# Patient Record
Sex: FEMALE | Race: WHITE | NOT HISPANIC OR LATINO | Employment: OTHER | URBAN - METROPOLITAN AREA
[De-identification: names, ages, dates, MRNs, and addresses within clinical notes are randomized per-mention and may not be internally consistent; named-entity substitution may affect disease eponyms.]

---

## 2017-01-04 ENCOUNTER — OFFICE VISIT (OUTPATIENT)
Dept: INTERNAL MEDICINE | Facility: CLINIC | Age: 82
End: 2017-01-04
Payer: MEDICARE

## 2017-01-04 ENCOUNTER — PATIENT OUTREACH (OUTPATIENT)
Dept: ADMINISTRATIVE | Facility: CLINIC | Age: 82
End: 2017-01-04
Payer: MEDICARE

## 2017-01-04 VITALS
HEIGHT: 65 IN | HEART RATE: 77 BPM | BODY MASS INDEX: 32.32 KG/M2 | DIASTOLIC BLOOD PRESSURE: 82 MMHG | SYSTOLIC BLOOD PRESSURE: 136 MMHG | OXYGEN SATURATION: 93 % | RESPIRATION RATE: 16 BRPM | WEIGHT: 194 LBS

## 2017-01-04 DIAGNOSIS — I10 ESSENTIAL HYPERTENSION: ICD-10-CM

## 2017-01-04 DIAGNOSIS — E16.2 HYPOGLYCEMIA: Primary | ICD-10-CM

## 2017-01-04 PROCEDURE — 99999 PR PBB SHADOW E&M-EST. PATIENT-LVL III: CPT | Mod: PBBFAC,,, | Performed by: INTERNAL MEDICINE

## 2017-01-04 PROCEDURE — 1160F RVW MEDS BY RX/DR IN RCRD: CPT | Mod: S$GLB,,, | Performed by: INTERNAL MEDICINE

## 2017-01-04 PROCEDURE — 1159F MED LIST DOCD IN RCRD: CPT | Mod: S$GLB,,, | Performed by: INTERNAL MEDICINE

## 2017-01-04 PROCEDURE — 1157F ADVNC CARE PLAN IN RCRD: CPT | Mod: S$GLB,,, | Performed by: INTERNAL MEDICINE

## 2017-01-04 PROCEDURE — 99214 OFFICE O/P EST MOD 30 MIN: CPT | Mod: S$GLB,,, | Performed by: INTERNAL MEDICINE

## 2017-01-04 NOTE — PATIENT INSTRUCTIONS
Diabetes (General Information)  Diabetes is a long-term health problem. It means your body does not make enough insulin. Or it may mean that your body cannot use the insulin it makes. Insulin is a hormone in your body. It lets blood sugar (glucose) reach the cells in your body. All of your cells need glucose for fuel.  When you have diabetes, the glucose in your blood builds up because it cannot get into the cells. This buildup is called high blood sugar (hyperglycemia).  Your blood sugar level depends on several things. It depends on what kind of food you eat and how much of it you eat. It also depends on how much exercise you get, and how much insulin you have in your body. Eating too much of the wrong kinds of food or not taking diabetes medicine on time can cause high blood sugar. Infections can cause high blood sugar even if you are taking medicines correctly.  These things can also cause low blood sugar:  · Missing meals  · Not eating enough food  · Taking too much diabetes medicine  Diabetes can cause serious problems over time if you do not get treated. These problems include heart disease, stroke, kidney failure, and blindness. They also include nerve pain or loss of feeling in your legs and feet, and gangrene of the feet. By keeping your blood sugar under control you can prevent or delay these problems.  Normal blood sugar levels are 80 to 100 before a meal and less than 180 in the 1 to 2 hours after a meal.  Home care  Follow these guidelines when caring for yourself at home:  · Follow the diet your healthcare provider gives you. Take insulin or other diabetes medicine exactly as told to.  · Watch your blood sugar as you are told to. Keep a log of your results. This will help your provider change your medicines to keep your blood sugar under control.  · Try to reach your ideal weight. You may be able to cut back on or not have to take diabetes medicine if you eat the right foods and get exercise.  · Do  not smoke. Smoking worsens the effects of diabetes on your circulation. You are much more likely to have a heart attack if you have diabetes and you smoke.  · Take good care of your feet. If you have lost feeling in your feet, you may not see an injury or infection. Check your feet and between your toes at least once a week.  · Wear a medical alert bracelet or necklace, or carry a card in your wallet that says you have diabetes. This will help healthcare providers give you the right care if you get very ill and cannot tell them that you have diabetes.  Sick day plan  If you get a cold, the flu, or a bacterial or viral infection, take these steps:  · Look at your diabetes sick plan and call your healthcare provider as you were told to. You may need to call your provider right away if:  ¨ Your blood sugar is above 240 while taking your diabetes medicine  ¨ Your urine ketone levels are above normal or high  ¨ You have been vomiting more than 6 hours  ¨ You have trouble breathing or your breath ha s a fruity smell  ¨ You have a high fever  ¨ You have a fever for several days and you are not getting better  ¨ You get light-headed and are sleepier than usual  · Keep taking your diabetes pills (oral medicine) even if you have been vomiting and are feeling sick. Call your provider right away because you may need insulin to lower your blood sugar until you recover from your illness.  · Keep taking your insulin even if you have been vomiting and are feeling sick. Call your provider right away to ask if you need to change your insulin dose. This will depend on your blood sugar results.  · Check your blood sugar every 2 to 4 hours, or at least 4 times a day.  · Check your ketones often. If you are vomiting and having diarrhea, watch them more often.  · Do not skip meals. Try to eat small meals on a regular schedule. Do this even if you do not feel like eating.  · Drink water or other liquids that do not have caffeine or  calories. This will keep you from getting dehydrated. If you are nauseated or vomiting, takes small sips every 5 minutes. To prevent dehydration try to drink a cup (8 ounces) of fluids every hour while you are awake.  General care  Always bring a source of fast-acting sugar with you in case you have symptoms of low blood sugar (below 70). At the first sign of low blood sugar, eat or drink 15 to 20 grams of fast-acting sugar to raise your blood sugar. Examples are:  · 3 to 4 glucose tablets. You can buy these at most [facility].  · 4 ounces (1/2 cup) of regular (not diet) soft drinks  · 4 ounces (1/2 cup) of any fruit juice  · 8 ounces (1 cup) of milk  · 5 to 6 pieces of hard candy  · 1 tablespoon of honey  Check your blood sugar 15 minutes after treating yourself. If it is still below 70, take 15 to 20 more grams of fast-acting sugar. Test again in 15 minutes. If it returns to normal (70 or above), eat a snack or meal to keep your blood sugar in a safe range. If it stays low, call your doctor or go to an emergency room.  Follow-up care  Follow-up with your healthcare provider, or as advised. For more information about diabetes, visit the American Diabetes Association website at www.diabetes.org or call 259-356-7962.  When to seek medical advice  Call your healthcare provider right away if you have any of these symptoms of high blood sugar:  · Frequent urination  · Dizziness  · Drowsiness  · Thirst  · Headache  · Nausea or vomiting  · Abdominal pain  · Eyesight changes  · Fast breathing  · Confusion or loss of consciousness  Also call your provider right away if you have any of these signs of low blood sugar:  · Fatigue  · Headache  · Shakes  · Excess sweating  · Hunger  · Feeling anxious or restless  · Eyesight changes  · Drowsiness  · Weakness  · Confusion or loss of consciousness  Call 911  Call for emergency help right away if any of these occur:  · Chest pain or shortness of breath  · Dizziness or  fainting  · Weakness of an arm or leg or one side of the face  · Trouble speaking or seeing   © 6145-0438 [employer]. [address] [place] [address], [place] [address]. All rights reserved. This information is not intended as a substitute for professional medical care. Always follow your healthcare professional's instructions.

## 2017-01-04 NOTE — PROGRESS NOTES
Subjective:       Patient ID: Erin Sahu is a 87 y.o. female.    Chief Complaint: Hypoglycemia and Fall    HPI Comments: 86 y/o W F with several bouts of weakness and some LOC this AM, when she fell and hit her L facial area.She went o our [facility] and was released w a FBS of approx 100; this afternoon she had another bout of weakness and was brought ot [facility] for further eval and in transient she was noted have a FBS in the 35 range.     * No surgery found *       Indwelling Lines/Drains at time of discharge:     Lines/Drains/Airways          No matching active lines, drains, or airways       Hospital Course:   86 y/o WF with another hypoglycemic episode last night, dropping to 3. Pt will need continued obs, IV fluids and serial FBSs yesterday and last night have all been done q 3 hrs with FBSs approx 100 or more. She is doing clinicall weel and is cleared for dischage home to [facility] this week on no insulin nor Diabetic meds      Consults:      Significant Diagnostic Studies: Labs:   BMP:   Recent Labs    Lab 12/30/16  1559 12/31/16  0340  GLU 64* 86   142  K 3.5 3.8   107  CO2 27 28  BUN 24* 20  CREATININE 0.9 0.8  CALCIUM 9.2 9.1  MG 1.8 --   , CMP   Recent Labs    Lab 12/30/16  1559 12/31/16  0340   142  K 3.5 3.8   107  CO2 27 28  GLU 64* 86  BUN 24* 20  CREATININE 0.9 0.8  CALCIUM 9.2 9.1  PROT 6.0 5.3*  ALBUMIN 3.4* 3.0*  BILITOT 0.5 0.6  ALKPHOS 84 75  AST 31 25  ALT 21 18  ANIONGAP 10 7*  ESTGFRAFRICA >60 >60  EGFRNONAA 58* >60  , CBC   Recent Labs    Lab 12/30/16  1559 12/31/16  0340  WBC 5.40 3.54*  HGB 13.4 12.4  HCT 39.1 37.0   146*  , Lipid Panel     Lab Results  Component Value Date    CHOL 151 01/14/2016    HDL 69 01/14/2016    LDLCALC 61.0 (L) 01/14/2016    TRIG 105 01/14/2016    CHOLHDL 45.7 01/14/2016   and Troponin   Recent Labs    Lab 12/31/16  1609  TROPONINI 0.011     Radiology: X-Ray: CXR: X-Ray Chest PA and Lateral (CXR): No results found for this visit on  12/30/16.  MRI:   CT scan: CT ABDOMEN PELVIS WITH CONTRAST: No results found for this visit on 12/30/16.       Pending Diagnostic Studies:     None          Final Active Diagnoses:    Diagnosis Date Noted POA   PRINCIPAL PROBLEM: Hypoglycemia (E16.2) 12/30/2016 Yes   Essential hypertension (I10) 07/23/2015 Yes   Diabetes mellitus with neuropathy (E11.40) 12/31/2012 Yes   Type 2 diabetes mellitus, controlled (E11.9) 09/10/2012 Yes   Hyperlipidemia (E78.5) 09/10/2012 Yes     Problems Resolved During this Admission:    Diagnosis Date Noted Date Resolved POA     * Hypoglycemia  Holding her insulin and no hypoglycemic has been noted     Type 2 diabetes mellitus, controlled  IV fluids will decreased to 25 cc/hr and will hold her insulin        Hyperlipidemia  No change        Obesity           Diabetes mellitus with neuropathy     Treat with her neurotin     Anxiety disorder           Essential hypertension  Treat her HTN with a lower dose of Lisinopril to 20 mg meds; B/P is running a little low this am           Discharged Condition: good     Disposition:      Follow Up:    Follow-up Information     Follow up with Junior Godinez MD.    Specialty: Internal Medicine    Why: Outpatient Services    Contact information:    [address]  [facility] [address]  [phone]         Follow up with Junior Godinez MD In 3 days.    Specialty: Internal Medicine    Contact information:    [address]  [facility] [address]  [phone]        Patient Instructions:   No discharge procedures on file.  Medications:  Reconciled Home Medications:     Current Discharge Medication List     CONTINUE these medications which have CHANGED    Details  amlodipine (NORVASC) 5 MG tablet Take 0.5 tablets (2.5 mg total) by mouth once daily.  Qty: 90 tablet, Refills: 1    Associated Diagnoses: Essential hypertension     lisinopril (PRINIVIL,ZESTRIL) 40 MG tablet Take 0.5 tablets (20 mg total) by mouth once daily.  Qty: 90 tablet, Refills:  1        CONTINUE these medications which have NOT CHANGED    Details  aspirin (ECOTRIN) 81 MG EC tablet Take 1 tablet (81 mg total) by mouth once daily.  Qty: 1 Bottle, Refills: 1     carvedilol (COREG) 25 MG tablet TAKE 1 TABLET TWICE DAILY  Qty: 180 tablet, Refills: 6     !! diabetic supplies, miscellan. Misc [facility] TRUE METRIX AIR METER. USE AS DIRECTED TO TEST BLOOD SUGAR TWICE DAILY.  Qty: 1 each, Refills: 0    Associated Diagnoses: Diabetes mellitus without complication; Essential hypertension     !! diabetic supplies, miscellan. Misc [facility] TRUE METRIX TEST STRIPS. USE AS DIRECTED TO TEST BLOOD SUGAR TWICE DAILY  Qty: 100 each, Refills: 12    Associated Diagnoses: Diabetes mellitus without complication; Essential hypertension     !! diabetic supplies, miscellan. Misc TRUEPLUS SUPER THIN 28G LANCET. USE AS DIRECTED TO TEST BLOOD SUGAR TWICE DAILY.  Qty: 100 each, Refills: 12    Associated Diagnoses: Diabetes mellitus without complication; Essential hypertension     furosemide (LASIX) 20 MG tablet TAKE 1 TABLET ONE TIME DAILY  Qty: 90 tablet, Refills: 1     gabapentin (NEURONTIN) 300 MG capsule TAKE 1 CAPSULE THREE TIMES DAILY  Qty: 270 capsule, Refills: 1     isosorbide mononitrate (IMDUR) 60 MG 24 hr tablet TAKE 1 TABLET ONE TIME DAILY  Qty: 90 tablet, Refills: 1     lorazepam (ATIVAN) 0.5 MG tablet TAKE 1 TABLET EVERY 8 HOURS AS NEEDED  Qty: 90 tablet, Refills: 1     omega-3 fatty acids-vitamin E (FISH OIL) 1,000 mg Cap Take 1 capsule by mouth once daily.  Qty: 90 each, Refills: 1     pravastatin (PRAVACHOL) 20 MG tablet TAKE 1 TABLET EVERY NIGHT  Qty: 90 tablet, Refills: 1     vitamin D 185 MG Tab Take 185 mg by mouth once daily.      nitroGLYCERIN (NITROSTAT) 0.4 MG SL tablet Place 0.4 mg under the tongue every 5 (five) minutes as needed.      !! - Potential duplicate medications found. Please discuss with provider.     STOP taking these medications       alcohol swabs PadM Comments:   Reason for  "Stopping:          insulin NPH-insulin regular, 70/30, (NOVOLIN 70/30) 100 unit/mL (70-30) injection Comments:   Reason for Stopping:          insulin regular 100 unit/mL Inj injection Comments:   Reason for Stopping:          insulin regular 100 unit/mL Inj injection Comments:   Reason for Stopping:          insulin syringe-needle U-100 (INSULIN SYRINGE) 1 mL 30 x 5/16" Syrg Comments:   Reason for Stopping:          cetirizine (ZYRTEC) 10 MG tablet Comments:   Reason for Stopping:          famotidine (PEPCID) 20 MG tablet Comments:   Reason for Stopping:          magnesium oxide (MAG-OX) 400 mg tablet Comments:   Reason for Stopping:          meloxicam (MOBIC) 15 MG tablet Comments:   Reason for Stopping:          nortriptyline (PAMELOR) 10 MG capsule Comments:   Reason for Stopping:              She stopped her NPH insulin since discharge   Doing well.  Her BOP pills are halved  She takes her lasix prn.          Fall   Associated symptoms include numbness. Pertinent negatives include no fever, hematuria, nausea or vomiting.     Review of Systems   Constitutional: Negative for chills and fever.   HENT: Negative for congestion, hearing loss, sinus pressure and sore throat.    Eyes: Negative for photophobia.   Respiratory: Negative for cough, choking, chest tightness, shortness of breath and wheezing.    Cardiovascular: Negative for chest pain and palpitations.   Gastrointestinal: Negative for blood in stool, nausea and vomiting.   Endocrine: Negative for polydipsia and polyphagia.   Genitourinary: Negative for dysuria and hematuria.   Musculoskeletal: Positive for gait problem and myalgias. Negative for arthralgias.        Foot pain left   Skin: Negative for pallor.   Neurological: Positive for numbness. Negative for dizziness.        Numbness and tingling in feet; on gabapentin ;wants to see neurology .   Hematological: Does not bruise/bleed easily.   Psychiatric/Behavioral: Negative for confusion and suicidal " ideas. The patient is nervous/anxious.        Objective:      Physical Exam   Constitutional: She is oriented to person, place, and time. She appears well-developed and well-nourished.   HENT:   Head: Normocephalic and atraumatic.       Right Ear: External ear normal.   Left Ear: External ear normal.   Mouth/Throat: Oropharynx is clear and moist.   Left facial bruise     Eyes: Conjunctivae and EOM are normal. Pupils are equal, round, and reactive to light.   Neck: Normal range of motion. Neck supple. No JVD present. No tracheal deviation present. No thyromegaly present.   Cardiovascular: Normal rate, regular rhythm, normal heart sounds and intact distal pulses.    Pulmonary/Chest: Effort normal and breath sounds normal. No respiratory distress. She has no wheezes. She has no rales. She exhibits no tenderness.   Abdominal: Soft. Bowel sounds are normal. She exhibits no distension and no mass. There is no tenderness. There is no rebound and no guarding.   Musculoskeletal: Normal range of motion. She exhibits edema.   Lymphadenopathy:     She has no cervical adenopathy.   Neurological: She is alert and oriented to person, place, and time. She has normal reflexes. No cranial nerve deficit. She exhibits normal muscle tone. Coordination normal.   Skin: Skin is warm and dry.   Psychiatric: She has a normal mood and affect.   Nursing note and vitals reviewed.      Assessment:       1. Hypoglycemia    2. Essential hypertension        Plan:   Erin was seen today for hypoglycemia and fall.    Diagnoses and all orders for this visit:    Hypoglycemia  Stay off of insulin  Check sugars daily ;  Make a log  RTC in 2 week    Essential hypertension    Take 1/2 th dose of lisinopril and amlodipine.

## 2017-01-16 ENCOUNTER — OFFICE VISIT (OUTPATIENT)
Dept: INTERNAL MEDICINE | Facility: CLINIC | Age: 82
End: 2017-01-16
Payer: MEDICARE

## 2017-01-16 VITALS
HEART RATE: 75 BPM | RESPIRATION RATE: 14 BRPM | WEIGHT: 192.69 LBS | OXYGEN SATURATION: 94 % | BODY MASS INDEX: 32.1 KG/M2 | DIASTOLIC BLOOD PRESSURE: 80 MMHG | HEIGHT: 65 IN | SYSTOLIC BLOOD PRESSURE: 116 MMHG

## 2017-01-16 DIAGNOSIS — E11.9 CONTROLLED TYPE 2 DIABETES MELLITUS WITHOUT COMPLICATION, WITHOUT LONG-TERM CURRENT USE OF INSULIN: ICD-10-CM

## 2017-01-16 DIAGNOSIS — E16.2 HYPOGLYCEMIA: Primary | ICD-10-CM

## 2017-01-16 DIAGNOSIS — I10 ESSENTIAL HYPERTENSION: ICD-10-CM

## 2017-01-16 PROCEDURE — 99999 PR PBB SHADOW E&M-EST. PATIENT-LVL III: CPT | Mod: PBBFAC,,, | Performed by: INTERNAL MEDICINE

## 2017-01-16 PROCEDURE — 99213 OFFICE O/P EST LOW 20 MIN: CPT | Mod: S$GLB,,, | Performed by: INTERNAL MEDICINE

## 2017-01-16 PROCEDURE — 1160F RVW MEDS BY RX/DR IN RCRD: CPT | Mod: S$GLB,,, | Performed by: INTERNAL MEDICINE

## 2017-01-16 PROCEDURE — 1157F ADVNC CARE PLAN IN RCRD: CPT | Mod: S$GLB,,, | Performed by: INTERNAL MEDICINE

## 2017-01-16 PROCEDURE — 1159F MED LIST DOCD IN RCRD: CPT | Mod: S$GLB,,, | Performed by: INTERNAL MEDICINE

## 2017-01-16 RX ORDER — FUROSEMIDE 20 MG/1
TABLET ORAL
Qty: 90 TABLET | Refills: 1 | Status: SHIPPED | OUTPATIENT
Start: 2017-01-16 | End: 2018-07-02 | Stop reason: SDUPTHER

## 2017-01-16 RX ORDER — LORAZEPAM 0.5 MG/1
TABLET ORAL
Qty: 90 TABLET | Refills: 1 | Status: SHIPPED | OUTPATIENT
Start: 2017-01-16 | End: 2017-11-27 | Stop reason: SDUPTHER

## 2017-01-16 RX ORDER — LISINOPRIL 40 MG/1
20 TABLET ORAL DAILY
Qty: 90 TABLET | Refills: 1 | Status: ON HOLD | OUTPATIENT
Start: 2017-01-16 | End: 2017-03-08 | Stop reason: HOSPADM

## 2017-01-16 RX ORDER — GABAPENTIN 300 MG/1
300 CAPSULE ORAL 3 TIMES DAILY
Qty: 270 CAPSULE | Refills: 1 | Status: SHIPPED | OUTPATIENT
Start: 2017-01-16 | End: 2018-07-02 | Stop reason: SDUPTHER

## 2017-01-16 RX ORDER — METFORMIN HYDROCHLORIDE 500 MG/1
500 TABLET ORAL NIGHTLY
Qty: 90 TABLET | Refills: 3 | Status: SHIPPED | OUTPATIENT
Start: 2017-01-16 | End: 2017-12-19 | Stop reason: SDUPTHER

## 2017-01-16 RX ORDER — ISOSORBIDE MONONITRATE 60 MG/1
TABLET, EXTENDED RELEASE ORAL
Qty: 90 TABLET | Refills: 1 | Status: SHIPPED | OUTPATIENT
Start: 2017-01-16 | End: 2017-03-16

## 2017-01-16 RX ORDER — PRAVASTATIN SODIUM 20 MG/1
20 TABLET ORAL NIGHTLY
Qty: 90 TABLET | Refills: 1 | Status: SHIPPED | OUTPATIENT
Start: 2017-01-16 | End: 2017-07-19 | Stop reason: SDUPTHER

## 2017-01-16 RX ORDER — CARVEDILOL 25 MG/1
25 TABLET ORAL 2 TIMES DAILY
Qty: 180 TABLET | Refills: 6 | Status: SHIPPED | OUTPATIENT
Start: 2017-01-16 | End: 2018-07-02 | Stop reason: SDUPTHER

## 2017-01-16 NOTE — PROGRESS NOTES
Subjective:       Patient ID: Erin Sahu is a 87 y.o. female.    Chief Complaint: Hyperlipidemia; Hypertension; Diabetes; Arthritis; and Anxiety    HPI Comments: 88 y/o W F with hypoglycemia  And DM/HTN      She stopped her insulin  Her sugars are still decent   BP on lower side      Hyperlipidemia   Associated symptoms include myalgias. Pertinent negatives include no chest pain or shortness of breath.   Hypertension   Associated symptoms include anxiety. Pertinent negatives include no chest pain, palpitations or shortness of breath.   Diabetes   Hypoglycemia symptoms include nervousness/anxiousness. Pertinent negatives for hypoglycemia include no confusion, dizziness or pallor. Pertinent negatives for diabetes include no chest pain, no polydipsia and no polyphagia.   Arthritis   Pertinent negatives include no dysuria or fever.   Anxiety   Symptoms include nervous/anxious behavior. Patient reports no chest pain, confusion, dizziness, nausea, palpitations, shortness of breath or suicidal ideas.       Fall   Associated symptoms include numbness. Pertinent negatives include no fever, hematuria, nausea or vomiting.     Review of Systems   Constitutional: Negative for chills and fever.   HENT: Negative for congestion, hearing loss, sinus pressure and sore throat.    Eyes: Negative for photophobia.   Respiratory: Negative for cough, choking, chest tightness, shortness of breath and wheezing.    Cardiovascular: Negative for chest pain and palpitations.   Gastrointestinal: Negative for blood in stool, nausea and vomiting.   Endocrine: Negative for polydipsia and polyphagia.   Genitourinary: Negative for dysuria and hematuria.   Musculoskeletal: Positive for arthritis, gait problem and myalgias. Negative for arthralgias.        Foot pain left   Skin: Negative for pallor.   Neurological: Positive for numbness. Negative for dizziness.        Numbness and tingling in feet; on gabapentin ;wants to see neurology .    Hematological: Does not bruise/bleed easily.   Psychiatric/Behavioral: Negative for confusion and suicidal ideas. The patient is nervous/anxious.        Objective:      Physical Exam   Constitutional: She is oriented to person, place, and time. She appears well-developed and well-nourished.   HENT:   Head: Normocephalic and atraumatic.       Right Ear: External ear normal.   Left Ear: External ear normal.   Mouth/Throat: Oropharynx is clear and moist.   Left facial bruise     Eyes: Conjunctivae and EOM are normal. Pupils are equal, round, and reactive to light.   Neck: Normal range of motion. Neck supple. No JVD present. No tracheal deviation present. No thyromegaly present.   Cardiovascular: Normal rate, regular rhythm, normal heart sounds and intact distal pulses.    Pulmonary/Chest: Effort normal and breath sounds normal. No respiratory distress. She has no wheezes. She has no rales. She exhibits no tenderness.   Abdominal: Soft. Bowel sounds are normal. She exhibits no distension and no mass. There is no tenderness. There is no rebound and no guarding.   Musculoskeletal: Normal range of motion. She exhibits edema.   Lymphadenopathy:     She has no cervical adenopathy.   Neurological: She is alert and oriented to person, place, and time. She has normal reflexes. No cranial nerve deficit. She exhibits normal muscle tone. Coordination normal.   Skin: Skin is warm and dry.   Psychiatric: She has a normal mood and affect.   Nursing note and vitals reviewed.      Assessment:       1. Hypoglycemia    2. Essential hypertension    3. Controlled type 2 diabetes mellitus without complication, without long-term current use of insulin        Plan:   Hypoglycemia  Stay off of insulin  Start metformin 500mg every night     Essential hypertension  DC amlodipine  Stay on coreg and 20 mg of lisinopril     Controlled type 2 diabetes mellitus without complication, without long-term current use of insulin  -     metformin  (GLUCOPHAGE) 500 MG tablet; Take 1 tablet (500 mg total) by mouth every evening.  Dispense: 90 tablet; Refill: 3  -     Comprehensive metabolic panel; Future; Expected date: 4/16/17  G  Other orders  -     gabapentin (NEURONTIN) 300 MG capsule; Take 1 capsule (300 mg total) by mouth 3 (three) times daily.  -     isosorbide mononitrate (IMDUR) 60 MG 24 hr tablet; TAKE 1 TABLET ONE TIME DAILY  -     lorazepam (ATIVAN) 0.5 MG tablet; TAKE 1 TABLET EVERY 8 HOURS AS NEEDED  -     pravastatin (PRAVACHOL) 20 MG tablet; Take 1 tablet (20 mg total) by mouth nightly.

## 2017-01-16 NOTE — MR AVS SNAPSHOT
[facility] Internal Medicine  [address] [address]  Phone: [phone]  Fax: [phone]                  Erin Sahu   2017 3:00 PM   Office Visit    Description:  Female : 1929   Provider:  Junior Godinez MD   Department:  [facility] Internal Medicine           Reason for Visit     Hyperlipidemia     Hypertension     Diabetes     Arthritis     Anxiety           Diagnoses this Visit        Comments    Hypoglycemia    -  Primary     Essential hypertension         Controlled type 2 diabetes mellitus without complication, without long-term current use of insulin                To Do List           Future Appointments        Provider Department Dept Phone    4/10/2017 10:00 AM Junior Godinez MD [facility] [phone]      Goals (5 Years of Data)     None      Follow-Up and Disposition     Return in about 3 months (around 2017).    Follow-up and Disposition History       These Medications        Disp Refills Start End    metformin (GLUCOPHAGE) 500 MG tablet 90 tablet 3 2017    Take 1 tablet (500 mg total) by mouth every evening. - Oral    Pharmacy: [facility] - [address] Ph #: [phone]       carvedilol (COREG) 25 MG tablet 180 tablet 6 2017     Take 1 tablet (25 mg total) by mouth 2 (two) times daily. - Oral    Pharmacy: [facility] - [address] Ph #: [phone]       furosemide (LASIX) 20 MG tablet 90 tablet 1 2017     TAKE 1 TABLET ONE TIME every other DAy    Pharmacy: [facility] - [address] Ph #: [phone]       gabapentin (NEURONTIN) 300 MG capsule 270 capsule 1 2017     Take 1 capsule (300 mg total) by mouth 3 (three) times daily. - Oral    Pharmacy: [facility] - [address] Ph #: [phone]       isosorbide mononitrate  (IMDUR) 60 MG 24 hr tablet 90 tablet 1 1/16/2017     TAKE 1 TABLET ONE TIME DAILY    Pharmacy: [facility] - [facility] [address] Ph #: [phone]       lisinopril (PRINIVIL,ZESTRIL) 40 MG tablet 90 tablet 1 1/16/2017     Take 0.5 tablets (20 mg total) by mouth once daily. - Oral    Pharmacy: [facility] - [facility] [address] Ph #: [phone]       lorazepam (ATIVAN) 0.5 MG tablet 90 tablet 1 1/16/2017     TAKE 1 TABLET EVERY 8 HOURS AS NEEDED    Pharmacy: [facility] - [facility] [address] Ph #: [phone]       pravastatin (PRAVACHOL) 20 MG tablet 90 tablet 1 1/16/2017     Take 1 tablet (20 mg total) by mouth nightly. - Oral    Pharmacy: [facility] - [facility] [address] Ph #: [phone]         [facility] On Call     [facility] On Call Nurse Care Line - 24/7 Assistance  Registered nurses in the [facility] On Call Center provide clinical advisement, health education, appointment booking, and other advisory services.  Call for this free service at [phone].             Medications           Message regarding Medications     Verify the changes and/or additions to your medication regime listed below are the same as discussed with your clinician today.  If any of these changes or additions are incorrect, please notify your healthcare provider.        START taking these NEW medications        Refills    metformin (GLUCOPHAGE) 500 MG tablet 3    Sig: Take 1 tablet (500 mg total) by mouth every evening.    Class: Normal    Route: Oral      CHANGE how you are taking these medications     Start Taking Instead of    carvedilol (COREG) 25 MG tablet carvedilol (COREG) 25 MG tablet    Dosage:  Take 1 tablet (25 mg total) by mouth 2 (two) times daily. Dosage:  TAKE 1 TABLET TWICE DAILY    Reason for Change:  Reorder     furosemide (LASIX) 20 MG tablet furosemide (LASIX) 20 MG tablet     Dosage:  TAKE 1 TABLET ONE TIME every other DAy Dosage:  TAKE 1 TABLET ONE TIME DAILY    Reason for Change:  Reorder     gabapentin (NEURONTIN) 300 MG capsule gabapentin (NEURONTIN) 300 MG capsule    Dosage:  Take 1 capsule (300 mg total) by mouth 3 (three) times daily. Dosage:  TAKE 1 CAPSULE THREE TIMES DAILY    Reason for Change:  Reorder     pravastatin (PRAVACHOL) 20 MG tablet pravastatin (PRAVACHOL) 20 MG tablet    Dosage:  Take 1 tablet (20 mg total) by mouth nightly. Dosage:  TAKE 1 TABLET EVERY NIGHT    Reason for Change:  Reorder       STOP taking these medications     amlodipine (NORVASC) 5 MG tablet Take 0.5 tablets (2.5 mg total) by mouth once daily.           Verify that the below list of medications is an accurate representation of the medications you are currently taking.  If none reported, the list may be blank. If incorrect, please contact your healthcare provider. Carry this list with you in case of emergency.           Current Medications     aspirin (ECOTRIN) 81 MG EC tablet Take 1 tablet (81 mg total) by mouth once daily.    carvedilol (COREG) 25 MG tablet Take 1 tablet (25 mg total) by mouth 2 (two) times daily.    diabetic supplies, miscellan. Misc [email or web address] TRUE METRIX AIR METER. USE AS DIRECTED TO TEST BLOOD SUGAR TWICE DAILY.    diabetic supplies, miscellan. Misc HUMANA TRUE METRIX TEST STRIPS. USE AS DIRECTED TO TEST BLOOD SUGAR TWICE DAILY    diabetic supplies, miscellan. Misc TRUEPLUS SUPER THIN 28G LANCET. USE AS DIRECTED TO TEST BLOOD SUGAR TWICE DAILY.    furosemide (LASIX) 20 MG tablet TAKE 1 TABLET ONE TIME every other DAy    gabapentin (NEURONTIN) 300 MG capsule Take 1 capsule (300 mg total) by mouth 3 (three) times daily.    isosorbide mononitrate (IMDUR) 60 MG 24 hr tablet TAKE 1 TABLET ONE TIME DAILY    lisinopril (PRINIVIL,ZESTRIL) 40 MG tablet Take 0.5 tablets (20 mg total) by mouth once daily.    lorazepam (ATIVAN) 0.5 MG tablet TAKE 1 TABLET EVERY 8 HOURS AS NEEDED     "nitroGLYCERIN (NITROSTAT) 0.4 MG SL tablet Place 0.4 mg under the tongue every 5 (five) minutes as needed.      omega-3 fatty acids-vitamin E (FISH OIL) 1,000 mg Cap Take 1 capsule by mouth once daily.    pravastatin (PRAVACHOL) 20 MG tablet Take 1 tablet (20 mg total) by mouth nightly.    vitamin D 185 MG Tab Take 185 mg by mouth once daily.      metformin (GLUCOPHAGE) 500 MG tablet Take 1 tablet (500 mg total) by mouth every evening.           Clinical Reference Information           Vital Signs - Last Recorded  Most recent update: 1/16/2017  3:06 PM by Margo Purvis MA    BP Pulse Resp Ht Wt SpO2    116/80 75 14 5' 5" (1.651 m) 87.4 kg (192 lb 10.9 oz) (!) 94%    BMI                32.06 kg/m2          Blood Pressure          Most Recent Value    BP  116/80      Allergies as of 1/16/2017     Iodinated Contrast Media - Iv Dye      Immunizations Administered on Date of Encounter - 1/16/2017     None      Orders Placed During Today's Visit     Future Labs/Procedures Expected by Expires    Comprehensive metabolic panel  4/16/2017 1/16/2018      "

## 2017-01-26 ENCOUNTER — TELEPHONE (OUTPATIENT)
Dept: INTERNAL MEDICINE | Facility: CLINIC | Age: 82
End: 2017-01-26

## 2017-01-26 NOTE — TELEPHONE ENCOUNTER
Pts granddaughter called in reporting of pts BP low. Pt called her this morning c/o feeling bad. Her BP was 90s/50s. Granddaugther reported that it doesn't happen constantly but every other day or every few days it is dropping. Please advise.

## 2017-01-26 NOTE — TELEPHONE ENCOUNTER
----- Message from Lilly Madera sent at 2017  8:28 AM CST -----  Contact: Kristyn / granddaughter  Erin Sahu  MRN: [id number]  : 1929  PCP: Junior Godinez  Home Phone      [phone]  Work Phone      Not on file.  Mobile          Not on file.      MESSAGE:   Has a question about her blood pressure. Please call     Phone: [phone]

## 2017-02-03 ENCOUNTER — TELEPHONE (OUTPATIENT)
Dept: INTERNAL MEDICINE | Facility: CLINIC | Age: 82
End: 2017-02-03

## 2017-02-03 NOTE — TELEPHONE ENCOUNTER
Grand daughter states that patient continues with hypotension although she has not been taking Lisinopril. She reports B/P - 80/50 and patient c/o feeling dizzy/light-headed after taking am meds. She will have patient contact me to inform me of what medication she is taking in the morning so I can relay this info to Dr. Godinez.

## 2017-02-03 NOTE — TELEPHONE ENCOUNTER
Patient reports that she is taking the following meds every am:  Carvedilol 25 mg  Lasix 20 mg qod  Gabapentin 300 mg  Isosorbide 60 mg    Please advise of any additional recommendations. Thanks.

## 2017-02-03 NOTE — TELEPHONE ENCOUNTER
----- Message from Ellie Wolfe sent at 2/3/2017 11:17 AM CST -----  Contact: SHANE HARVEY DAUGHTER  Erin Sahu  MRN: [id number]  : 1929  PCP: Junior Godinez  Home Phone      [phone]  Work Phone      Not on file.  Mobile          Not on file.      MESSAGE: QUESTION ABOUT LOW BLOOD PRESSURE------[id number]

## 2017-02-06 ENCOUNTER — TELEPHONE (OUTPATIENT)
Dept: INTERNAL MEDICINE | Facility: CLINIC | Age: 82
End: 2017-02-06

## 2017-02-06 NOTE — TELEPHONE ENCOUNTER
----- Message from Lilly Madera sent at 2017  1:27 PM CST -----  Contact: Sadie / mother in law  Erin Sahu  MRN: [id number]  : 1929  PCP: Junior Godinez  Home Phone      [phone]  Work Phone      Not on file.  Mobile          Not on file.      MESSAGE:   Martha called for Kimbelry today regarding Ms. Khan. Kimberly is at a conference right now and texted her mom to call in with what she needs to tell Martha. Can you please call her.    Phone: [phone]

## 2017-02-06 NOTE — TELEPHONE ENCOUNTER
Attempted to contact Kimberly to advise of new orders per Dr. Godinez, but no answer. LMOM. Phoned patient with instructions to hold isosorbide for now, and lasix also if B/P remains low. Instructed to record and report B/P readings.

## 2017-03-07 ENCOUNTER — HOSPITAL ENCOUNTER (OUTPATIENT)
Facility: HOSPITAL | Age: 82
Discharge: HOME HEALTH | End: 2017-03-08
Attending: SURGERY | Admitting: INTERNAL MEDICINE
Payer: MEDICARE

## 2017-03-07 DIAGNOSIS — I95.9 HYPOTENSION, UNSPECIFIED HYPOTENSION TYPE: ICD-10-CM

## 2017-03-07 DIAGNOSIS — R11.2 NAUSEA, VOMITING AND DIARRHEA: ICD-10-CM

## 2017-03-07 DIAGNOSIS — E86.0 DEHYDRATION: Primary | ICD-10-CM

## 2017-03-07 DIAGNOSIS — R19.7 NAUSEA, VOMITING AND DIARRHEA: ICD-10-CM

## 2017-03-07 LAB
ALBUMIN SERPL BCP-MCNC: 3.3 G/DL
ALP SERPL-CCNC: 82 U/L
ALT SERPL W/O P-5'-P-CCNC: 17 U/L
ANION GAP SERPL CALC-SCNC: 9 MMOL/L
AST SERPL-CCNC: 19 U/L
BASOPHILS # BLD AUTO: 0 K/UL
BASOPHILS NFR BLD: 0 %
BILIRUB SERPL-MCNC: 1 MG/DL
BILIRUB UR QL STRIP: NEGATIVE
BNP SERPL-MCNC: 93 PG/ML
BUN SERPL-MCNC: 21 MG/DL
CALCIUM SERPL-MCNC: 8.8 MG/DL
CHLORIDE SERPL-SCNC: 105 MMOL/L
CK MB SERPL-MCNC: 2.9 NG/ML
CK MB SERPL-RTO: 4.6 %
CK SERPL-CCNC: 63 U/L
CK SERPL-CCNC: 63 U/L
CLARITY UR: CLEAR
CO2 SERPL-SCNC: 25 MMOL/L
COLOR UR: YELLOW
CREAT SERPL-MCNC: 0.9 MG/DL
DIFFERENTIAL METHOD: ABNORMAL
EOSINOPHIL # BLD AUTO: 0 K/UL
EOSINOPHIL NFR BLD: 1 %
ERYTHROCYTE [DISTWIDTH] IN BLOOD BY AUTOMATED COUNT: 13.6 %
EST. GFR  (AFRICAN AMERICAN): >60 ML/MIN/1.73 M^2
EST. GFR  (NON AFRICAN AMERICAN): 58 ML/MIN/1.73 M^2
GLUCOSE SERPL-MCNC: 145 MG/DL
GLUCOSE UR QL STRIP: NEGATIVE
HCT VFR BLD AUTO: 39 %
HGB BLD-MCNC: 13.2 G/DL
HGB UR QL STRIP: ABNORMAL
KETONES UR QL STRIP: ABNORMAL
LACTATE SERPL-SCNC: 1.1 MMOL/L
LEUKOCYTE ESTERASE UR QL STRIP: NEGATIVE
LYMPHOCYTES # BLD AUTO: 1 K/UL
LYMPHOCYTES NFR BLD: 31.2 %
MCH RBC QN AUTO: 31.7 PG
MCHC RBC AUTO-ENTMCNC: 33.8 %
MCV RBC AUTO: 94 FL
MONOCYTES # BLD AUTO: 0.5 K/UL
MONOCYTES NFR BLD: 17.2 %
NEUTROPHILS # BLD AUTO: 1.6 K/UL
NEUTROPHILS NFR BLD: 50.6 %
NITRITE UR QL STRIP: NEGATIVE
PH UR STRIP: 5 [PH] (ref 5–8)
PLATELET # BLD AUTO: 143 K/UL
PMV BLD AUTO: 9.5 FL
POCT GLUCOSE: 115 MG/DL (ref 70–110)
POCT GLUCOSE: 80 MG/DL (ref 70–110)
POTASSIUM SERPL-SCNC: 3.8 MMOL/L
PROT SERPL-MCNC: 5.7 G/DL
PROT UR QL STRIP: NEGATIVE
RBC # BLD AUTO: 4.16 M/UL
SODIUM SERPL-SCNC: 139 MMOL/L
SP GR UR STRIP: <=1.005 (ref 1–1.03)
TROPONIN I SERPL DL<=0.01 NG/ML-MCNC: 0.01 NG/ML
TROPONIN I SERPL DL<=0.01 NG/ML-MCNC: 0.02 NG/ML
TROPONIN I SERPL DL<=0.01 NG/ML-MCNC: <0.006 NG/ML
URN SPEC COLLECT METH UR: ABNORMAL
UROBILINOGEN UR STRIP-ACNC: NEGATIVE EU/DL
WBC # BLD AUTO: 3.14 K/UL

## 2017-03-07 PROCEDURE — 99220 PR INITIAL OBSERVATION CARE,LEVL III: CPT | Mod: ,,, | Performed by: INTERNAL MEDICINE

## 2017-03-07 PROCEDURE — 85025 COMPLETE CBC W/AUTO DIFF WBC: CPT

## 2017-03-07 PROCEDURE — 25000003 PHARM REV CODE 250: Performed by: SURGERY

## 2017-03-07 PROCEDURE — 93010 ELECTROCARDIOGRAM REPORT: CPT | Mod: ,,, | Performed by: INTERNAL MEDICINE

## 2017-03-07 PROCEDURE — 84484 ASSAY OF TROPONIN QUANT: CPT | Mod: 91

## 2017-03-07 PROCEDURE — 83605 ASSAY OF LACTIC ACID: CPT

## 2017-03-07 PROCEDURE — 96360 HYDRATION IV INFUSION INIT: CPT

## 2017-03-07 PROCEDURE — 83036 HEMOGLOBIN GLYCOSYLATED A1C: CPT

## 2017-03-07 PROCEDURE — 36415 COLL VENOUS BLD VENIPUNCTURE: CPT

## 2017-03-07 PROCEDURE — 96361 HYDRATE IV INFUSION ADD-ON: CPT

## 2017-03-07 PROCEDURE — 25000003 PHARM REV CODE 250: Performed by: NURSE PRACTITIONER

## 2017-03-07 PROCEDURE — 27000339 *HC DAILY SUPPLY KIT

## 2017-03-07 PROCEDURE — 94761 N-INVAS EAR/PLS OXIMETRY MLT: CPT

## 2017-03-07 PROCEDURE — 87040 BLOOD CULTURE FOR BACTERIA: CPT

## 2017-03-07 PROCEDURE — 27200120 HC KIT IV START (RUSH ONLY)

## 2017-03-07 PROCEDURE — 83880 ASSAY OF NATRIURETIC PEPTIDE: CPT

## 2017-03-07 PROCEDURE — 99285 EMERGENCY DEPT VISIT HI MDM: CPT | Mod: 25

## 2017-03-07 PROCEDURE — G0378 HOSPITAL OBSERVATION PER HR: HCPCS

## 2017-03-07 PROCEDURE — 82553 CREATINE MB FRACTION: CPT

## 2017-03-07 PROCEDURE — 80053 COMPREHEN METABOLIC PANEL: CPT

## 2017-03-07 PROCEDURE — 81003 URINALYSIS AUTO W/O SCOPE: CPT

## 2017-03-07 PROCEDURE — 82962 GLUCOSE BLOOD TEST: CPT | Mod: 91

## 2017-03-07 PROCEDURE — 93005 ELECTROCARDIOGRAM TRACING: CPT

## 2017-03-07 PROCEDURE — 25000003 PHARM REV CODE 250: Performed by: INTERNAL MEDICINE

## 2017-03-07 RX ORDER — ONDANSETRON 2 MG/ML
4 INJECTION INTRAMUSCULAR; INTRAVENOUS EVERY 8 HOURS PRN
Status: DISCONTINUED | OUTPATIENT
Start: 2017-03-07 | End: 2017-03-08 | Stop reason: HOSPADM

## 2017-03-07 RX ORDER — ACETAMINOPHEN 325 MG/1
650 TABLET ORAL EVERY 8 HOURS PRN
Status: DISCONTINUED | OUTPATIENT
Start: 2017-03-07 | End: 2017-03-08 | Stop reason: HOSPADM

## 2017-03-07 RX ORDER — CARVEDILOL 3.12 MG/1
3.12 TABLET ORAL 2 TIMES DAILY
Status: DISCONTINUED | OUTPATIENT
Start: 2017-03-07 | End: 2017-03-08

## 2017-03-07 RX ORDER — GLUCAGON 1 MG
1 KIT INJECTION
Status: DISCONTINUED | OUTPATIENT
Start: 2017-03-07 | End: 2017-03-08 | Stop reason: HOSPADM

## 2017-03-07 RX ORDER — PANTOPRAZOLE SODIUM 40 MG/1
40 TABLET, DELAYED RELEASE ORAL DAILY
Status: DISCONTINUED | OUTPATIENT
Start: 2017-03-07 | End: 2017-03-08 | Stop reason: HOSPADM

## 2017-03-07 RX ORDER — METFORMIN HYDROCHLORIDE 500 MG/1
500 TABLET ORAL NIGHTLY
Status: DISCONTINUED | OUTPATIENT
Start: 2017-03-07 | End: 2017-03-07

## 2017-03-07 RX ORDER — SODIUM CHLORIDE, SODIUM LACTATE, POTASSIUM CHLORIDE, CALCIUM CHLORIDE 600; 310; 30; 20 MG/100ML; MG/100ML; MG/100ML; MG/100ML
INJECTION, SOLUTION INTRAVENOUS CONTINUOUS
Status: DISCONTINUED | OUTPATIENT
Start: 2017-03-07 | End: 2017-03-08

## 2017-03-07 RX ORDER — LISINOPRIL 2.5 MG/1
5 TABLET ORAL DAILY
Status: DISCONTINUED | OUTPATIENT
Start: 2017-03-08 | End: 2017-03-07

## 2017-03-07 RX ORDER — IBUPROFEN 200 MG
16 TABLET ORAL
Status: DISCONTINUED | OUTPATIENT
Start: 2017-03-07 | End: 2017-03-08 | Stop reason: HOSPADM

## 2017-03-07 RX ORDER — LISINOPRIL 20 MG/1
20 TABLET ORAL DAILY
Status: DISCONTINUED | OUTPATIENT
Start: 2017-03-07 | End: 2017-03-08

## 2017-03-07 RX ORDER — INSULIN ASPART 100 [IU]/ML
0-5 INJECTION, SOLUTION INTRAVENOUS; SUBCUTANEOUS
Status: DISCONTINUED | OUTPATIENT
Start: 2017-03-07 | End: 2017-03-08 | Stop reason: HOSPADM

## 2017-03-07 RX ORDER — SODIUM CHLORIDE 9 MG/ML
1000 INJECTION, SOLUTION INTRAVENOUS
Status: COMPLETED | OUTPATIENT
Start: 2017-03-07 | End: 2017-03-07

## 2017-03-07 RX ORDER — IBUPROFEN 200 MG
24 TABLET ORAL
Status: DISCONTINUED | OUTPATIENT
Start: 2017-03-07 | End: 2017-03-08 | Stop reason: HOSPADM

## 2017-03-07 RX ORDER — LISINOPRIL 20 MG/1
20 TABLET ORAL DAILY
Status: DISCONTINUED | OUTPATIENT
Start: 2017-03-08 | End: 2017-03-07

## 2017-03-07 RX ORDER — CLONIDINE HYDROCHLORIDE 0.1 MG/1
0.1 TABLET ORAL EVERY 6 HOURS PRN
Status: DISCONTINUED | OUTPATIENT
Start: 2017-03-07 | End: 2017-03-08 | Stop reason: HOSPADM

## 2017-03-07 RX ORDER — ASPIRIN 81 MG/1
81 TABLET ORAL DAILY
Status: DISCONTINUED | OUTPATIENT
Start: 2017-03-07 | End: 2017-03-08 | Stop reason: HOSPADM

## 2017-03-07 RX ADMIN — ACETAMINOPHEN 650 MG: 325 TABLET, FILM COATED ORAL at 06:03

## 2017-03-07 RX ADMIN — SODIUM CHLORIDE 1000 ML: 0.9 INJECTION, SOLUTION INTRAVENOUS at 10:03

## 2017-03-07 RX ADMIN — CLONIDINE HYDROCHLORIDE 0.1 MG: 0.1 TABLET ORAL at 10:03

## 2017-03-07 RX ADMIN — SODIUM CHLORIDE, SODIUM LACTATE, POTASSIUM CHLORIDE, AND CALCIUM CHLORIDE: .6; .31; .03; .02 INJECTION, SOLUTION INTRAVENOUS at 02:03

## 2017-03-07 RX ADMIN — LISINOPRIL 20 MG: 20 TABLET ORAL at 09:03

## 2017-03-07 RX ADMIN — PANTOPRAZOLE SODIUM 40 MG: 40 TABLET, DELAYED RELEASE ORAL at 02:03

## 2017-03-07 RX ADMIN — CARVEDILOL 3.12 MG: 3.12 TABLET, FILM COATED ORAL at 09:03

## 2017-03-07 NOTE — ED PROVIDER NOTES
[facility]                                        March 7, 2017                   Chief Complaint  87 y.o. female with Hypotension; Emesis; and Nausea    History of Present Illness  Erin Sahu presents to the emergency room with hypotension and nausea vomiting  [facility] with nausea vomiting and diarrhea   Pt on a systolic blood pressure in the 70s in that clinic, referred to the ER for treatment  Pt has had nausea vomiting and diarrhea since Saturday night with low blood pressure  Patient has had issues with hypotension in the past, BP meds often dropper BP greatly  Patient states this hypotension is worse with the diarrhea, SBP of 84 arrival in the ER    The history is provided by the patient    Past Medical History   -- Anxiety    -- Arthritis    -- Coronary artery disease    -- Diabetes mellitus type II    -- Hyperlipidemia    -- Hypertension      Past Surgical History   -- BREAST SURGERY     -- CARDIAC PACEMAKER PLACEMENT     -- CHOLECYSTECTOMY     -- CORONARY ANGIOPLASTY WITH STENT PLACEMENT     -- EYE SURGERY     -- HYSTERECTOMY     -- TOTAL KNEE ARTHROPLASTY        ALLERGIES: IV dye    Review of Systems and Physical Exam     Review of Systems  -- Constitution - fatigue, weakness, no chills, no fever  -- Eyes - no tearing or redness, no visual disturbance  -- Ear, Nose - no tinnitus or earache, no nasal congestion or discharge  -- Mouth,Throat - no sore throat, no toothache, normal voice, normal swallowing  -- Respiratory - denies cough and congestion, no shortness of breath, no CAMPOS  -- Cardiovascular - denies chest pain, no palpitations, denies claudication  -- Gastrointestinal - abdominal cramps with nausea, vomiting, & diarrhea   -- Musculoskeletal - denies back pain, negative for myalgias and arthralgias   -- Neurological - no headache, denies weakness or seizure; no LOC  -- Skin - denies pallor, rash, or changes in skin. no hives or welts  noted    Vital Signs  -- Her oral temperature is 97.3 °F (36.3 °C).   -- Her blood pressure is 124/67 and her pulse is 68.   -- Her respiration is 18 and oxygen saturation is 99%.      Physical Exam  -- Nursing note and vitals reviewed  -- Head: Atraumatic. Normocephalic. No obvious abnormality  -- Eyes: Pupils are equal and reactive to light. Normal conjunctiva and lids  -- Cardiac: Normal rate, regular rhythm and normal heart sounds  -- Pulmonary: Normal respiratory effort, breath sounds clear to auscultation  -- Abdominal: Soft, no tenderness. Normal bowel sounds. Normal liver edge  -- Musculoskeletal: Normal range of motion, no effusions. Joints stable   -- Neurological: No focal deficits. Showed good interaction with staff  -- Vascular: Posterior tibial, dorsalis pedis and radial pulses 2+ bilaterally      Emergency Room Course     Treatment and Evaluation  -- The CT of the abdomen and pelvis was negative for acute pathology   -- The EKG findings today were without concerning findings from baseline   -- The electrolytes drawn in the ER today were within normal limits   -- The CBC drawn in the ER today was within normal limits   -- Lactic Acid drawn in the ER today was normal   -- Blood cultures have also been drawn, results are pending   -- The cardiac enzymes were within normal limits   -- The BNP drawn in the ER today were within normal limits   -- Saline lock started in the ER per protocol  -- IV Fluids given in today in the ER  -- Cardiac monitoring per protocol    Diagnosis  -- Dehydration  -- Nausea, vomiting and diarrhea  -- Hypotension    Disposition and Plan  -- Disposition: observation  -- Condition: stable  -- Telemetry monitoring  -- Morning labs  -- SCD hoses  -- Home medications  -- Nausea medication when necessary  -- Pain medication when necessary  -- Intravenous fluids  -- Routine monitoring  -- Bed rest until otherwise stated  -- Low salt diet  -- Protonix for GERD prophylaxis  -- EKG in the  morning  -- Aspirin daily  -- Cardiology consult  -- Serial cardiac enzymes  -- 1800 diet  -- Home diabetes medications    This note is dictated on Dragon Natural Speaking word recognition program.  There are word recognition mistakes that are occasionally missed on review.           Moy Branham MD  03/07/17 [id number]

## 2017-03-07 NOTE — ED TRIAGE NOTES
Patient presents to the ER after leaving urgent care with granddaughter.  Patient c/o of n/v/d for the past 3 days.  Patient is also hypotensive.  Family says that patient has history of low blood pressure. Patient is awake, alert and oriented.

## 2017-03-07 NOTE — CONSULTS
[facility]  Cardiology  Consult Note    Patient Name: Erin Sahu  MRN: [id number]  Admission Date: 3/7/2017  Hospital Length of Stay: 0 days  Code Status: Full Code   Attending Provider: Junior Godinez MD   Consulting Provider: KEILA Blankenship  Primary Care Physician: Junior Godinez MD  Principal Problem:<principal problem not specified>    Patient information was obtained from patient, past medical records and ER records.     Inpatient consult to [facility]  Consult performed by: TERESITA ESRRA  Consult ordered by: MODESTO MIMS        Subjective:     Chief Complaint:  N/V/D low BP    HPI: 87 year old w/f with HX of mod AS, HHD, DM,  CAD, dyslipidemia, HTN presented to the ED from urgent care after being seen for N/V/D since Saturday and was noted to be hypotensive BP in the 70s at urgent care. Upon arrival to ED SBP of 84. Patient is AAOX3, reports some general malaise related to vomiting and diarrhea. She denies fever, chest pain, SOB, or palpitations. She reports that before her current illness she was having some low BP readings in the 80s at home.     Past Medical History:   Diagnosis Date    Anxiety     Arthritis     Coronary artery disease     Diabetes mellitus type II     Hyperlipidemia     Hypertension        Past Surgical History:   Procedure Laterality Date    BREAST SURGERY      CARDIAC PACEMAKER PLACEMENT  01/23/2009    CHOLECYSTECTOMY      CORONARY ANGIOPLASTY WITH STENT PLACEMENT  03/2001    EYE SURGERY Bilateral 06/2001    cataracts    HYSTERECTOMY      TOTAL KNEE ARTHROPLASTY Right        Review of patient's allergies indicates:   Allergen Reactions    Iodinated contrast media - iv dye Hives       No current facility-administered medications on file prior to encounter.      Current Outpatient Prescriptions on File Prior to Encounter   Medication Sig    aspirin (ECOTRIN) 81 MG EC tablet Take 1 tablet (81 mg total) by mouth once daily.     carvedilol (COREG) 25 MG tablet Take 1 tablet (25 mg total) by mouth 2 (two) times daily.    furosemide (LASIX) 20 MG tablet TAKE 1 TABLET ONE TIME every other DAy    gabapentin (NEURONTIN) 300 MG capsule Take 1 capsule (300 mg total) by mouth 3 (three) times daily.    isosorbide mononitrate (IMDUR) 60 MG 24 hr tablet TAKE 1 TABLET ONE TIME DAILY    lisinopril (PRINIVIL,ZESTRIL) 40 MG tablet Take 0.5 tablets (20 mg total) by mouth once daily.    metformin (GLUCOPHAGE) 500 MG tablet Take 1 tablet (500 mg total) by mouth every evening.    omega-3 fatty acids-vitamin E (FISH OIL) 1,000 mg Cap Take 1 capsule by mouth once daily.    pravastatin (PRAVACHOL) 20 MG tablet Take 1 tablet (20 mg total) by mouth nightly.    vitamin D 185 MG Tab Take 185 mg by mouth once daily.      diabetic supplies, miscellan. Misc HUMANOfferboard TRUE METRIX AIR METER. USE AS DIRECTED TO TEST BLOOD SUGAR TWICE DAILY.    diabetic supplies, miscellan. Misc HUM[employer]Offerboard TRUE METRIX TEST STRIPS. USE AS DIRECTED TO TEST BLOOD SUGAR TWICE DAILY    diabetic supplies, miscellan. Misc TRUEPLUS SUPER THIN 28G LANCET. USE AS DIRECTED TO TEST BLOOD SUGAR TWICE DAILY.    lorazepam (ATIVAN) 0.5 MG tablet TAKE 1 TABLET EVERY 8 HOURS AS NEEDED    nitroGLYCERIN (NITROSTAT) 0.4 MG SL tablet Place 0.4 mg under the tongue every 5 (five) minutes as needed.       Family History     None        Social History Main Topics    Smoking status: Never Smoker    Smokeless tobacco: Never Used    Alcohol use No    Drug use: No    Sexual activity: Not on file     ROS   Constitutional: general malaise  Eyes: Negative    Respiratory: Negative    Cardiovascular: Negative   Gastrointestinal: N/V/D   Genitourinary: Negative   Musculoskeletal: Negative   Skin: Negative .   Neurological: Negative   Objective:     Vital Signs (Most Recent):  Temp: 97.2 °F (36.2 °C) (03/07/17 1416)  Pulse: 66 (03/07/17 1416)  Resp: 20 (03/07/17 1416)  BP: (!) 147/78 (03/07/17 1416)  SpO2: 95 %  (03/07/17 1416) Vital Signs (24h Range):  Temp:  [97.2 °F (36.2 °C)-97.6 °F (36.4 °C)] 97.2 °F (36.2 °C)  Pulse:  [66-68] 66  Resp:  [18-20] 20  SpO2:  [95 %-99 %] 95 %  BP: ()/(49-78) 147/78     Weight: 85.3 kg (188 lb 0.8 oz)  Body mass index is 31.29 kg/(m^2).    SpO2: 95 %  O2 Device (Oxygen Therapy): room air    No intake or output data in the 24 hours ending 03/07/17 1532    Lines/Drains/Airways     Peripheral Intravenous Line                 Peripheral IV - Single Lumen 03/07/17 1034 Left Antecubital less than 1 day                Physical Exam  General appearance: alert, appears stated age and cooperative  Head: Normocephalic, without obvious abnormality, atraumatic  Eyes: conjunctivae/corneas clear. PERRL  Neck: no carotid bruit, no JVD and supple, symmetrical, trachea midline  Lungs: clear to auscultation bilaterally, normal respiratory effort  Chest Wall: no tenderness  Heart: regular rate and rhythm, S1, S2 normal, systolic murmur 3/6, no click, rub or gallop  Abdomen: soft, non-tender; bowel sounds normal; no masses,  no organomegaly  Extremities: Extremities normal, atraumatic, no cyanosis, clubbing, or edema  Pulses: Dorsalis Pedis R: 2+ (normal)/L: 2+ (normal)  Skin: Skin color, texture, turgor normal. No rashes or lesions  Neurologic: Normal mood and affect  Alert and oriented X 3  Significant Labs:   ABG: No results for input(s): PH, PCO2, HCO3, POCSATURATED, BE in the last 48 hours., Blood Culture: No results for input(s): LABBLOO in the last 48 hours., BMP:   Recent Labs  Lab 03/07/17  1042   *      K 3.8      CO2 25   BUN 21   CREATININE 0.9   CALCIUM 8.8   , CMP   Recent Labs  Lab 03/07/17  1042      K 3.8      CO2 25   *   BUN 21   CREATININE 0.9   CALCIUM 8.8   PROT 5.7*   ALBUMIN 3.3*   BILITOT 1.0   ALKPHOS 82   AST 19   ALT 17   ANIONGAP 9   ESTGFRAFRICA >60   EGFRNONAA 58*   , CBC   Recent Labs  Lab 03/07/17  1042   WBC 3.14*   HGB 13.2   HCT  39.0   *   , INR No results for input(s): INR, PROTIME in the last 48 hours., Lipid Panel No results for input(s): CHOL, HDL, LDLCALC, TRIG, CHOLHDL in the last 48 hours.,   Pathology Results  (Last 10 years)               03/07/17 1042 (A) CBC auto differential (Abnormal) Final result    12/31/16 0340 (A) CBC auto differential (Abnormal) Final result    12/30/16 1559 (A) CBC auto differential (Abnormal) Final result    01/14/16 0810 (A) CBC auto differential (Abnormal) Final result    08/24/15 1133 (A) CBC auto differential (Abnormal) Final result    07/16/15 0842 (A) CBC auto differential (Abnormal) Final result    01/15/15 0815 (A) CBC auto differential (Abnormal) Final result    07/10/14 0856 (A) CBC auto differential (Abnormal) Final result    01/10/14 0750 (A) CBC auto differential (Abnormal) Final result    06/24/13 0745  CBC auto differential Final result    Narrative:                                 VALUE         REFERENCE RANGE  UNITS   -------------------------------------------------------------------   WBC                              4.4 L            4.80-10.80  thous/uL     RBC                             4.87               4.00-5.40  mill/uL      HGB                             15.8               12.0-16.0  g/dL         HCT                             46.7              37.0-48.50  %            MCV                               96 H                 82-95  fL           MCH                             32.4 H             27.0-31.0  pg           MCHC                            33.8               32.0-36.0  g/dL         RDW                             12.8             11.50-14.50  %            PLATELET COUNT                   130 L               150-350  10           MPV                              9.3              9.20-12.90  fL           SEGS                              56                   38-73  %            LYM                               30                   21-44  %            MONO                               11 H                0-7.40  %            EO                                 3                  0-4.20  %            BASO                               0                  0-1.90  %            #BEST                               2                      10  X            #LYMPHS                            1                      10  X            #MONOS                             0                      10  X            #EOS                               0                      10  X            #BASO                              0                      10  X            MANUAL DIFF                       NO                                            COMMENTS:  _____________________________________________________             ___________________________________________________________             ___________________________________________________________             ___________________________________________________________       12/21/12 0750  CBC auto differential Final result    Narrative:                                 VALUE         REFERENCE RANGE  UNITS   -------------------------------------------------------------------   WBC                              5.3              4.80-10.80  thous/uL     RBC                             4.76               4.00-5.40  mill/uL      HGB                             15.7               12.0-16.0  g/dL         HCT                             46.5              37.0-48.50  %            MCV                               98 H                 82-95  fL           MCH                             33.0 H             27.0-31.0  pg           MCHC                            33.8               32.0-36.0  g/dL         RDW                             12.3             11.50-14.50  %            PLATELET COUNT                   130 L               150-350  10           MPV                              9.6              9.20-12.90  fL           SEGS                              52                    38-73  %            LYM                               36                   21-44  %            MONO                              10 H                0-7.40  %            EO                                 2                  0-4.20  %            BASO                               0                  0-1.90  %            #BEST                               3                      10  X            #LYMPHS                            2                      10  X            #MONOS                             1 H                    10  X            #EOS                               0                      10  X            #BASO                              0                      10  X            MANUAL DIFF                       NO                                            COMMENTS:  _____________________________________________________             ___________________________________________________________             ___________________________________________________________             ___________________________________________________________          and Troponin   Recent Labs  Lab 03/07/17  1042   TROPONINI <0.006       Significant Imaging: Cardiac Cath: , CT scan: CT ABDOMEN PELVIS WITH CONTRAST: No results found for this visit on 03/07/17. and CT ABDOMEN PELVIS WITHOUT CONTRAST:   Results for orders placed or performed during the hospital encounter of 03/07/17   CT Abdomen Pelvis  Without Contrast    Narrative    Technique: 5 mm CT images of the abdomen and pelvis were obtained without contrast.    Results: There is bibasilar subsegmental atelectasis.  A calcified granuloma is seen in the left lower lobe.  The base of the heart shows leads from a pacer device.  Calcified coronary artery disease is noted.  Calcified atheromatous disease affects the aorta and its major branch vessels.    Postoperative changes of cholecystectomy are identified.  Extensive pneumobilia is noted.  There is high density  material seen within the distal CBD which could be related to reflux contrast material versus a distal stone.  There is also some high density material seen within central bowel the similar differential.  The liver, spleen, and adrenal glands are unremarkable.  The pancreas atrophic with fatty infiltration.  Both kidneys are normal in size, shape and contour.  No nephrolithiasis, ureterolithiasis, hydronephrosis or hydroureter is seen.  The urinary bladder is well-distended and within normal limits.  Calcified lesion is seen in the left adnexa.  The uterus has been removed.    Diverticulosis coli is visualized without diverticulitis.  The appendix is normal.  No free air, free fluid or obstruction is detected.  No pathologically enlarged abdominal or pelvic lymph nodes are seen.    The bones are osteopenic and show age-appropriate degenerative change.    Impression    Postoperative changes of cholecystectomy are noted.  There is extensive pneumobilia with high density material seen within the distal common bile duct as well as within some intrahepatic bile ducts.  This could be related to calcification versus reflux contrast material.  No comparison is available to determine the stability of these findings.    Nonenlarged, calcified left ovarian lesion of uncertain significance.    Diverticulosis coli without diverticulitis.    Calcified coronary artery disease and Calcified atheromatous disease of the aorta and its major branch vessels.        Electronically signed by: Nica Herrera MD  Date:     03/07/17  Time:    12:21    , Echocardiogram: 2D echo with color flow doppler: No results found for this or any previous visit., EKG: , Stress Test:  and X-Ray:   Assessment and Plan:   N/V/D-dehydration-     Hypotension due to hypovolemia from D/V    Moderate AS with h/o syncope 12/17 MAURIZIO 1.1  H/o SSS s/p PM    TTE- 2/13/17 EF 65%, Grade 1 diastolic dysfunction, LVH, moderate aortic stenosis mead gradient 9.7, 1+MR, 1+TR,  PAP 30    MPI- 9/5/13 no evidence of ischemia ; CAD stable s/p PCI 2006    Carotid US- 1/6/17- no significant disease    PLAN:hold BP meds and restart when BP above 140; try low dose lisinopril 5 mg  Hydrate  DVT prophylaxis  Consider GI workup  Carvedilol 25,asa,statin  Hold lasix    Active Diagnoses:    Diagnosis Date Noted POA    Dehydration [E86.0] 03/07/2017 Yes      Problems Resolved During this Admission:    Diagnosis Date Noted Date Resolved POA       VTE Risk Mitigation         Ordered     Medium Risk of VTE  Once      03/07/17 1416     Place sequential compression device  Until discontinued      03/07/17 1416            Breezy Bynum, KEILA  Cardiology   [facility]    I attest that I have personally seen and examined this patient. I have reviewed and discussed the management in detail as outlined above.

## 2017-03-07 NOTE — IP AVS SNAPSHOT
[address] [address]  Phone: [phone]           Patient Discharge Instructions     Our goal is to set you up for success. This packet includes information on your condition, medications, and your home care. It will help you to care for yourself so you don't get sicker and need to go back to the hospital.     Please ask your nurse if you have any questions.        There are many details to remember when preparing to leave the hospital. Here is what you will need to do:    1. Take your medicine. If you are prescribed medications, review your Medication List in the following pages. You may have new medications to  at the pharmacy and others that you'll need to stop taking. Review the instructions for how and when to take your medications. Talk with your doctor or nurses if you are unsure of what to do.     2. Go to your follow-up appointments. Specific follow-up information is listed in the following pages. Your may be contacted by a transition nurse or clinical provider about future appointments. Be sure we have all of the phone numbers to reach you, if needed. Please contact your provider's office if you are unable to make an appointment.     3. Watch for warning signs. Your doctor or nurse will give you detailed warning signs to watch for and when to call for assistance. These instructions may also include educational information about your condition. If you experience any of warning signs to your health, call your doctor.               ** Verify the list of medication(s) below is accurate and up to date. Carry this with you in case of emergency. If your medications have changed, please notify your healthcare provider.             Medication List      CHANGE how you take these medications        Additional Info                      lisinopril 5 MG tablet   Commonly known as:  PRINIVIL,ZESTRIL   Quantity:  30 tablet   Refills:  1   Dose:  5 mg   What changed:     - medication strength  - how much to take    Start Date:  3/9/2017   Last time this was given:  20 mg on 3/8/2017  8:31 AM   Instructions:  Take 1 tablet (5 mg total) by mouth once daily.     Begin Date    AM    Noon    PM    Bedtime         CONTINUE taking these medications        Additional Info                      aspirin 81 MG EC tablet   Commonly known as:  ECOTRIN   Quantity:  1 Bottle   Refills:  1   Dose:  81 mg    Last time this was given:  81 mg on 3/8/2017  8:31 AM   Instructions:  Take 1 tablet (81 mg total) by mouth once daily.     Begin Date    AM    Noon    PM    Bedtime       carvedilol 25 MG tablet   Commonly known as:  COREG   Quantity:  180 tablet   Refills:  6   Dose:  25 mg    Last time this was given:  25 mg on 3/8/2017  8:31 AM   Instructions:  Take 1 tablet (25 mg total) by mouth 2 (two) times daily.     Begin Date    AM    Noon    PM    Bedtime       * diabetic supplies, miscellan. Misc   Quantity:  1 each   Refills:  0    Instructions:  HUMANA TRUE METRIX AIR METER. USE AS DIRECTED TO TEST BLOOD SUGAR TWICE DAILY.     Begin Date    AM    Noon    PM    Bedtime       * diabetic supplies, miscellan. Misc   Quantity:  100 each   Refills:  12    Instructions:  HUMANA TRUE METRIX TEST STRIPS. USE AS DIRECTED TO TEST BLOOD SUGAR TWICE DAILY     Begin Date    AM    Noon    PM    Bedtime       * diabetic supplies, miscellan. Misc   Quantity:  100 each   Refills:  12    Instructions:  TRUEPLUS SUPER THIN 28G LANCET. USE AS DIRECTED TO TEST BLOOD SUGAR TWICE DAILY.     Begin Date    AM    Noon    PM    Bedtime       furosemide 20 MG tablet   Commonly known as:  LASIX   Quantity:  90 tablet   Refills:  1    Last time this was given:  20 mg on 3/8/2017 11:30 AM   Instructions:  TAKE 1 TABLET ONE TIME every other DAy     Begin Date    AM    Noon    PM    Bedtime       gabapentin 300 MG capsule   Commonly known as:  NEURONTIN   Quantity:  270 capsule   Refills:  1   Dose:  300 mg    Instructions:   Take 1 capsule (300 mg total) by mouth 3 (three) times daily.     Begin Date    AM    Noon    PM    Bedtime       isosorbide mononitrate 60 MG 24 hr tablet   Commonly known as:  IMDUR   Quantity:  90 tablet   Refills:  1    Last time this was given:  60 mg on 3/8/2017  8:31 AM   Instructions:  TAKE 1 TABLET ONE TIME DAILY     Begin Date    AM    Noon    PM    Bedtime       lorazepam 0.5 MG tablet   Commonly known as:  ATIVAN   Quantity:  90 tablet   Refills:  1    Instructions:  TAKE 1 TABLET EVERY 8 HOURS AS NEEDED     Begin Date    AM    Noon    PM    Bedtime       metformin 500 MG tablet   Commonly known as:  GLUCOPHAGE   Quantity:  90 tablet   Refills:  3   Dose:  500 mg    Instructions:  Take 1 tablet (500 mg total) by mouth every evening.     Begin Date    AM    Noon    PM    Bedtime       nitroGLYCERIN 0.4 MG SL tablet   Commonly known as:  NITROSTAT   Refills:  0   Dose:  0.4 mg    Instructions:  Place 0.4 mg under the tongue every 5 (five) minutes as needed.     Begin Date    AM    Noon    PM    Bedtime       omega-3 fatty acids-vitamin E 1,000 mg Cap   Commonly known as:  FISH OIL   Quantity:  90 each   Refills:  1   Dose:  1 capsule    Instructions:  Take 1 capsule by mouth once daily.     Begin Date    AM    Noon    PM    Bedtime       pravastatin 20 MG tablet   Commonly known as:  PRAVACHOL   Quantity:  90 tablet   Refills:  1   Dose:  20 mg    Instructions:  Take 1 tablet (20 mg total) by mouth nightly.     Begin Date    AM    Noon    PM    Bedtime       vitamin D 1000 units Tab   Refills:  0   Dose:  185 mg    Instructions:  Take 185 mg by mouth once daily.     Begin Date    AM    Noon    PM    Bedtime       * Notice:  This list has 3 medication(s) that are the same as other medications prescribed for you. Read the directions carefully, and ask your doctor or other care provider to review them with you.         Where to Get Your Medications      These medications were sent to [facility]  Delivery - [place], [place] - [address]  [address], [facility] [address]     Phone:  [phone]     lisinopril 5 MG tablet                  Please bring to all follow up appointments:    1. A copy of your discharge instructions.  2. All medicines you are currently taking in their original bottles.  3. Identification and insurance card.    Please arrive 15 minutes ahead of scheduled appointment time.    Please call 24 hours in advance if you must reschedule your appointment and/or time.        Your Scheduled Appointments     Mar 15, 2017  1:30 PM CDT   Established Patient Visit with Junior Godinez MD   [facility]    [address] [facility] [id number] [phone]            Apr 10, 2017 10:00 AM CDT   Established Patient Visit with Junior Godinez MD   [facility] ([facility]    [address] [facility] [id number] [phone]              Follow-up Information     Follow up with [facility].    Specialty:  Home Health Services    Why:  Home Health    Contact information:    [address] [facility] [address]  [phone]          Follow up with Junior Godinez MD In 1 week.    Specialty:  Internal Medicine    Why:  3/15 at 1:30pm     Contact information:    [address]  [facility] [address]  [phone]          Follow up with Vasu Peralta MD In 1 week.    Specialty:  Cardiology    Why:  appt 3/13 at 3pm     Contact information:    [address]   [facility] [address]  [phone]        Referrals     Future Orders    Ambulatory referral to Home Health         Discharge Instructions     Future Orders    Activity as tolerated     Diet general     Questions:    Total calories:      Fat restriction, if any:      Protein restriction, if any:      Na restriction, if any:      Fluid restriction:      Additional restrictions:          Discharge Instructions         Dehydration (Adult)  Dehydration occurs when your body  loses too much fluid. This may be the result of prolonged vomiting or diarrhea, excessive sweating, or a high fever. It may also happen if you dont drink enough fluid when youre sick or out in the heat. Misuse of diuretics (water pills) can also be a cause.  Symptoms include thirst and decreased urine output. You may also feel dizzy, weak, fatigued, or very drowsy. The diet described below is usually enough to treat dehydration. In some cases, you may need medicine.  Home care  · Drink at least 12 8-ounce glasses of fluid every day to resolve the dehydration. Fluid may include water; orange juice; lemonade; apple, grape, or cranberry juice; clear fruit drinks; electrolyte replacement and sports drinks; and teas and coffee without caffeine. If you have been diagnosed with a kidney disease, ask your doctor how much and what types of fluids you should drink to prevent dehydration. If you have kidney disease, fluid can build up in the body. This can be dangerous to your health.  · If you have a fever, muscle aches, or a headache as a result of a cold or flu, you may take acetaminophen or ibuprofen, unless another medicine was prescribed. If you have chronic liver or kidney disease, or have ever had a stomach ulcer or gastrointestinal bleeding, talk with your health care provider before using these medicines. Don't take aspirin if you are younger than 18 and have a fever. Aspirin raises the chance for severe liver injury.  Follow-up care  Follow up with your health care provider, or as advised.  When to seek medical advice  Call your health care provider right away if any of these occur:  · Continued vomiting  · Frequent diarrhea (more than 5 times a day); blood (red or black color) or mucus in diarrhea  · Blood in vomit or stool  · Swollen abdomen or increasing abdominal pain  · Weakness, dizziness, or fainting  · Unusual drowsiness or confusion  · Reduced urine output or extreme thirst  · Fever of 100.4°F (34°C) or  "higher  Date Last Reviewed: 5/31/2015  © 4802-1121 [employer] [address] [place] [address], [place] [address]. All rights reserved. This information is not intended as a substitute for professional medical care. Always follow your healthcare professional's instructions.          Discharge References/Attachments     MOBILITY: USING WALKER (ENGLISH)        Primary Diagnosis     Your primary diagnosis was:  Not on File      Admission Information     Date & Time Provider Department CSN    3/7/2017  9:51 AM Junior Godinez MD [facility] [id number]      Care Providers     Provider Role Specialty Primary office phone    Junior Godinez MD Attending Provider Internal Medicine [phone]    Vasu Peralta MD Consulting Physician  Cardiology [phone]      Important Medicare Message          Most Recent Value    Important Message from Medicare Regarding Discharge Appeal Rights  Given to patient/caregiver, Explained to patient/caregiver, Signed/date by patient/caregiver yes 03/08/2017 1124      Your Vitals Were     BP Pulse Temp Resp Height Weight    115/56 62 97.1 °F (36.2 °C) (Oral) 18 5' 5" (1.651 m) 85.3 kg (188 lb 0.8 oz)    SpO2 BMI             94% 31.29 kg/m2         Recent Lab Values        12/21/2012 6/24/2013 1/10/2014 7/10/2014 1/15/2015 7/16/2015 1/14/2016 12/30/2016      7:50 AM 10:19 AM  7:50 AM  8:56 AM  8:15 AM  8:42 AM  8:10 AM  9:43 PM    A1C 6.3 (H) 6.3 (H) 6.5 (H) 6.2 6.4 (H) 6.3 (H) 6.3 (H) 5.8    Comment for A1C at  9:43 PM on 12/30/2016:  According to ADA guidelines, hemoglobin A1C <7.0% represents  optimal control in non-pregnant diabetic patients.  Different  metrics may apply to specific populations.   Standards of Medical Care in Diabetes - 2016.  For the purpose of screening for the presence of diabetes:  <5.7%     Consistent with the absence of diabetes  5.7-6.4%  Consistent with increasing risk for diabetes   (prediabetes)  >or=6.5%  Consistent with " diabetes  Currently no consensus exists for use of hemoglobin A1C  for diagnosis of diabetes for children.        Pending Labs     Order Current Status    Hemoglobin A1c if not done in past 6 weeks In process    Blood culture Preliminary result      Allergies as of 3/8/2017        Reactions    Iodinated Contrast Media - Iv Dye Hives      [facility]     [facility] Nurse Care Line - 24/7 Assistance  Unless otherwise directed by your provider, please contact [facility], our nurse care line that is available for 24/7 assistance.     Registered nurses in the [facility] provide clinical advisement, health education, appointment booking, and other advisory services.  Call for this free service at [phone].        Advance Directives     An advance directive is a document which, in the event you are no longer able to make decisions for yourself, tells your healthcare team what kind of treatment you do or do not want to receive, or who you would like to make those decisions for you.  If you do not currently have an advance directive, [facility] encourages you to create one.  For more information call:  [phone]), [phone] ([phone]),  or log on to [email or web address].        Language Assistance Services     ATTENTION: Language assistance services are available, free of charge. Please call [phone].      ATENCIÓN: Si habla español, tiene a la disposición servicios gratuitos de asistencia lingüística. Llame al [phone].     CHÚ Ý: N?u b?n nói Ti?ng Vi?t, có các d?ch v? h? tr? ngôn ng? mi?n phí dành cho b?n. G?i s? [phone].        Diabetes Discharge Instructions                                   [facility] Sign-Up     Activating your [facility] account is as easy as 1-2-3!     1) Visit [email or web address], select Sign Up Now, enter this activation code and your date of birth, then select Next.  Activation code not generated  Current Patient Portal Status:  Account disabled      2) Create a username and password to use when you visit [facility] in the future and select a security question in case you lose your password and select Next.    3) Enter your e-mail address and click Sign Up!    Additional Information  If you have questions, please e-mail [email or web address] or call [phone] to talk to our [facility] staff. Remember, [facility] is NOT to be used for urgent needs. For medical emergencies, dial 911.          [facility] complies with applicable Federal civil rights laws and does not discriminate on the basis of race, color, national origin, age, disability, or sex.

## 2017-03-07 NOTE — PROGRESS NOTES
Unable to speak with patient at this time.  Will speak with her at a more convenient time for her.

## 2017-03-08 VITALS
SYSTOLIC BLOOD PRESSURE: 115 MMHG | DIASTOLIC BLOOD PRESSURE: 56 MMHG | HEART RATE: 62 BPM | BODY MASS INDEX: 31.33 KG/M2 | WEIGHT: 188.06 LBS | HEIGHT: 65 IN | TEMPERATURE: 97 F | RESPIRATION RATE: 18 BRPM | OXYGEN SATURATION: 94 %

## 2017-03-08 LAB
ALBUMIN SERPL BCP-MCNC: 3 G/DL
ALP SERPL-CCNC: 70 U/L
ALT SERPL W/O P-5'-P-CCNC: 17 U/L
ANION GAP SERPL CALC-SCNC: 9 MMOL/L
AST SERPL-CCNC: 20 U/L
BASOPHILS # BLD AUTO: 0 K/UL
BASOPHILS NFR BLD: 0 %
BILIRUB SERPL-MCNC: 0.7 MG/DL
BUN SERPL-MCNC: 13 MG/DL
CALCIUM SERPL-MCNC: 8.5 MG/DL
CHLORIDE SERPL-SCNC: 108 MMOL/L
CO2 SERPL-SCNC: 24 MMOL/L
CREAT SERPL-MCNC: 0.8 MG/DL
DIFFERENTIAL METHOD: ABNORMAL
EOSINOPHIL # BLD AUTO: 0 K/UL
EOSINOPHIL NFR BLD: 1.8 %
ERYTHROCYTE [DISTWIDTH] IN BLOOD BY AUTOMATED COUNT: 13.4 %
EST. GFR  (AFRICAN AMERICAN): >60 ML/MIN/1.73 M^2
EST. GFR  (NON AFRICAN AMERICAN): >60 ML/MIN/1.73 M^2
GLUCOSE SERPL-MCNC: 90 MG/DL
HCT VFR BLD AUTO: 36.1 %
HGB BLD-MCNC: 12.5 G/DL
LYMPHOCYTES # BLD AUTO: 0.9 K/UL
LYMPHOCYTES NFR BLD: 38.6 %
MCH RBC QN AUTO: 32 PG
MCHC RBC AUTO-ENTMCNC: 34.6 %
MCV RBC AUTO: 92 FL
MONOCYTES # BLD AUTO: 0.3 K/UL
MONOCYTES NFR BLD: 12.3 %
NEUTROPHILS # BLD AUTO: 1 K/UL
NEUTROPHILS NFR BLD: 47.3 %
PLATELET # BLD AUTO: 112 K/UL
PMV BLD AUTO: 9.1 FL
POCT GLUCOSE: 112 MG/DL (ref 70–110)
POCT GLUCOSE: 72 MG/DL (ref 70–110)
POTASSIUM SERPL-SCNC: 3.8 MMOL/L
PROT SERPL-MCNC: 5.1 G/DL
RBC # BLD AUTO: 3.91 M/UL
SODIUM SERPL-SCNC: 141 MMOL/L
TROPONIN I SERPL DL<=0.01 NG/ML-MCNC: 0.01 NG/ML
WBC # BLD AUTO: 2.2 K/UL

## 2017-03-08 PROCEDURE — 85025 COMPLETE CBC W/AUTO DIFF WBC: CPT

## 2017-03-08 PROCEDURE — 97162 PT EVAL MOD COMPLEX 30 MIN: CPT

## 2017-03-08 PROCEDURE — 84484 ASSAY OF TROPONIN QUANT: CPT

## 2017-03-08 PROCEDURE — G0378 HOSPITAL OBSERVATION PER HR: HCPCS

## 2017-03-08 PROCEDURE — 36415 COLL VENOUS BLD VENIPUNCTURE: CPT

## 2017-03-08 PROCEDURE — 93005 ELECTROCARDIOGRAM TRACING: CPT

## 2017-03-08 PROCEDURE — G8978 MOBILITY CURRENT STATUS: HCPCS | Mod: CK

## 2017-03-08 PROCEDURE — 25000003 PHARM REV CODE 250: Performed by: NURSE PRACTITIONER

## 2017-03-08 PROCEDURE — 80053 COMPREHEN METABOLIC PANEL: CPT

## 2017-03-08 PROCEDURE — 25000003 PHARM REV CODE 250: Performed by: INTERNAL MEDICINE

## 2017-03-08 PROCEDURE — 96361 HYDRATE IV INFUSION ADD-ON: CPT

## 2017-03-08 PROCEDURE — 27000339 *HC DAILY SUPPLY KIT

## 2017-03-08 PROCEDURE — 25000003 PHARM REV CODE 250: Performed by: SURGERY

## 2017-03-08 PROCEDURE — 97116 GAIT TRAINING THERAPY: CPT

## 2017-03-08 PROCEDURE — 99217 PR OBSERVATION CARE DISCHARGE: CPT | Mod: ,,, | Performed by: FAMILY MEDICINE

## 2017-03-08 PROCEDURE — 82962 GLUCOSE BLOOD TEST: CPT | Mod: 91

## 2017-03-08 PROCEDURE — G8979 MOBILITY GOAL STATUS: HCPCS | Mod: CJ

## 2017-03-08 PROCEDURE — G8980 MOBILITY D/C STATUS: HCPCS | Mod: CK

## 2017-03-08 RX ORDER — ISOSORBIDE MONONITRATE 60 MG/1
60 TABLET, EXTENDED RELEASE ORAL DAILY
Status: DISCONTINUED | OUTPATIENT
Start: 2017-03-08 | End: 2017-03-08 | Stop reason: HOSPADM

## 2017-03-08 RX ORDER — LISINOPRIL 2.5 MG/1
5 TABLET ORAL DAILY
Status: DISCONTINUED | OUTPATIENT
Start: 2017-03-09 | End: 2017-03-08 | Stop reason: HOSPADM

## 2017-03-08 RX ORDER — FUROSEMIDE 20 MG/1
20 TABLET ORAL DAILY
Status: DISCONTINUED | OUTPATIENT
Start: 2017-03-08 | End: 2017-03-08 | Stop reason: HOSPADM

## 2017-03-08 RX ORDER — CARVEDILOL 25 MG/1
25 TABLET ORAL 2 TIMES DAILY
Status: DISCONTINUED | OUTPATIENT
Start: 2017-03-08 | End: 2017-03-08 | Stop reason: HOSPADM

## 2017-03-08 RX ORDER — LISINOPRIL 5 MG/1
5 TABLET ORAL DAILY
Qty: 30 TABLET | Refills: 1 | Status: SHIPPED | OUTPATIENT
Start: 2017-03-09 | End: 2017-06-19 | Stop reason: DRUGHIGH

## 2017-03-08 RX ORDER — CLONIDINE HYDROCHLORIDE 0.1 MG/1
0.1 TABLET ORAL ONCE
Status: COMPLETED | OUTPATIENT
Start: 2017-03-08 | End: 2017-03-08

## 2017-03-08 RX ADMIN — PANTOPRAZOLE SODIUM 40 MG: 40 TABLET, DELAYED RELEASE ORAL at 08:03

## 2017-03-08 RX ADMIN — LISINOPRIL 20 MG: 20 TABLET ORAL at 08:03

## 2017-03-08 RX ADMIN — FUROSEMIDE 20 MG: 20 TABLET ORAL at 11:03

## 2017-03-08 RX ADMIN — SODIUM CHLORIDE, SODIUM LACTATE, POTASSIUM CHLORIDE, AND CALCIUM CHLORIDE: .6; .31; .03; .02 INJECTION, SOLUTION INTRAVENOUS at 01:03

## 2017-03-08 RX ADMIN — ASPIRIN 81 MG: 81 TABLET, COATED ORAL at 08:03

## 2017-03-08 RX ADMIN — CARVEDILOL 25 MG: 25 TABLET, FILM COATED ORAL at 08:03

## 2017-03-08 RX ADMIN — ISOSORBIDE MONONITRATE 60 MG: 60 TABLET, EXTENDED RELEASE ORAL at 08:03

## 2017-03-08 RX ADMIN — CLONIDINE HYDROCHLORIDE 0.1 MG: 0.1 TABLET ORAL at 03:03

## 2017-03-08 NOTE — DISCHARGE SUMMARY
[facility]  Discharge Summary      Patient Name: Erin Sahu  MRN: [id number]  Admission Date: 3/7/2017  Hospital Length of Stay: 0 days  Discharge Date and Time:  03/08/2017 11:16 AM  Attending Physician: Junior Godinez MD   Discharging Provider: Liberty Barnes NP  Primary Care Provider: Junior Godinez MD      HPI:   Chief Complaint  87 y.o. female with Hypotension; Emesis; and Nausea since Sat (4 days)     History of Present Illness  Erin Sahu with DM-2, HTN, hyperlipidemia presented  to the emergency room with hypotension and nausea vomiting;   with similar symptoms    Pt was seen at the [facility] urgent care with nausea vomiting and diarrhea   Pt on a systolic blood pressure in the 70s in that clinic, referred to the ER for treatment  Pt has had nausea vomiting and diarrhea since Saturday night with low blood pressure  Patient has had issues with hypotension in the past, BP meds often dropper BP greatly  Patient states this hypotension is worse with the diarrhea, SBP of 84 arrival in the ER     The history is provided by the patient      * No surgery found *      Indwelling Lines/Drains at time of discharge:   Lines/Drains/Airways          No matching active lines, drains, or airways        Hospital Course:   She did well over night. Had one stool. No longer with N/V. BP much better. 84/49 on admission. IVF started on LR 50ml/HR.  Now bp 175/74     Consults:   Consults         Status Ordering Provider     Inpatient consult to Cardiology-[facility]  Once     Provider:  Vasu ePralta MD    Completed [facility]          Significant Diagnostic Studies:     CBC:     Recent Labs  Lab 03/07/17  1042 03/08/17  0444   WBC 3.14* 2.20*   HGB 13.2 12.5   HCT 39.0 36.1*   * 112*     CMP:     Recent Labs  Lab 03/07/17  1042 03/08/17  0444    141   K 3.8 3.8    108   CO2 25 24   * 90   BUN 21 13   CREATININE 0.9 0.8   CALCIUM 8.8  8.5*   PROT 5.7* 5.1*   ALBUMIN 3.3* 3.0*   BILITOT 1.0 0.7   ALKPHOS 82 70   AST 19 20   ALT 17 17   ANIONGAP 9 9   EGFRNONAA 58* >60     Troponin:     Recent Labs  Lab 03/07/17  1644 03/07/17  2248 03/08/17  0444   TROPONINI 0.008 0.018 0.010   Lactate 1.1  BNP    Recent Labs  Lab 03/07/17  1042   BNP 93     Urinalysis    Recent Labs  Lab 03/07/17  1453   COLORU Yellow   SPECGRAV <=1.005*   PHUR 5.0   PROTEINUA Negative   NITRITE Negative   LEUKOCYTESUR Negative   UROBILINOGEN Negative     Blood cultures NGTD    Significant Imaging:   CT abd and pelvis  Postoperative changes of cholecystectomy are noted.  There is extensive pneumobilia with high density material seen within the distal common bile duct as well as within some intrahepatic bile ducts.  This could be related to calcification versus reflux contrast material.  No comparison is available to determine the stability of these findings.    Nonenlarged, calcified left ovarian lesion of uncertain significance.    Diverticulosis coli without diverticulitis.    Calcified coronary artery disease and Calcified atheromatous disease of the aorta and its major branch vessels.      Chest, one view: The lungs are clear.  The heart is normal in size.  No mediastinal, hilar or pleural abnormalities are detected.  The skeletal structures are intact.  Left-sided dual-lead pacemaker. No acute process    EKG Sinus rhythm with 1st degree A-V block  Left axis deviation  Right bundle branch block  Nonspecific T wave abnormality  Abnormal ECG  When compared with ECG of 30-DEC-2016 15:52,  Sinus rhythm has replaced Electronic atrial pacemaker  T wave inversion now evident in Inferior leads  Confirmed by REGINA GEORGE MD ([id number]) on 3/7/2017 1:21:09 PM      Pending Diagnostic Studies:     Procedure Component Value Units Date/Time    Hemoglobin A1c if not done in past 6 weeks [[id number]] Collected:  03/07/17 1042    Order Status:  Sent Lab Status:  In process Updated:  03/07/17 3559     Specimen:  Blood from Blood         Final Active Diagnoses:    Diagnosis Date Noted POA    PRINCIPAL PROBLEM:  Nausea, vomiting and diarrhea [R11.2, R19.7] 03/07/2017 Yes    Dehydration [E86.0] 03/07/2017 Yes    Essential hypertension [I10] 07/23/2015 Yes    Diabetes mellitus with neuropathy [E11.40] 12/31/2012 Yes    Anxiety disorder [F41.9] 12/31/2012 Yes      Problems Resolved During this Admission:    Diagnosis Date Noted Date Resolved POA      * Nausea, vomiting and diarrhea  Resolved  Regular diet for lunch and if tolerates will be d/c  PT to assess strength  Will need home health at d/c to watch bp        Diabetes mellitus with neuropathy  Insulin sliding scale BS   Hold metformin for now- resume at home      Anxiety disorder  Ativan as needed at home      Essential hypertension  bp now elevated, restart coreg at home dose, restart lisinopril (but lower dose per cards) and imdur, hold fluids, resume lasix for now      Dehydration  Resolved, hold fluids          Discharged Condition: good    Disposition: Home or Self Care    Follow Up:  Follow-up Information     Follow up with [facility].    Specialty:  Home Health Services    Why:  Home Health    Contact information:    [address] [facility] [id number] [phone]          Follow up with Junior Godinez MD In 1 week.    Specialty:  Internal Medicine    Contact information:    [address]  [facility] [address]  [phone]          Follow up with Vasu Peralta MD In 1 week.    Specialty:  Cardiology    Contact information:    [address] [facility] [id number] [phone]          Patient Instructions:     Ambulatory referral to Home Health   Referral Priority: Routine Referral Type: Home Health   Referral Reason: Specialty Services Required    Requested Specialty: Home Health Services    Number of Visits Requested: 1      Diet general     Activity as tolerated       Medications:  Reconciled Home Medications:   Current  Discharge Medication List      CONTINUE these medications which have CHANGED    Details   lisinopril (PRINIVIL,ZESTRIL) 5 MG tablet Take 1 tablet (5 mg total) by mouth once daily.  Qty: 30 tablet, Refills: 1         CONTINUE these medications which have NOT CHANGED    Details   aspirin (ECOTRIN) 81 MG EC tablet Take 1 tablet (81 mg total) by mouth once daily.  Qty: 1 Bottle, Refills: 1      carvedilol (COREG) 25 MG tablet Take 1 tablet (25 mg total) by mouth 2 (two) times daily.  Qty: 180 tablet, Refills: 6    Associated Diagnoses: Essential hypertension      furosemide (LASIX) 20 MG tablet TAKE 1 TABLET ONE TIME every other DAy  Qty: 90 tablet, Refills: 1    Associated Diagnoses: Essential hypertension      gabapentin (NEURONTIN) 300 MG capsule Take 1 capsule (300 mg total) by mouth 3 (three) times daily.  Qty: 270 capsule, Refills: 1      isosorbide mononitrate (IMDUR) 60 MG 24 hr tablet TAKE 1 TABLET ONE TIME DAILY  Qty: 90 tablet, Refills: 1      metformin (GLUCOPHAGE) 500 MG tablet Take 1 tablet (500 mg total) by mouth every evening.  Qty: 90 tablet, Refills: 3    Associated Diagnoses: Controlled type 2 diabetes mellitus without complication, without long-term current use of insulin      omega-3 fatty acids-vitamin E (FISH OIL) 1,000 mg Cap Take 1 capsule by mouth once daily.  Qty: 90 each, Refills: 1      pravastatin (PRAVACHOL) 20 MG tablet Take 1 tablet (20 mg total) by mouth nightly.  Qty: 90 tablet, Refills: 1      vitamin D 185 MG Tab Take 185 mg by mouth once daily.        !! diabetic supplies, miscellan. Misc HUMANA TRUE METRIX AIR METER. USE AS DIRECTED TO TEST BLOOD SUGAR TWICE DAILY.  Qty: 1 each, Refills: 0    Associated Diagnoses: Diabetes mellitus without complication; Essential hypertension      !! diabetic supplies, miscellan. Misc HUMANA TRUE METRIX TEST STRIPS. USE AS DIRECTED TO TEST BLOOD SUGAR TWICE DAILY  Qty: 100 each, Refills: 12    Associated Diagnoses: Diabetes mellitus without  complication; Essential hypertension      !! diabetic supplies, miscellan. Misc TRUEPLUS SUPER THIN 28G LANCET. USE AS DIRECTED TO TEST BLOOD SUGAR TWICE DAILY.  Qty: 100 each, Refills: 12    Associated Diagnoses: Diabetes mellitus without complication; Essential hypertension      lorazepam (ATIVAN) 0.5 MG tablet TAKE 1 TABLET EVERY 8 HOURS AS NEEDED  Qty: 90 tablet, Refills: 1      nitroGLYCERIN (NITROSTAT) 0.4 MG SL tablet Place 0.4 mg under the tongue every 5 (five) minutes as needed.         !! - Potential duplicate medications found. Please discuss with provider.        Time spent on the discharge of patient: 20 minutes    Liberty Barnes NP  Department of Hospital Medicine  [facility]

## 2017-03-08 NOTE — PROGRESS NOTES
Nursing Notes  Bedside report received. Pt stable, no complaints at this time. IV is bothering pt and IV pump keeps beeping. Will start a new site shortly.      Huddle Comments

## 2017-03-08 NOTE — PLAN OF CARE
Problem: Patient Care Overview  Goal: Plan of Care Review  Outcome: Ongoing (interventions implemented as appropriate)  BP elevated, clonidine given 2 times during shift. Afebrile, no signs of distress. IVF 125ml/hr. Accu checks, no insulin needed. Lungs clear bilaterally. Pt up to bedside commode with minimal assist and is urinating adequately. Pt has not had a bowel movement this shift. SCD's. Telemetry. Fall precautions, call bell in reach. Pt calls for assistance. Pt agrees with plan of care, no questions at this time.

## 2017-03-08 NOTE — PROGRESS NOTES
This note also relates to the following rows which could not be included:  Pulse - Cannot attach notes to unvalidated device data       03/08/17 0500   Vital Signs   BP (!) 184/82   BP Location Left arm   BP Method Automatic   Patient Position Lying   Assessments (Pre/Post)   Level of Consciousness (AVPU) alert       BP still elevated. Lung sounds are now coarse. Pt denies pain or SOB. MD notified. Who states nothing will be given for BP at this time but decrease her fluids to 50ml/hr. Will continue to monitor pt.

## 2017-03-08 NOTE — NURSING
Pt is discharged instructions and education done. Scripts are  Sent. Educated pt to cut 10mg lisopril in half to make 5mg. To stop taking IMdur per cis.  Home health will see pt

## 2017-03-08 NOTE — PLAN OF CARE
03/08/17 1123   Final Note   Assessment Type Final Discharge Note   Discharge Disposition Home-Health   Discharge planning education complete? Yes   Hospital Follow Up  Appt(s) scheduled? Yes   Discharge plans and expectations educations in teach back method with documentation complete? Yes   Offered [facility] Bedside Delivery? n/a   Discharge/Hospital Encounter Summary to (non-[facility]) PCP n/a   Referral to Outpatient Case Management complete? n/a   Referral to / orders for Home Health Complete? Yes   30 day supply of medicines given at discharge, if documented non-compliance / non-adherence? n/a   Any social issues identified prior to discharge? n/a   Did you assess the readiness or willingness of the family or caregiver to support self management of care? Yes   Right Care Referral Info   Post Acute Recommendation Home-care   Facility Name [facility]   Called and faxed information to [facility].

## 2017-03-08 NOTE — DISCHARGE INSTRUCTIONS
Dehydration (Adult)  Dehydration occurs when your body loses too much fluid. This may be the result of prolonged vomiting or diarrhea, excessive sweating, or a high fever. It may also happen if you dont drink enough fluid when youre sick or out in the heat. Misuse of diuretics (water pills) can also be a cause.  Symptoms include thirst and decreased urine output. You may also feel dizzy, weak, fatigued, or very drowsy. The diet described below is usually enough to treat dehydration. In some cases, you may need medicine.  Home care  · Drink at least 12 8-ounce glasses of fluid every day to resolve the dehydration. Fluid may include water; orange juice; lemonade; apple, grape, or cranberry juice; clear fruit drinks; electrolyte replacement and sports drinks; and teas and coffee without caffeine. If you have been diagnosed with a kidney disease, ask your doctor how much and what types of fluids you should drink to prevent dehydration. If you have kidney disease, fluid can build up in the body. This can be dangerous to your health.  · If you have a fever, muscle aches, or a headache as a result of a cold or flu, you may take acetaminophen or ibuprofen, unless another medicine was prescribed. If you have chronic liver or kidney disease, or have ever had a stomach ulcer or gastrointestinal bleeding, talk with your health care provider before using these medicines. Don't take aspirin if you are younger than 18 and have a fever. Aspirin raises the chance for severe liver injury.  Follow-up care  Follow up with your health care provider, or as advised.  When to seek medical advice  Call your health care provider right away if any of these occur:  · Continued vomiting  · Frequent diarrhea (more than 5 times a day); blood (red or black color) or mucus in diarrhea  · Blood in vomit or stool  · Swollen abdomen or increasing abdominal pain  · Weakness, dizziness, or fainting  · Unusual drowsiness or confusion  · Reduced urine  output or extreme thirst  · Fever of 100.4°F (34°C) or higher  Date Last Reviewed: 5/31/2015  © 6442-7715 [employer]. [address] [place] [address], [place] [address]. All rights reserved. This information is not intended as a substitute for professional medical care. Always follow your healthcare professional's instructions.

## 2017-03-08 NOTE — PROGRESS NOTES
03/07/17 2251   Vital Signs   BP (!) 184/82   BP Location Left arm   BP Method Manual   Patient Position Lying   Assessments (Pre/Post)   Level of Consciousness (AVPU) alert     BP elevated. PRN clonidine given. Will recheck in 1 hour.

## 2017-03-08 NOTE — PT/OT/SLP PROGRESS
Physical Therapy      Erin Sahu  MRN: [id number]    Patient not seen 3/8/2017 p.m. secondary to Unavailable (Comment) (Pt. discharged from hospital). Will follow-up as/if indicated.  Please see PT discharge note.    Marlon Nicolas, PT

## 2017-03-08 NOTE — SUBJECTIVE & OBJECTIVE
Interval History: doing better, no longer hypotensive, 1 stool over night, No N/V  Review of Systems   Constitutional: Negative for chills and fever.   HENT: Negative for congestion, hearing loss, sinus pressure and sore throat.    Eyes: Negative for photophobia.   Respiratory: Negative for cough, choking, chest tightness and wheezing.    Cardiovascular: Negative for chest pain and palpitations.   Gastrointestinal: Positive for diarrhea. Negative for blood in stool, nausea and vomiting.   Genitourinary: Negative for dysuria and hematuria.   Musculoskeletal: Negative for arthralgias and myalgias.   Skin: Negative for pallor.   Neurological: Positive for dizziness and weakness. Negative for numbness.   Hematological: Does not bruise/bleed easily.   Psychiatric/Behavioral: Negative for confusion and suicidal ideas. The patient is not nervous/anxious.      Objective:     Vital Signs (Most Recent):  Temp: 96.6 °F (35.9 °C) (03/08/17 0705)  Pulse: 62 (03/08/17 0809)  Resp: 18 (03/08/17 0705)  BP: (!) 175/74 (03/08/17 0705)  SpO2: 95 % (03/08/17 0809) Vital Signs (24h Range):  Temp:  [96.1 °F (35.6 °C)-97.7 °F (36.5 °C)] 96.6 °F (35.9 °C)  Pulse:  [59-68] 62  Resp:  [18-20] 18  SpO2:  [94 %-97 %] 95 %  BP: (136-188)/(72-84) 175/74     Weight: 85.3 kg (188 lb 0.8 oz)  Body mass index is 31.29 kg/(m^2).    Physical Exam   Constitutional: She is oriented to person, place, and time. She appears well-developed and well-nourished.   HENT:   Head: Normocephalic and atraumatic.   Cardiovascular: Normal rate and regular rhythm.    Murmur heard.  Pulmonary/Chest: Effort normal and breath sounds normal.   Abdominal: Soft. She exhibits no distension. There is no tenderness.   Hyperactive bowel sounds   Musculoskeletal: She exhibits no edema.   Neurological: She is alert and oriented to person, place, and time.   Skin: Skin is warm and dry.   Psychiatric: She has a normal mood and affect. Her behavior is normal. Judgment and thought  content normal.   Nursing note and vitals reviewed.       Significant Labs:   CBC:     Recent Labs  Lab 03/07/17  1042 03/08/17  0444   WBC 3.14* 2.20*   HGB 13.2 12.5   HCT 39.0 36.1*   * 112*     CMP:     Recent Labs  Lab 03/07/17  1042 03/08/17  0444    141   K 3.8 3.8    108   CO2 25 24   * 90   BUN 21 13   CREATININE 0.9 0.8   CALCIUM 8.8 8.5*   PROT 5.7* 5.1*   ALBUMIN 3.3* 3.0*   BILITOT 1.0 0.7   ALKPHOS 82 70   AST 19 20   ALT 17 17   ANIONGAP 9 9   EGFRNONAA 58* >60     Troponin:     Recent Labs  Lab 03/07/17  1644 03/07/17  2248 03/08/17  0444   TROPONINI 0.008 0.018 0.010   Lactate 1.1  BNP    Recent Labs  Lab 03/07/17  1042   BNP 93     Urinalysis    Recent Labs  Lab 03/07/17  1453   COLORU Yellow   SPECGRAV <=1.005*   PHUR 5.0   PROTEINUA Negative   NITRITE Negative   LEUKOCYTESUR Negative   UROBILINOGEN Negative     Blood cultures NGTD    Significant Imaging:   CT abd and pelvis  Postoperative changes of cholecystectomy are noted.  There is extensive pneumobilia with high density material seen within the distal common bile duct as well as within some intrahepatic bile ducts.  This could be related to calcification versus reflux contrast material.  No comparison is available to determine the stability of these findings.    Nonenlarged, calcified left ovarian lesion of uncertain significance.    Diverticulosis coli without diverticulitis.    Calcified coronary artery disease and Calcified atheromatous disease of the aorta and its major branch vessels.      Chest, one view: The lungs are clear.  The heart is normal in size.  No mediastinal, hilar or pleural abnormalities are detected.  The skeletal structures are intact.  Left-sided dual-lead pacemaker. No acute process    EKG Sinus rhythm with 1st degree A-V block  Left axis deviation  Right bundle branch block  Nonspecific T wave abnormality  Abnormal ECG  When compared with ECG of 30-DEC-2016 15:52,  Sinus rhythm has replaced  Electronic atrial pacemaker  T wave inversion now evident in Inferior leads  Confirmed by REGINA GEORGE MD ([id number]) on 3/7/2017 1:21:09 PM

## 2017-03-08 NOTE — PLAN OF CARE
Problem: Patient Care Overview  Goal: Plan of Care Review  Outcome: Ongoing (interventions implemented as appropriate)  Patient admitted to  302. B/P now 136/72. Denies needs. Fall precaution initiated. UA collected. No nausea/emesis noted.  Telemetry. Glucometer check 115. IVF's. No further questions. Agrees with plan of care.

## 2017-03-08 NOTE — PROGRESS NOTES
[facility] Hospital Medicine  Progress Note    Patient Name: Erin Sahu  MRN: [id number]  Patient Class: OP- Observation   Admission Date: 3/7/2017  Length of Stay: 0 days  Attending Physician: Junior Godinez MD  Primary Care Provider: Junior Godinez MD        Subjective:     Principal Problem:Nausea, vomiting and diarrhea    HPI:  Chief Complaint  87 y.o. female with Hypotension; Emesis; and Nausea since Sat (4 days)     History of Present Illness  Erin Sahu with DM-2, HTN, hyperlipidemia presented  to the emergency room with hypotension and nausea vomiting;   with similar symptoms    Pt was seen at the [facility] urgent care with nausea vomiting and diarrhea   Pt on a systolic blood pressure in the 70s in that clinic, referred to the ER for treatment  Pt has had nausea vomiting and diarrhea since Saturday night with low blood pressure  Patient has had issues with hypotension in the past, BP meds often dropper BP greatly  Patient states this hypotension is worse with the diarrhea, SBP of 84 arrival in the ER     The history is provided by the patient      Hospital Course:  She did well over night. Had one stool. No longer with N/V. BP much better. 84/49 on admission. IVF started on LR 50ml/HR.  Now bp 175/74. No complaints this morning and is ready to go home.    Interval History: doing better, no longer hypotensive, 1 stool over night, No N/V  Review of Systems   Constitutional: Negative for chills and fever.   HENT: Negative for congestion, hearing loss, sinus pressure and sore throat.    Eyes: Negative for photophobia.   Respiratory: Negative for cough, choking, chest tightness and wheezing.    Cardiovascular: Negative for chest pain and palpitations.   Gastrointestinal: Positive for diarrhea. Negative for blood in stool, nausea and vomiting.   Genitourinary: Negative for dysuria and hematuria.   Musculoskeletal: Negative for arthralgias and myalgias.   Skin:  Negative for pallor.   Neurological: Positive for dizziness and weakness. Negative for numbness.   Hematological: Does not bruise/bleed easily.   Psychiatric/Behavioral: Negative for confusion and suicidal ideas. The patient is not nervous/anxious.      Objective:     Vital Signs (Most Recent):  Temp: 96.6 °F (35.9 °C) (03/08/17 0705)  Pulse: 62 (03/08/17 0809)  Resp: 18 (03/08/17 0705)  BP: (!) 175/74 (03/08/17 0705)  SpO2: 95 % (03/08/17 0809) Vital Signs (24h Range):  Temp:  [96.1 °F (35.6 °C)-97.7 °F (36.5 °C)] 96.6 °F (35.9 °C)  Pulse:  [59-68] 62  Resp:  [18-20] 18  SpO2:  [94 %-97 %] 95 %  BP: (136-188)/(72-84) 175/74     Weight: 85.3 kg (188 lb 0.8 oz)  Body mass index is 31.29 kg/(m^2).    Physical Exam   Constitutional: She is oriented to person, place, and time. She appears well-developed and well-nourished.   HENT:   Head: Normocephalic and atraumatic.   Cardiovascular: Normal rate and regular rhythm.    Murmur heard.  Pulmonary/Chest: Effort normal and breath sounds normal.   Abdominal: Soft. She exhibits no distension. There is no tenderness.   Hyperactive bowel sounds   Musculoskeletal: She exhibits no edema.   Neurological: She is alert and oriented to person, place, and time.   Skin: Skin is warm and dry.   Psychiatric: She has a normal mood and affect. Her behavior is normal. Judgment and thought content normal.   Nursing note and vitals reviewed.       Significant Labs:   CBC:     Recent Labs  Lab 03/07/17  1042 03/08/17  0444   WBC 3.14* 2.20*   HGB 13.2 12.5   HCT 39.0 36.1*   * 112*     CMP:     Recent Labs  Lab 03/07/17  1042 03/08/17  0444    141   K 3.8 3.8    108   CO2 25 24   * 90   BUN 21 13   CREATININE 0.9 0.8   CALCIUM 8.8 8.5*   PROT 5.7* 5.1*   ALBUMIN 3.3* 3.0*   BILITOT 1.0 0.7   ALKPHOS 82 70   AST 19 20   ALT 17 17   ANIONGAP 9 9   EGFRNONAA 58* >60     Troponin:     Recent Labs  Lab 03/07/17  1644 03/07/17  2248 03/08/17  0444   TROPONINI 0.008 0.018  0.010   Lactate 1.1  BNP    Recent Labs  Lab 03/07/17  1042   BNP 93     Urinalysis    Recent Labs  Lab 03/07/17  1453   COLORU Yellow   SPECGRAV <=1.005*   PHUR 5.0   PROTEINUA Negative   NITRITE Negative   LEUKOCYTESUR Negative   UROBILINOGEN Negative     Blood cultures NGTD    Significant Imaging:   CT abd and pelvis  Postoperative changes of cholecystectomy are noted.  There is extensive pneumobilia with high density material seen within the distal common bile duct as well as within some intrahepatic bile ducts.  This could be related to calcification versus reflux contrast material.  No comparison is available to determine the stability of these findings.    Nonenlarged, calcified left ovarian lesion of uncertain significance.    Diverticulosis coli without diverticulitis.    Calcified coronary artery disease and Calcified atheromatous disease of the aorta and its major branch vessels.      Chest, one view: The lungs are clear.  The heart is normal in size.  No mediastinal, hilar or pleural abnormalities are detected.  The skeletal structures are intact.  Left-sided dual-lead pacemaker. No acute process    EKG Sinus rhythm with 1st degree A-V block  Left axis deviation  Right bundle branch block  Nonspecific T wave abnormality  Abnormal ECG  When compared with ECG of 30-DEC-2016 15:52,  Sinus rhythm has replaced Electronic atrial pacemaker  T wave inversion now evident in Inferior leads  Confirmed by REGINA GEORGE MD ([id number]) on 3/7/2017 1:21:09 PM    Assessment/Plan:      * Nausea, vomiting and diarrhea  Resolved  Regular diet for lunch and if tolerates will be d/c  PT to assess strength  Will need home health at d/c to watch bp        Diabetes mellitus with neuropathy  Insulin sliding scale BS   Hold metformin for now- resume at home      Anxiety disorder  Ativan as needed at home      Essential hypertension  bp now elevated, restart coreg at home dose, restart lisinopril (but lower dose per cards as her  pressure was running low in her last clinic visits) and imdur, hold fluids, resume lasix for now      Dehydration  Resolved, hold fluids        VTE Risk Mitigation         Ordered     Medium Risk of VTE  Once      03/07/17 1416     Place sequential compression device  Until discontinued      03/07/17 1416          Asha Rivera MD  Department of Hospital Medicine   [facility]

## 2017-03-08 NOTE — ASSESSMENT & PLAN NOTE
bp now elevated, restart coreg at home dose, restart lisinopril (but lower dose per cards) and imdur, hold fluids, resume lasix for now

## 2017-03-08 NOTE — H&P
[facility] Hospital Medicine  History & Physical    Patient Name: Erin Sahu  MRN: [id number]  Admission Date: 3/7/2017  Attending Physician: Junior Godinez MD   Primary Care Provider: Junior Godinez MD         Patient information was obtained from patient and ER records.     Subjective:     Principal Problem:Nausea, vomiting and diarrhea    Chief Complaint:   Chief Complaint   Patient presents with    Hypotension    Emesis    Nausea        HPI: Chief Complaint  87 y.o. female with Hypotension; Emesis; and Nausea     History of Present Illness  Erin Sahu with DM-2, HTN, hyperlipidemia presented  to the emergency room with hypotension and nausea vomiting;   with similar symptoms    Pt was seen at the [facility] urgent care with nausea vomiting and diarrhea   Pt on a systolic blood pressure in the 70s in that clinic, referred to the ER for treatment  Pt has had nausea vomiting and diarrhea since Saturday night with low blood pressure  Patient has had issues with hypotension in the past, BP meds often dropper BP greatly  Patient states this hypotension is worse with the diarrhea, SBP of 84 arrival in the ER     The history is provided by the patient      Past Medical History:   Diagnosis Date    Anxiety     Arthritis     Coronary artery disease     Diabetes mellitus type II     Hyperlipidemia     Hypertension        Past Surgical History:   Procedure Laterality Date    BREAST SURGERY      CARDIAC PACEMAKER PLACEMENT  01/23/2009    CHOLECYSTECTOMY      CORONARY ANGIOPLASTY WITH STENT PLACEMENT  03/2001    EYE SURGERY Bilateral 06/2001    cataracts    HYSTERECTOMY      TOTAL KNEE ARTHROPLASTY Right        Review of patient's allergies indicates:   Allergen Reactions    Iodinated contrast media - iv dye Hives       No current facility-administered medications on file prior to encounter.      Current Outpatient Prescriptions on File Prior to Encounter    Medication Sig    aspirin (ECOTRIN) 81 MG EC tablet Take 1 tablet (81 mg total) by mouth once daily.    carvedilol (COREG) 25 MG tablet Take 1 tablet (25 mg total) by mouth 2 (two) times daily.    furosemide (LASIX) 20 MG tablet TAKE 1 TABLET ONE TIME every other DAy    gabapentin (NEURONTIN) 300 MG capsule Take 1 capsule (300 mg total) by mouth 3 (three) times daily.    isosorbide mononitrate (IMDUR) 60 MG 24 hr tablet TAKE 1 TABLET ONE TIME DAILY    lisinopril (PRINIVIL,ZESTRIL) 40 MG tablet Take 0.5 tablets (20 mg total) by mouth once daily.    metformin (GLUCOPHAGE) 500 MG tablet Take 1 tablet (500 mg total) by mouth every evening.    omega-3 fatty acids-vitamin E (FISH OIL) 1,000 mg Cap Take 1 capsule by mouth once daily.    pravastatin (PRAVACHOL) 20 MG tablet Take 1 tablet (20 mg total) by mouth nightly.    vitamin D 185 MG Tab Take 185 mg by mouth once daily.      diabetic supplies, miscellan. Misc HUMANA TRUE METRIX AIR METER. USE AS DIRECTED TO TEST BLOOD SUGAR TWICE DAILY.    diabetic supplies, [employer]. Misc [employer]NA TRUE METRIX TEST STRIPS. USE AS DIRECTED TO TEST BLOOD SUGAR TWICE DAILY    diabetic supplies, [employer]ellan. Misc TRUEPLUS SUPER THIN 28G LANCET. USE AS DIRECTED TO TEST BLOOD SUGAR TWICE DAILY.    lorazepam (ATIVAN) 0.5 MG tablet TAKE 1 TABLET EVERY 8 HOURS AS NEEDED    nitroGLYCERIN (NITROSTAT) 0.4 MG SL tablet Place 0.4 mg under the tongue every 5 (five) minutes as needed.       Family History     None        Social History Main Topics    Smoking status: Never Smoker    Smokeless tobacco: Never Used    Alcohol use No    Drug use: No    Sexual activity: Not on file     Review of Systems   Constitutional: Negative for chills and fever.   HENT: Negative for congestion, hearing loss, sinus pressure and sore throat.    Eyes: Negative for photophobia.   Respiratory: Negative for cough, choking, chest tightness and wheezing.    Cardiovascular: Negative for chest pain and  palpitations.   Gastrointestinal: Positive for diarrhea, nausea and vomiting. Negative for blood in stool.   Genitourinary: Negative for dysuria and hematuria.   Musculoskeletal: Negative for arthralgias and myalgias.   Skin: Negative for pallor.   Neurological: Positive for dizziness and weakness. Negative for numbness.   Hematological: Does not bruise/bleed easily.   Psychiatric/Behavioral: Negative for confusion and suicidal ideas. The patient is not nervous/anxious.      Objective:     Vital Signs (Most Recent):  Temp: 97.7 °F (36.5 °C) (03/07/17 1532)  Pulse: 60 (03/07/17 1532)  Resp: 18 (03/07/17 1532)  BP: 136/72 (03/07/17 1532)  SpO2: 95 % (03/07/17 1830) Vital Signs (24h Range):  Temp:  [97.2 °F (36.2 °C)-97.7 °F (36.5 °C)] 97.7 °F (36.5 °C)  Pulse:  [60-68] 60  Resp:  [18-20] 18  SpO2:  [95 %-99 %] 95 %  BP: ()/(49-78) 136/72     Weight: 85.3 kg (188 lb 0.8 oz)  Body mass index is 31.29 kg/(m^2).    Physical Exam   Constitutional: She is oriented to person, place, and time. She appears well-developed and well-nourished.   HENT:   Head: Normocephalic and atraumatic.   Cardiovascular: Normal rate, regular rhythm and normal heart sounds.    Pulmonary/Chest: Effort normal and breath sounds normal.   Abdominal: Soft. Bowel sounds are normal. There is no tenderness.   Musculoskeletal: She exhibits no edema.   Neurological: She is alert and oriented to person, place, and time.   Skin: Skin is warm and dry.   Psychiatric: She has a normal mood and affect. Her behavior is normal. Judgment and thought content normal.   Nursing note and vitals reviewed.       Significant Labs:   CBC:   Recent Labs  Lab 03/07/17  1042   WBC 3.14*   HGB 13.2   HCT 39.0   *     CMP:   Recent Labs  Lab 03/07/17  1042      K 3.8      CO2 25   *   BUN 21   CREATININE 0.9   CALCIUM 8.8   PROT 5.7*   ALBUMIN 3.3*   BILITOT 1.0   ALKPHOS 82   AST 19   ALT 17   ANIONGAP 9   EGFRNONAA 58*     Troponin:   Recent  Labs  Lab 03/07/17  1042 03/07/17  1644   TROPONINI <0.006 0.008       Significant Imaging: CT: I have reviewed all pertinent results/findings within the past 24 hours and my personal findings are:  reviewed   Postoperative changes of cholecystectomy are noted.  There is extensive pneumobilia with high density material seen within the distal common bile duct as well as within some intrahepatic bile ducts.  This could be related to calcification versus reflux contrast material.  No comparison is available to determine the stability of these findings.    Nonenlarged, calcified left ovarian lesion of uncertain significance.    Diverticulosis coli without diverticulitis.    Calcified coronary artery disease and Calcified atheromatous disease of the aorta and its major branch vessels.      Chest, one view: The lungs are clear.  The heart is normal in size.  No mediastinal, hilar or pleural abnormalities are detected.  The skeletal structures are intact.  Left-sided dual-lead pacemaker.    Assessment/Plan:     * Nausea, vomiting and diarrhea  IVF   zofran       Diabetes mellitus with neuropathy  Insulin sliding scale  Hold metformin for now      Essential hypertension  Hold antihypertensives  Start coreg at low dose      Dehydration  Continue IVF        VTE Risk Mitigation         Ordered     Medium Risk of VTE  Once      03/07/17 1416     Place sequential compression device  Until discontinued      03/07/17 1416        Junior Godinez MD  Department of Hospital Medicine   [facility]

## 2017-03-08 NOTE — PT/OT/SLP DISCHARGE
Physical Therapy Discharge Summary    Erin Sahu  MRN: [id number]   Nausea, vomiting and diarrhea   Patient Discharged from acute Physical Therapy on 3/8/2017.  Please refer to prior PT noted date on 3/8/2017 for functional status.     Assessment:   Patient was discharge unexpectedly.  Information required to complete and accurate discharge summary is unknown.  Refer to therapy initial evaluation and last progress note for initial and most recent functional status and goal achievement.  Recommendations made may be found in medical record. Patient appropriate for care in another setting.  GOALS:   Physical Therapy Goals        Problem: Physical Therapy Goal    Goal Priority Disciplines Outcome Goal Variances Interventions   Physical Therapy Goal     PT/OT, PT      Description:  Goals to be met by: 3/11/2017     Patient will increase functional independence with mobility by performin. Supine to sit with Modified [place]  2. Sit to supine with Modified [place]  3. Rolling to Left and Right with Modified [place].  4. Sit to stand transfer with Supervision using Rolling Walker  5. Bed to chair transfer with Supervision using Rolling Walker  6. Gait  x 75 feet with Supervision using Rolling Walker.               Reasons for Discontinuation of Therapy Services  Transfer to alternate level of care.      Plan:  Patient Discharged to: Home with Home Health Service.

## 2017-03-08 NOTE — PHARMACY MED REC
[facility] Medication Reconciliation  Template    Patient was admitted on 3/7/2017 for Nausea, vomiting and diarrhea.      Patient's prior to admission medication regimen was as follows:  Prescriptions Prior to Admission   Medication Sig Dispense Refill Last Dose    aspirin (ECOTRIN) 81 MG EC tablet Take 1 tablet (81 mg total) by mouth once daily. 1 Bottle 1 3/7/2017    carvedilol (COREG) 25 MG tablet Take 1 tablet (25 mg total) by mouth 2 (two) times daily. 180 tablet 6 3/7/2017    furosemide (LASIX) 20 MG tablet TAKE 1 TABLET ONE TIME every other DAy 90 tablet 1 3/6/2017    gabapentin (NEURONTIN) 300 MG capsule Take 1 capsule (300 mg total) by mouth 3 (three) times daily. 270 capsule 1 3/7/2017    isosorbide mononitrate (IMDUR) 60 MG 24 hr tablet TAKE 1 TABLET ONE TIME DAILY 90 tablet 1 3/7/2017    metformin (GLUCOPHAGE) 500 MG tablet Take 1 tablet (500 mg total) by mouth every evening. 90 tablet 3 3/6/2017 at 1700    omega-3 fatty acids-vitamin E (FISH OIL) 1,000 mg Cap Take 1 capsule by mouth once daily. 90 each 1 3/7/2017    pravastatin (PRAVACHOL) 20 MG tablet Take 1 tablet (20 mg total) by mouth nightly. 90 tablet 1 3/7/2017    vitamin D 185 MG Tab Take 185 mg by mouth once daily.     3/7/2017    [DISCONTINUED] lisinopril (PRINIVIL,ZESTRIL) 40 MG tablet Take 0.5 tablets (20 mg total) by mouth once daily. 90 tablet 1 3/7/2017    diabetic supplies, [place]. [place] TRUE METRIX AIR METER. USE AS DIRECTED TO TEST BLOOD SUGAR TWICE DAILY. 1 each 0 Taking    diabetic supplies, [place]. [place] TRUE METRIX TEST STRIPS. USE AS DIRECTED TO TEST BLOOD SUGAR TWICE DAILY 100 each 12 Taking    diabetic supplies, miscellan. Misc TRUEPLUS SUPER THIN 28G LANCET. USE AS DIRECTED TO TEST BLOOD SUGAR TWICE DAILY. 100 each 12 Taking    lorazepam (ATIVAN) 0.5 MG tablet TAKE 1 TABLET EVERY 8 HOURS AS NEEDED 90 tablet 1     nitroGLYCERIN (NITROSTAT) 0.4 MG SL tablet Place 0.4 mg under the tongue every  5 (five) minutes as needed.     Unknown at Unknown time         Please insert appropriate  SmartPhrase below:      Discharge Medication Reconciliation - Pharmacy Consult Note     The discharge medication regimen was reviewed by Tim Osei, Pharmacist. The following medications have been adjusted/changed:     Patient is to INITIATE the following medications   · No change    Patient is to DISCONTINUE the following medications   · No change    Patient is to HOLD the following medications   · No change       The following medications DOSE or FREQUENCY was CHANGED  Lisinopril 5mg, changed from 20mg daily to 5mg daily    The following issues/concerns were addressed prior to discharge:   Discharge medication counseling   · Discussed change in ACE from 20mg daily to 5mg dailyl  · Let patient know prescription was sent to her mail order pharmacy  · Discussed continuing her medication prior to admission  · Patient wanted to know if she still needed to take her diabetes medication; informed patient to continue her medications she was taking prior to admission and to take medication as prescribed  · Patient did not have any questions about her inpatient medications    Bedside delivery of medications   · Patient will use her mail order pharmacy    Medication affordability   · N/a    Called patients pharmacy to discontinue old lisinopril prescription    PHARMACIST NAME   EXT

## 2017-03-08 NOTE — PT/OT/SLP EVAL
"Physical Therapy  Evaluation    Erin Sahu   MRN: [id number]   Admitting Diagnosis: Nausea, vomiting and diarrhea    PT Received On: 17  PT Start Time: 1007     PT Stop Time: 1037    PT Total Time (min): 30 min       Billable Minutes:  Evaluation 15 minutes and Gait [id number] minutes    Diagnosis: Nausea, vomiting and diarrhea      Past Medical History:   Diagnosis Date    Anxiety     Arthritis     Coronary artery disease     Diabetes mellitus type II     Hyperlipidemia     Hypertension       Past Surgical History:   Procedure Laterality Date    BREAST SURGERY      CARDIAC PACEMAKER PLACEMENT  2009    CHOLECYSTECTOMY      CORONARY ANGIOPLASTY WITH STENT PLACEMENT  2001    EYE SURGERY Bilateral 2001    cataracts    HYSTERECTOMY      TOTAL KNEE ARTHROPLASTY Right        Referring physician: ETHEL Barnes NP  Date referred to PT: 3/8/2017    General Precautions: Standard, fall  Orthopedic Precautions: N/A   Braces: N/A       Do you have any cultural, spiritual, Orthodox conflicts, given your current situation?: none voiced    Patient History:  Lives With: spouse  Living Arrangements: house  Home Accessibility: stairs to enter home  Number of Stairs to Enter Home: 5  Stair Railings at Home: outside, present at both sides  Transportation Available: family or friend will provide  Equipment Currently Used at Home: walker, rolling  DME owned (not currently used): rolling walker    Previous Level of Function:  Ambulation Skills: needs device  Transfer Skills: needs device  ADL Skills: needs device  Work/Leisure Activity: needs device    Subjective:  Communicated with patient prior to session.  "I hope I get to go home today."  Chief Complaint: Mild "weakness."  Patient goals: Increase strength    Pain Ratin/10               Pain Rating Post-Intervention: 0/10    Objective:         Cognitive Exam:  Oriented to: Person, Place, Time and Situation    Follows Commands/attention: Follows multistep "  commands  Communication: clear/fluent  Safety awareness/insight to disability: intact    Physical Exam:  Postural examination/scapula alignment: Rounded shoulder and Head forward    Skin integrity: Visible skin intact  Edema: None noted    Sensation:   Intact    Upper Extremity Range of Motion:  Right Upper Extremity: WFL  Left Upper Extremity: WFL    Upper Extremity Strength:  Right Upper Extremity: WFL  Left Upper Extremity: WFL    Lower Extremity Range of Motion:  Right Lower Extremity: WFL  Left Lower Extremity: WFL    Lower Extremity Strength:  Right Lower Extremity: Deficits: Grossly 4/5  Left Lower Extremity: Deficits: Grossly 4/5     Fine motor coordination:  Intact    Gross motor coordination: WFL    Functional Mobility:  Bed Mobility:  Rolling/Turning to Left: Stand by assistance  Rolling/Turning Right: Stand by assistance  Scooting/Bridging: Stand by Assistance  Supine to Sit: Stand by Assistance  Sit to Supine: Stand by Assistance    Transfers:  Sit <> Stand Assistance: Stand By Assistance  Sit <> Stand Assistive Device: Rolling Walker  Bed <> Chair Technique: Stand Pivot  Bed <> Chair Assistance: Stand By Assistance    Gait:   Gait Distance: 50 feet  Assistance 1: Stand by Assistance  Gait Assistive Device: Rolling walker  Gait Pattern: swing-to gait  Gait Deviation(s): decreased velocity of limb motion, decreased step length  Gait Training: Pt. Amb. 50' with RW, SBA.  VC's given to pt. For step placement and A.D. Placement.      Balance:   Static Sit: GOOD-: Takes MODERATE challenges from all directions but inconsistently  Dynamic Sit: GOOD-: Maintains balance through MODERATE excursions of active trunk movement,     Static Stand: FAIR+: Takes MINIMAL challenges from all directions  Dynamic stand: FAIR+: Needs CLOSE SUPERVISION during gait and is able to right self with minor LOB      AM-PAC 6 CLICK MOBILITY  How much help from another person does this patient currently need?   1 = Unable,  Total/Dependent Assistance  2 = A lot, Maximum/Moderate Assistance  3 = A little, Minimum/Contact Guard/Supervision  4 = None, Modified [place]/Independent    Turning over in bed (including adjusting bedclothes, sheets and blankets)?: 3  Sitting down on and standing up from a chair with arms (e.g., wheelchair, bedside commode, etc.): 3  Moving from lying on back to sitting on the side of the bed?: 3  Moving to and from a bed to a chair (including a wheelchair)?: 3  Need to walk in hospital room?: 3  Climbing 3-5 steps with a railing?: 2  Total Score: 17     AM-PAC Raw Score CMS G-Code Modifier Level of Impairment Assistance   6 % Total / Unable   7 - 9 CM 80 - 100% Maximal Assist   10 - 14 CL 60 - 80% Moderate Assist   15 - 19 CK 40 - 60% Moderate Assist   20 - 22 CJ 20 - 40% Minimal Assist   23 CI 1-20% SBA / CGA   24 CH 0% Independent/ Mod I     Patient left seated EOB with all lines intact, call button in reach and RN notified.    Assessment:   Erin Shau is a 87 y.o. female with a medical diagnosis of Nausea, vomiting and diarrhea and presents with decreased general strength and endurance and mobility deficits.  Pt. Would benefit from PT to increase independence with ADL's.    Factors that may affect patient's status:  [x] Patient's age [] Time since onset of injury/illness/exacerbation  []Prior Level of function       [x] Current level of function [x] Status of current condition []Patient's cognitive status and safety concerns      [] Patient's level of motivation    [] Patient's home situation (environment and family support)  [x] Objective examination findings [] Goals and goal agreement with the patient        [] Rehab potential (prognosis) and probable outcome    [] Expected progression of patient    Criteria:     History:    1-2 Personal Factors and/or co-morbidity - 70399 (see PLOFand Med Hx)  Examination of Body System:  addressing a total of 3 or more elements - 36347  (See Rehab  Impairments/Problem List below)  Clinical Presentation:  Evolving - 08602  Clinical Decision Making (Complexity):  Moderate - 76676      On examination of body system using standardized tests and measures patient presents with 3 or more elements from any of the following: body structures and functions, activity limitations, and/or participation restrictions.  Leading to a clinical presentation that is considered evolving with changing characteristics        Rehab identified problem list/impairments: Rehab identified problem list/impairments: weakness, impaired balance, impaired endurance, impaired functional mobilty, gait instability, decreased lower extremity function    Rehab potential is fair.    Activity tolerance: Fair    Discharge recommendations: Discharge Facility/Level Of Care Needs: home with home health, home health PT     Barriers to discharge: Barriers to Discharge: None    Equipment recommendations: Equipment Needed After Discharge: none     GOALS:   Physical Therapy Goals        Problem: Physical Therapy Goal    Goal Priority Disciplines Outcome Goal Variances Interventions   Physical Therapy Goal     PT/OT, PT      Description:  Goals to be met by: 3/11/2017     Patient will increase functional independence with mobility by performin. Supine to sit with Modified [place]  2. Sit to supine with Modified [place]  3. Rolling to Left and Right with Modified [place].  4. Sit to stand transfer with Supervision using Rolling Walker  5. Bed to chair transfer with Supervision using Rolling Walker  6. Gait  x 75 feet with Supervision using Rolling Walker.                 PLAN:    Patient to be seen  (1-2x/day(M-F); PRN(Sat.,Sun.)) to address the above listed problems via gait training, therapeutic activities, therapeutic exercises  Plan of Care expires:  (Upon D/C from facility)  Plan of Care reviewed with: patient    Functional Assessment Tool Used: AMPAC  Score: 17  Functional  Limitation: Mobility: Walking and moving around  Mobility: Walking and Moving Around Current Status (): CK  Mobility: Walking and Moving Around Goal Status (): TERI Nicolas, PT  03/08/2017

## 2017-03-08 NOTE — ASSESSMENT & PLAN NOTE
Resolved  Regular diet for lunch and if tolerates will be d/c  PT to assess strength  Will need home health at d/c to watch bp

## 2017-03-08 NOTE — PROGRESS NOTES
This note also relates to the following rows which could not be included:  Pulse - Cannot attach notes to unvalidated device data       03/08/17 0334   Vital Signs   BP (!) 188/82   BP Location Left arm   BP Method Manual   Patient Position Lying   Assessments (Pre/Post)   Level of Consciousness (AVPU) alert     BP elevated. MD notified who states to order a 1 time dose of clonidine 0.1 mg since the PRN order is not due yet. Will intervene.

## 2017-03-08 NOTE — SUBJECTIVE & OBJECTIVE
Past Medical History:   Diagnosis Date    Anxiety     Arthritis     Coronary artery disease     Diabetes mellitus type II     Hyperlipidemia     Hypertension        Past Surgical History:   Procedure Laterality Date    BREAST SURGERY      CARDIAC PACEMAKER PLACEMENT  01/23/2009    CHOLECYSTECTOMY      CORONARY ANGIOPLASTY WITH STENT PLACEMENT  03/2001    EYE SURGERY Bilateral 06/2001    cataracts    HYSTERECTOMY      TOTAL KNEE ARTHROPLASTY Right        Review of patient's allergies indicates:   Allergen Reactions    Iodinated contrast media - iv dye Hives       No current facility-administered medications on file prior to encounter.      Current Outpatient Prescriptions on File Prior to Encounter   Medication Sig    aspirin (ECOTRIN) 81 MG EC tablet Take 1 tablet (81 mg total) by mouth once daily.    carvedilol (COREG) 25 MG tablet Take 1 tablet (25 mg total) by mouth 2 (two) times daily.    furosemide (LASIX) 20 MG tablet TAKE 1 TABLET ONE TIME every other DAy    gabapentin (NEURONTIN) 300 MG capsule Take 1 capsule (300 mg total) by mouth 3 (three) times daily.    isosorbide mononitrate (IMDUR) 60 MG 24 hr tablet TAKE 1 TABLET ONE TIME DAILY    lisinopril (PRINIVIL,ZESTRIL) 40 MG tablet Take 0.5 tablets (20 mg total) by mouth once daily.    metformin (GLUCOPHAGE) 500 MG tablet Take 1 tablet (500 mg total) by mouth every evening.    omega-3 fatty acids-vitamin E (FISH OIL) 1,000 mg Cap Take 1 capsule by mouth once daily.    pravastatin (PRAVACHOL) 20 MG tablet Take 1 tablet (20 mg total) by mouth nightly.    vitamin D 185 MG Tab Take 185 mg by mouth once daily.      diabetic supplies, miscellan. Misc [employer] TRUE METRIX AIR METER. USE AS DIRECTED TO TEST BLOOD SUGAR TWICE DAILY.    diabetic supplies, miscellan. Misc [employer] TRUE METRIX TEST STRIPS. USE AS DIRECTED TO TEST BLOOD SUGAR TWICE DAILY    diabetic supplies, [employer]ellan. Misc TRUEPLUS SUPER THIN 28G LANCET. USE AS DIRECTED TO TEST  BLOOD SUGAR TWICE DAILY.    lorazepam (ATIVAN) 0.5 MG tablet TAKE 1 TABLET EVERY 8 HOURS AS NEEDED    nitroGLYCERIN (NITROSTAT) 0.4 MG SL tablet Place 0.4 mg under the tongue every 5 (five) minutes as needed.       Family History     None        Social History Main Topics    Smoking status: Never Smoker    Smokeless tobacco: Never Used    Alcohol use No    Drug use: No    Sexual activity: Not on file     Review of Systems   Constitutional: Negative for chills and fever.   HENT: Negative for congestion, hearing loss, sinus pressure and sore throat.    Eyes: Negative for photophobia.   Respiratory: Negative for cough, choking, chest tightness and wheezing.    Cardiovascular: Negative for chest pain and palpitations.   Gastrointestinal: Positive for diarrhea, nausea and vomiting. Negative for blood in stool.   Genitourinary: Negative for dysuria and hematuria.   Musculoskeletal: Negative for arthralgias and myalgias.   Skin: Negative for pallor.   Neurological: Positive for dizziness and weakness. Negative for numbness.   Hematological: Does not bruise/bleed easily.   Psychiatric/Behavioral: Negative for confusion and suicidal ideas. The patient is not nervous/anxious.      Objective:     Vital Signs (Most Recent):  Temp: 97.7 °F (36.5 °C) (03/07/17 1532)  Pulse: 60 (03/07/17 1532)  Resp: 18 (03/07/17 1532)  BP: 136/72 (03/07/17 1532)  SpO2: 95 % (03/07/17 1830) Vital Signs (24h Range):  Temp:  [97.2 °F (36.2 °C)-97.7 °F (36.5 °C)] 97.7 °F (36.5 °C)  Pulse:  [60-68] 60  Resp:  [18-20] 18  SpO2:  [95 %-99 %] 95 %  BP: ()/(49-78) 136/72     Weight: 85.3 kg (188 lb 0.8 oz)  Body mass index is 31.29 kg/(m^2).    Physical Exam   Constitutional: She is oriented to person, place, and time. She appears well-developed and well-nourished.   HENT:   Head: Normocephalic and atraumatic.   Cardiovascular: Normal rate, regular rhythm and normal heart sounds.    Pulmonary/Chest: Effort normal and breath sounds normal.    Abdominal: Soft. Bowel sounds are normal. There is no tenderness.   Musculoskeletal: She exhibits no edema.   Neurological: She is alert and oriented to person, place, and time.   Skin: Skin is warm and dry.   Psychiatric: She has a normal mood and affect. Her behavior is normal. Judgment and thought content normal.   Nursing note and vitals reviewed.       Significant Labs:   CBC:   Recent Labs  Lab 03/07/17  1042   WBC 3.14*   HGB 13.2   HCT 39.0   *     CMP:   Recent Labs  Lab 03/07/17  1042      K 3.8      CO2 25   *   BUN 21   CREATININE 0.9   CALCIUM 8.8   PROT 5.7*   ALBUMIN 3.3*   BILITOT 1.0   ALKPHOS 82   AST 19   ALT 17   ANIONGAP 9   EGFRNONAA 58*     Troponin:   Recent Labs  Lab 03/07/17  1042 03/07/17  1644   TROPONINI <0.006 0.008       Significant Imaging: CT: I have reviewed all pertinent results/findings within the past 24 hours and my personal findings are:  reviewed   Postoperative changes of cholecystectomy are noted.  There is extensive pneumobilia with high density material seen within the distal common bile duct as well as within some intrahepatic bile ducts.  This could be related to calcification versus reflux contrast material.  No comparison is available to determine the stability of these findings.    Nonenlarged, calcified left ovarian lesion of uncertain significance.    Diverticulosis coli without diverticulitis.    Calcified coronary artery disease and Calcified atheromatous disease of the aorta and its major branch vessels.      Chest, one view: The lungs are clear.  The heart is normal in size.  No mediastinal, hilar or pleural abnormalities are detected.  The skeletal structures are intact.  Left-sided dual-lead pacemaker.

## 2017-03-08 NOTE — ASSESSMENT & PLAN NOTE
bp now elevated, restart coreg at home dose, restart lisinopril (but lower dose per cards as her pressure was running low in her last clinic visits) and imdur, hold fluids, resume lasix for now

## 2017-03-08 NOTE — PROGRESS NOTES
[facility]  Cardiology  Progress Note    Patient Name: Erin Sahu  MRN: [id number]  Admission Date: 3/7/2017  Hospital Length of Stay: 0 days  Code Status: Full Code   Attending Physician: Junior Godinez MD   Primary Care Physician: Junior Godinez MD  Expected Discharge Date:   Principal Problem:Nausea, vomiting and diarrhea    Subjective:     Hospital Course: patient improving from a GI standpoint, no complaints at this time, tolerating PO intake, BP now elevated.     Interval History: 87 year old w/f with HX of mod AS, HHD, DM,  CAD, dyslipidemia, HTN presented to the ED from urgent care after being seen for N/V/D since Saturday and was noted to be hypotensive BP in the 70s at urgent care. Upon arrival to ED SBP of 84. Patient is AAOX3, reports some general malaise related to vomiting and diarrhea. She denies fever, chest pain, SOB, or palpitations. She reports that before her current illness she was having some low BP readings in the 80s at home.     ROS   Constitutional: Negative   Eyes: Negative    Respiratory: Negative    Cardiovascular: Negative   Gastrointestinal: Negative   Genitourinary: Negative   Musculoskeletal: Negative   Skin: Negative .   Neurological: Negative   Objective:     Vital Signs (Most Recent):  Temp: 96.6 °F (35.9 °C) (03/08/17 0705)  Pulse: 62 (03/08/17 0809)  Resp: 18 (03/08/17 0705)  BP: (!) 175/74 (03/08/17 0705)  SpO2: 95 % (03/08/17 0809) Vital Signs (24h Range):  Temp:  [96.1 °F (35.6 °C)-97.7 °F (36.5 °C)] 96.6 °F (35.9 °C)  Pulse:  [59-68] 62  Resp:  [18-20] 18  SpO2:  [94 %-99 %] 95 %  BP: ()/(49-84) 175/74     Weight: 85.3 kg (188 lb 0.8 oz)  Body mass index is 31.29 kg/(m^2).    SpO2: 95 %  O2 Device (Oxygen Therapy): room air      Intake/Output Summary (Last 24 hours) at 03/08/17 0912  Last data filed at 03/08/17 0702   Gross per 24 hour   Intake          1922.08 ml   Output             2350 ml   Net          -427.92 ml        Lines/Drains/Airways     Peripheral Intravenous Line                 Peripheral IV - Single Lumen 03/07/17 1930 Right Forearm less than 1 day                Physical Exam  General appearance: alert, appears stated age and cooperative  Head: Normocephalic, without obvious abnormality, atraumatic  Eyes: conjunctivae/corneas clear. PERRL  Neck: no carotid bruit, no JVD and supple, symmetrical, trachea midline  Lungs: clear to auscultation bilaterally, normal respiratory effort  Chest Wall: no tenderness  Heart: regular rate and rhythm, S1, S2 normal, no murmur, click, rub or gallop  Abdomen: soft, non-tender; bowel sounds normal; no masses,  no organomegaly  Extremities: Extremities normal, atraumatic, no cyanosis, clubbing, or edema  Pulses: Dorsalis Pedis R: 2+ (normal)/L: 2+ (normal)  Skin: Skin color, texture, turgor normal. No rashes or lesions  Neurologic: Normal mood and affect  Alert and oriented X 3  Significant Labs:   ABG: No results for input(s): PH, PCO2, HCO3, POCSATURATED, BE in the last 48 hours., Blood Culture:   Recent Labs  Lab 03/07/17  1042   LABBLOO No Growth to date   , BMP:   Recent Labs  Lab 03/07/17  1042 03/08/17  0444   * 90    141   K 3.8 3.8    108   CO2 25 24   BUN 21 13   CREATININE 0.9 0.8   CALCIUM 8.8 8.5*   , CMP   Recent Labs  Lab 03/07/17  1042 03/08/17  0444    141   K 3.8 3.8    108   CO2 25 24   * 90   BUN 21 13   CREATININE 0.9 0.8   CALCIUM 8.8 8.5*   PROT 5.7* 5.1*   ALBUMIN 3.3* 3.0*   BILITOT 1.0 0.7   ALKPHOS 82 70   AST 19 20   ALT 17 17   ANIONGAP 9 9   ESTGFRAFRICA >60 >60   EGFRNONAA 58* >60   , CBC   Recent Labs  Lab 03/07/17  1042 03/08/17  0444   WBC 3.14* 2.20*   HGB 13.2 12.5   HCT 39.0 36.1*   * 112*   , INR No results for input(s): INR, PROTIME in the last 48 hours., Lipid Panel No results for input(s): CHOL, HDL, LDLCALC, TRIG, CHOLHDL in the last 48 hours.,   Pathology Results  (Last 10 years)                03/08/17 0444 (A) CBC auto differential (Abnormal) Final result    03/07/17 1042 (A) CBC auto differential (Abnormal) Final result    12/31/16 0340 (A) CBC auto differential (Abnormal) Final result    12/30/16 1559 (A) CBC auto differential (Abnormal) Final result    01/14/16 0810 (A) CBC auto differential (Abnormal) Final result    08/24/15 1133 (A) CBC auto differential (Abnormal) Final result    07/16/15 0842 (A) CBC auto differential (Abnormal) Final result    01/15/15 0815 (A) CBC auto differential (Abnormal) Final result    07/10/14 0856 (A) CBC auto differential (Abnormal) Final result    01/10/14 0750 (A) CBC auto differential (Abnormal) Final result    06/24/13 0745  CBC auto differential Final result    Narrative:                                 VALUE         REFERENCE RANGE  UNITS   -------------------------------------------------------------------   WBC                              4.4 L            4.80-10.80  thous/uL     RBC                             4.87               4.00-5.40  mill/uL      HGB                             15.8               12.0-16.0  g/dL         HCT                             46.7              37.0-48.50  %            MCV                               96 H                 82-95  fL           MCH                             32.4 H             27.0-31.0  pg           MCHC                            33.8               32.0-36.0  g/dL         RDW                             12.8             11.50-14.50  %            PLATELET COUNT                   130 L               150-350  10           MPV                              9.3              9.20-12.90  fL           SEGS                              56                   38-73  %            LYM                               30                   21-44  %            MONO                              11 H                0-7.40  %            EO                                 3                  0-4.20  %            BASO                                0                  0-1.90  %            #BEST                               2                      10  X            #LYMPHS                            1                      10  X            #MONOS                             0                      10  X            #EOS                               0                      10  X            #BASO                              0                      10  X            MANUAL DIFF                       NO                                            COMMENTS:  _____________________________________________________             ___________________________________________________________             ___________________________________________________________             ___________________________________________________________       12/21/12 0750  CBC auto differential Final result    Narrative:                                 VALUE         REFERENCE RANGE  UNITS   -------------------------------------------------------------------   WBC                              5.3              4.80-10.80  thous/uL     RBC                             4.76               4.00-5.40  mill/uL      HGB                             15.7               12.0-16.0  g/dL         HCT                             46.5              37.0-48.50  %            MCV                               98 H                 82-95  fL           MCH                             33.0 H             27.0-31.0  pg           MCHC                            33.8               32.0-36.0  g/dL         RDW                             12.3             11.50-14.50  %            PLATELET COUNT                   130 L               150-350  10           MPV                              9.6              9.20-12.90  fL           SEGS                              52                   38-73  %            LYM                               36                   21-44  %            MONO                              10 H                 0-7.40  %            EO                                 2                  0-4.20  %            BASO                               0                  0-1.90  %            #BEST                               3                      10  X            #LYMPHS                            2                      10  X            #MONOS                             1 H                    10  X            #EOS                               0                      10  X            #BASO                              0                      10  X            MANUAL DIFF                       NO                                            COMMENTS:  _____________________________________________________             ___________________________________________________________             ___________________________________________________________             ___________________________________________________________          and Troponin   Recent Labs  Lab 03/07/17  1644 03/07/17  2248 03/08/17  0444   TROPONINI 0.008 0.018 0.010       Significant Imaging: Cardiac Cath: , CT scan: CT ABDOMEN PELVIS WITH CONTRAST: No results found for this visit on 03/07/17. and CT ABDOMEN PELVIS WITHOUT CONTRAST:   Results for orders placed or performed during the hospital encounter of 03/07/17   CT Abdomen Pelvis  Without Contrast    Narrative    Technique: 5 mm CT images of the abdomen and pelvis were obtained without contrast.    Results: There is bibasilar subsegmental atelectasis.  A calcified granuloma is seen in the left lower lobe.  The base of the heart shows leads from a pacer device.  Calcified coronary artery disease is noted.  Calcified atheromatous disease affects the aorta and its major branch vessels.    Postoperative changes of cholecystectomy are identified.  Extensive pneumobilia is noted.  There is high density material seen within the distal CBD which could be related to reflux contrast material versus a distal stone.  There is also  some high density material seen within central bowel the similar differential.  The liver, spleen, and adrenal glands are unremarkable.  The pancreas atrophic with fatty infiltration.  Both kidneys are normal in size, shape and contour.  No nephrolithiasis, ureterolithiasis, hydronephrosis or hydroureter is seen.  The urinary bladder is well-distended and within normal limits.  Calcified lesion is seen in the left adnexa.  The uterus has been removed.    Diverticulosis coli is visualized without diverticulitis.  The appendix is normal.  No free air, free fluid or obstruction is detected.  No pathologically enlarged abdominal or pelvic lymph nodes are seen.    The bones are osteopenic and show age-appropriate degenerative change.    Impression    Postoperative changes of cholecystectomy are noted.  There is extensive pneumobilia with high density material seen within the distal common bile duct as well as within some intrahepatic bile ducts.  This could be related to calcification versus reflux contrast material.  No comparison is available to determine the stability of these findings.    Nonenlarged, calcified left ovarian lesion of uncertain significance.    Diverticulosis coli without diverticulitis.    Calcified coronary artery disease and Calcified atheromatous disease of the aorta and its major branch vessels.        Electronically signed by: Nica Herrera MD  Date:     03/07/17  Time:    12:21    , Echocardiogram: 2D echo with color flow doppler: No results found for this or any previous visit., EKG: , Stress Test:  and X-Ray: CXR: X-Ray Chest 1 View (CXR):   Results for orders placed or performed during the hospital encounter of 03/07/17   X-Ray Chest 1 View    Narrative    Chest, one view: The lungs are clear.  The heart is normal in size.  No mediastinal, hilar or pleural abnormalities are detected.  The skeletal structures are intact.  Left-sided dual-lead pacemaker.    Impression     No acute  process.      Electronically signed by: Nica Herrera MD  Date:     03/07/17  Time:    10:37     and X-Ray Chest PA and Lateral (CXR): No results found for this visit on 03/07/17. and KUB: X-Ray Abdomen AP 1 View (KUB): No results found for this visit on 03/07/17.  Assessment and Plan:     N/V/D-dehydration-      Hypotension due to hypovolemia from D/V     Moderate AS with h/o syncope 12/17 MAURIZIO 1.1  H/o SSS s/p PM     TTE- 2/13/17 EF 65%, Grade 1 diastolic dysfunction, LVH, moderate aortic stenosis mead gradient 9.7, 1+MR, 1+TR, PAP 30     MPI- 9/5/13 no evidence of ischemia ; CAD stable s/p PCI 2006     Carotid US- 1/6/17- no significant disease     PLAN:  Patient sitting up in bed in no distress without supplemental oxygen.  Volume status ok on exam.  Agree with restarting meds as you have done but will stop Imdur for now.  If discharged today needs close outpatient follow-up.  Patient seen and examined and agree with above.    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 I have reviewed and discussed the management in detail as outlined above.

## 2017-03-08 NOTE — SUBJECTIVE & OBJECTIVE
Interval History: doing better, no longer hypotensive, 1 stool over night, No N/V  Review of Systems   Constitutional: Negative for chills and fever.   HENT: Negative for congestion, hearing loss, sinus pressure and sore throat.    Eyes: Negative for photophobia.   Respiratory: Negative for cough, choking, chest tightness and wheezing.    Cardiovascular: Negative for chest pain and palpitations.   Gastrointestinal: Positive for diarrhea, nausea and vomiting. Negative for blood in stool.   Genitourinary: Negative for dysuria and hematuria.   Musculoskeletal: Negative for arthralgias and myalgias.   Skin: Negative for pallor.   Neurological: Positive for dizziness and weakness. Negative for numbness.   Hematological: Does not bruise/bleed easily.   Psychiatric/Behavioral: Negative for confusion and suicidal ideas. The patient is not nervous/anxious.      Objective:     Vital Signs (Most Recent):  Temp: 96.6 °F (35.9 °C) (03/08/17 0705)  Pulse: (!) 59 (03/08/17 0705)  Resp: 18 (03/08/17 0705)  BP: (!) 175/74 (03/08/17 0705)  SpO2: (!) 94 % (03/08/17 0336) Vital Signs (24h Range):  Temp:  [96.1 °F (35.6 °C)-97.7 °F (36.5 °C)] 96.6 °F (35.9 °C)  Pulse:  [59-68] 59  Resp:  [18-20] 18  SpO2:  [94 %-99 %] 94 %  BP: ()/(49-84) 175/74     Weight: 85.3 kg (188 lb 0.8 oz)  Body mass index is 31.29 kg/(m^2).    Physical Exam   Constitutional: She is oriented to person, place, and time. She appears well-developed and well-nourished.   HENT:   Head: Normocephalic and atraumatic.   Cardiovascular: Normal rate, regular rhythm and normal heart sounds.    Pulmonary/Chest: Effort normal and breath sounds normal.   Abdominal: Soft. Bowel sounds are normal. There is no tenderness.   Musculoskeletal: She exhibits no edema.   Neurological: She is alert and oriented to person, place, and time.   Skin: Skin is warm and dry.   Psychiatric: She has a normal mood and affect. Her behavior is normal. Judgment and thought content normal.    Nursing note and vitals reviewed.       Significant Labs:   CBC:     Recent Labs  Lab 03/07/17  1042 03/08/17  0444   WBC 3.14* 2.20*   HGB 13.2 12.5   HCT 39.0 36.1*   * 112*     CMP:     Recent Labs  Lab 03/07/17  1042 03/08/17  0444    141   K 3.8 3.8    108   CO2 25 24   * 90   BUN 21 13   CREATININE 0.9 0.8   CALCIUM 8.8 8.5*   PROT 5.7* 5.1*   ALBUMIN 3.3* 3.0*   BILITOT 1.0 0.7   ALKPHOS 82 70   AST 19 20   ALT 17 17   ANIONGAP 9 9   EGFRNONAA 58* >60     Troponin:     Recent Labs  Lab 03/07/17  1644 03/07/17  2248 03/08/17  0444   TROPONINI 0.008 0.018 0.010   Lactate 1.1  BNP    Recent Labs  Lab 03/07/17  1042   BNP 93     Urinalysis    Recent Labs  Lab 03/07/17  1453   COLORU Yellow   SPECGRAV <=1.005*   PHUR 5.0   PROTEINUA Negative   NITRITE Negative   LEUKOCYTESUR Negative   UROBILINOGEN Negative     Blood cultures NGTD    Significant Imaging:   CT abd and pelvis  Postoperative changes of cholecystectomy are noted.  There is extensive pneumobilia with high density material seen within the distal common bile duct as well as within some intrahepatic bile ducts.  This could be related to calcification versus reflux contrast material.  No comparison is available to determine the stability of these findings.    Nonenlarged, calcified left ovarian lesion of uncertain significance.    Diverticulosis coli without diverticulitis.    Calcified coronary artery disease and Calcified atheromatous disease of the aorta and its major branch vessels.      Chest, one view: The lungs are clear.  The heart is normal in size.  No mediastinal, hilar or pleural abnormalities are detected.  The skeletal structures are intact.  Left-sided dual-lead pacemaker. No acute process    EKG Sinus rhythm with 1st degree A-V block  Left axis deviation  Right bundle branch block  Nonspecific T wave abnormality  Abnormal ECG  When compared with ECG of 30-DEC-2016 15:52,  Sinus rhythm has replaced Electronic  atrial pacemaker  T wave inversion now evident in Inferior leads  Confirmed by REGINA GEORGE MD ([id number]) on 3/7/2017 1:21:09 PM

## 2017-03-09 LAB
ESTIMATED AVG GLUCOSE: 140 MG/DL
HBA1C MFR BLD HPLC: 6.5 %

## 2017-03-12 LAB — BACTERIA BLD CULT: NORMAL

## 2017-03-16 ENCOUNTER — OFFICE VISIT (OUTPATIENT)
Dept: INTERNAL MEDICINE | Facility: CLINIC | Age: 82
End: 2017-03-16
Payer: MEDICARE

## 2017-03-16 VITALS
HEART RATE: 66 BPM | HEIGHT: 65 IN | BODY MASS INDEX: 30.97 KG/M2 | RESPIRATION RATE: 16 BRPM | SYSTOLIC BLOOD PRESSURE: 148 MMHG | WEIGHT: 185.88 LBS | DIASTOLIC BLOOD PRESSURE: 84 MMHG | OXYGEN SATURATION: 98 %

## 2017-03-16 DIAGNOSIS — A08.4 VIRAL GASTROENTERITIS: ICD-10-CM

## 2017-03-16 DIAGNOSIS — E86.0 DEHYDRATION: Primary | ICD-10-CM

## 2017-03-16 PROCEDURE — 1157F ADVNC CARE PLAN IN RCRD: CPT | Mod: S$GLB,,, | Performed by: INTERNAL MEDICINE

## 2017-03-16 PROCEDURE — 99999 PR PBB SHADOW E&M-EST. PATIENT-LVL III: CPT | Mod: PBBFAC,,, | Performed by: INTERNAL MEDICINE

## 2017-03-16 PROCEDURE — 1160F RVW MEDS BY RX/DR IN RCRD: CPT | Mod: S$GLB,,, | Performed by: INTERNAL MEDICINE

## 2017-03-16 PROCEDURE — 1159F MED LIST DOCD IN RCRD: CPT | Mod: S$GLB,,, | Performed by: INTERNAL MEDICINE

## 2017-03-16 PROCEDURE — 99213 OFFICE O/P EST LOW 20 MIN: CPT | Mod: S$GLB,,, | Performed by: INTERNAL MEDICINE

## 2017-03-16 RX ORDER — DIPHENOXYLATE HYDROCHLORIDE AND ATROPINE SULFATE 2.5; .025 MG/1; MG/1
1 TABLET ORAL 4 TIMES DAILY PRN
Qty: 20 TABLET | Refills: 0 | Status: SHIPPED | OUTPATIENT
Start: 2017-03-16 | End: 2017-03-26

## 2017-03-16 NOTE — MR AVS SNAPSHOT
[place] - Internal Medicine  [address] [address]  Phone: [phone]  Fax: [phone]                  Erin Sahu   3/16/2017 3:15 PM   Office Visit    Description:  Female : 1929   Provider:  Junior Godinez MD   Department:  [facility]           Reason for Visit     Follow-up           Diagnoses this Visit        Comments    Dehydration    -  Primary     Viral gastroenteritis                To Do List           Future Appointments        Provider Department Dept Phone    4/10/2017 10:00 AM Junior Godinez MD [facility] [phone]      Goals (5 Years of Data)     None       These Medications        Disp Refills Start End    diphenoxylate-atropine 2.5-0.025 mg (LOMOTIL) 2.5-0.025 mg per tablet 20 tablet 0 3/16/2017 3/26/2017    Take 1 tablet by mouth 4 (four) times daily as needed for Diarrhea. - Oral    Pharmacy: [facility] - [place] [id number] [facility] #: [phone]         [facility] On Call     [facility] On Call Nurse Care Line -  Assistance  Registered nurses in the [facility] On Call Center provide clinical advisement, health education, appointment booking, and other advisory services.  Call for this free service at [phone].             Medications           Message regarding Medications     Verify the changes and/or additions to your medication regime listed below are the same as discussed with your clinician today.  If any of these changes or additions are incorrect, please notify your healthcare provider.        START taking these NEW medications        Refills    diphenoxylate-atropine 2.5-0.025 mg (LOMOTIL) 2.5-0.025 mg per tablet 0    Sig: Take 1 tablet by mouth 4 (four) times daily as needed for Diarrhea.    Class: Print    Route: Oral      STOP taking these medications     isosorbide mononitrate (IMDUR) 60 MG 24 hr tablet TAKE 1 TABLET ONE TIME DAILY           Verify that the  "below list of medications is an accurate representation of the medications you are currently taking.  If none reported, the list may be blank. If incorrect, please contact your healthcare provider. Carry this list with you in case of emergency.           Current Medications     aspirin (ECOTRIN) 81 MG EC tablet Take 1 tablet (81 mg total) by mouth once daily.    carvedilol (COREG) 25 MG tablet Take 1 tablet (25 mg total) by mouth 2 (two) times daily.    diabetic supplies, misc[place]an. Misc [place] [facility] TRUE METRIX AIR METER. USE AS DIRECTED TO TEST BLOOD SUGAR TWICE DAILY.    diabetic supplies, miscellan. Misc HU[employer] TRUE METRIX TEST STRIPS. USE AS DIRECTED TO TEST BLOOD SUGAR TWICE DAILY    diabetic supplies, miscellan. Misc TRUEPLUS SUPER THIN 28G LANCET. USE AS DIRECTED TO TEST BLOOD SUGAR TWICE DAILY.    furosemide (LASIX) 20 MG tablet TAKE 1 TABLET ONE TIME every other DAy    gabapentin (NEURONTIN) 300 MG capsule Take 1 capsule (300 mg total) by mouth 3 (three) times daily.    lisinopril (PRINIVIL,ZESTRIL) 5 MG tablet Take 1 tablet (5 mg total) by mouth once daily.    lorazepam (ATIVAN) 0.5 MG tablet TAKE 1 TABLET EVERY 8 HOURS AS NEEDED    metformin (GLUCOPHAGE) 500 MG tablet Take 1 tablet (500 mg total) by mouth every evening.    nitroGLYCERIN (NITROSTAT) 0.4 MG SL tablet Place 0.4 mg under the tongue every 5 (five) minutes as needed.      omega-3 fatty acids-vitamin E (FISH OIL) 1,000 mg Cap Take 1 capsule by mouth once daily.    pravastatin (PRAVACHOL) 20 MG tablet Take 1 tablet (20 mg total) by mouth nightly.    ranitidine (ZANTAC) 150 MG capsule Take 150 mg by mouth once daily.    vitamin D 185 MG Tab Take 185 mg by mouth once daily.      diphenoxylate-atropine 2.5-0.025 mg (LOMOTIL) 2.5-0.025 mg per tablet Take 1 tablet by mouth 4 (four) times daily as needed for Diarrhea.           Clinical Reference Information           Your Vitals Were     BP Pulse Resp Height Weight SpO2    148/84 66 16 5' 5" (1.651 m) 84.3 " kg (185 lb 13.6 oz) 98%    BMI                30.93 kg/m2          Blood Pressure          Most Recent Value    BP  (!)  148/84      Allergies as of 3/16/2017     Iodinated Contrast Media - Iv Dye      Immunizations Administered on Date of Encounter - 3/16/2017     None      [facility] Sign-Up     Activating your [facility] account is as easy as 1-2-3!     1) Visit [email or web address], select Sign Up Now, enter this activation code and your date of birth, then select Next.  Activation code not generated  Current Patient Portal Status: Account disabled      2) Create a username and password to use when you visit [facility] in the future and select a security question in case you lose your password and select Next.    3) Enter your e-mail address and click Sign Up!    Additional Information  If you have questions, please e-mail [email or web address] or call [phone] to talk to our [facility] staff. Remember, [facility] is NOT to be used for urgent needs. For medical emergencies, dial 911.         Language Assistance Services     ATTENTION: Language assistance services are available, free of charge. Please call [phone].      ATENCIÓN: Si habla español, tiene a la disposición servicios gratuitos de asistencia lingüística. Llame al [phone].     CHÚ Ý: N?u b?n nói Ti?ng Vi?t, có các d?ch v? h? tr? ngôn ng? mi?n phí dành cho b?n. G?i s? [phone].         [facility] complies with applicable Federal civil rights laws and does not discriminate on the basis of race, color, national origin, age, disability, or sex.

## 2017-03-16 NOTE — PROGRESS NOTES
Subjective:       Patient ID: Erin Sahu is a 87 y.o. female.    Chief Complaint: Follow-up (Hospital follow-up)    HPI Comments: 87 y.o. female with Hypotension; Emesis; and Nausea since Sat (4 days)      History of Present Illness  Erin Sahu with DM-2, HTN, hyperlipidemia presented to the emergency room with hypotension and nausea vomiting;   with similar symptoms     Pt was seen at the [facility] urgent care with nausea vomiting and diarrhea   Pt on a systolic blood pressure in the 70s in that clinic, referred to the ER for treatment  Pt has had nausea vomiting and diarrhea since Saturday night with low blood pressure  Patient has had issues with hypotension in the past, BP meds often dropper BP greatly  Patient states this hypotension is worse with the diarrhea, SBP of 84 arrival in the ER      The history is provided by the patient        * No surgery found *       Indwelling Lines/Drains at time of discharge:   Lines/Drains/Airways          No matching active lines, drains, or airways       Hospital Course:   She did well over night. Had one stool. No longer with N/V. BP much better. 84/49 on admission. IVF started on LR 50ml/HR. Now bp 175/74      Consults:   Consults          Status Ordering Provider        Inpatient consult to [facility]   Provider: Vasu Peralta MD    Completed [facility]          Significant Diagnostic Studies:      CBC:      Recent Labs  Lab 03/07/17  1042 03/08/17  0444  WBC 3.14* 2.20*  HGB 13.2 12.5  HCT 39.0 36.1*  * 112*     CMP:      Recent Labs  Lab 03/07/17  1042 03/08/17  0444   141  K 3.8 3.8   108  CO2 25 24  * 90  BUN 21 13  CREATININE 0.9 0.8  CALCIUM 8.8 8.5*  PROT 5.7* 5.1*  ALBUMIN 3.3* 3.0*  BILITOT 1.0 0.7  ALKPHOS 82 70  AST 19 20  ALT 17 17  ANIONGAP 9 9  EGFRNONAA 58* >60     Troponin:      Recent Labs  Lab 03/07/17  1644 03/07/17  2248 03/08/17  0444  TROPONINI 0.008 0.018 0.010  Lactate  1.1  BNP     Recent Labs  Lab 03/07/17  1042  BNP 93     Urinalysis     Recent Labs  Lab 03/07/17  1453  COLORU Yellow  SPECGRAV <=1.005*  PHUR 5.0  PROTEINUA Negative  NITRITE Negative  LEUKOCYTESUR Negative  UROBILINOGEN Negative     Blood cultures NGTD     Significant Imaging:   CT abd and pelvis  Postoperative changes of cholecystectomy are noted.  There is extensive pneumobilia with high density material seen within the distal common bile duct as well as within some intrahepatic bile ducts.  This could be related to calcification versus reflux contrast material.  No comparison is available to determine the stability of these findings.    Nonenlarged, calcified left ovarian lesion of uncertain significance.    Diverticulosis coli without diverticulitis.    Calcified coronary artery disease and Calcified atheromatous disease of the aorta and its major branch vessels.        Chest, one view: The lungs are clear.  The heart is normal in size.  No mediastinal, hilar or pleural abnormalities are detected.  The skeletal structures are intact.  Left-sided dual-lead pacemaker. No acute process     EKG Sinus rhythm with 1st degree A-V block  Left axis deviation  Right bundle branch block  Nonspecific T wave abnormality  Abnormal ECG  When compared with ECG of 30-DEC-2016 15:52,  Sinus rhythm has replaced Electronic atrial pacemaker  T wave inversion now evident in Inferior leads  Confirmed by REGINA GEORGE MD ([id number]) on 3/7/2017 1:21:09 PM        Pending Diagnostic Studies:    Procedure Component Value Units Date/Time    Hemoglobin A1c if not done in past 6 weeks ([id number]) Collected: 03/07/17 1042    Order Status: Sent Lab Status: In process Updated: 03/07/17 1712    Specimen: Blood from Blood         Final Active Diagnoses:    Diagnosis Date Noted POA   PRINCIPAL PROBLEM: Nausea, vomiting and diarrhea (R11.2, R19.7) 03/07/2017 Yes   Dehydration (E86.0) 03/07/2017 Yes   Essential hypertension  (I10) 07/23/2015 Yes   Diabetes mellitus with neuropathy (E11.40) 12/31/2012 Yes   Anxiety disorder (F41.9) 12/31/2012 Yes     Problems Resolved During this Admission:    Diagnosis Date Noted Date Resolved POA     * Nausea, vomiting and diarrhea  Resolved  Regular diet for lunch and if tolerates will be d/c  PT to assess strength  Will need home health at d/c to watch bp          She is doing well.  No more diarrhea.  Feeling stronger             Diabetes mellitus with neuropathy  Insulin sliding scale BS   Hold metformin for now- resume at home      Anxiety disorder  Ativan as needed at home      Essential hypertension  bp now elevated, restart coreg at home dose, restart lisinopril (but lower dose per cards) and imdur, hold fluids, resume lasix for now      Dehydration  Resolved, hold fluids       Discharged Condition: good     Disposition: Home or Self Care      Review of Systems   Constitutional: Negative for chills and fever.   HENT: Negative for congestion, hearing loss, sinus pressure and sore throat.    Eyes: Negative for photophobia.   Respiratory: Negative for cough, choking, chest tightness, shortness of breath and wheezing.    Cardiovascular: Negative for chest pain and palpitations.   Gastrointestinal: Negative for blood in stool, nausea and vomiting.   Endocrine: Negative for polydipsia and polyphagia.   Genitourinary: Negative for dysuria and hematuria.   Musculoskeletal: Positive for gait problem and myalgias. Negative for arthralgias.        Foot pain left   Skin: Negative for pallor.   Neurological: Positive for numbness. Negative for dizziness.        Numbness and tingling in feet; on gabapentin ;wants to see neurology .   Hematological: Does not bruise/bleed easily.   Psychiatric/Behavioral: Negative for confusion and suicidal ideas. The patient is nervous/anxious.        Objective:      Physical Exam   Constitutional: She is oriented to person, place, and time. She appears well-developed  and well-nourished.   HENT:   Head: Normocephalic and atraumatic.   Right Ear: External ear normal.   Left Ear: External ear normal.   Mouth/Throat: Oropharynx is clear and moist.   Eyes: Conjunctivae and EOM are normal. Pupils are equal, round, and reactive to light.   Neck: Normal range of motion. Neck supple. No JVD present. No tracheal deviation present. No thyromegaly present.   Cardiovascular: Normal rate, regular rhythm, normal heart sounds and intact distal pulses.    Pulmonary/Chest: Effort normal and breath sounds normal. No respiratory distress. She has no wheezes. She has no rales. She exhibits no tenderness.   Abdominal: Soft. Bowel sounds are normal. She exhibits no distension and no mass. There is no tenderness. There is no rebound and no guarding.   Musculoskeletal: Normal range of motion. She exhibits edema.   Lymphadenopathy:     She has no cervical adenopathy.   Neurological: She is alert and oriented to person, place, and time. She has normal reflexes. No cranial nerve deficit. She exhibits normal muscle tone. Coordination normal.   Skin: Skin is warm and dry.   Psychiatric: She has a normal mood and affect.   Nursing note and vitals reviewed.      Assessment:       1. Dehydration    2. Viral gastroenteritis        Plan:   Erin was seen today for follow-up.    Diagnoses and all orders for this visit:    Dehydration  Resolved    Viral gastroenteritis  Improved    -     diphenoxylate-atropine 2.5-0.025 mg (LOMOTIL) 2.5-0.025 mg per tablet; Take 1 tablet by mouth 4 (four) times daily as needed for Diarrhea.

## 2017-04-24 ENCOUNTER — TELEPHONE (OUTPATIENT)
Dept: INTERNAL MEDICINE | Facility: CLINIC | Age: 82
End: 2017-04-24

## 2017-04-24 NOTE — TELEPHONE ENCOUNTER
Sera is asking for face-to-face documentation on this patient. She will fax necessary paperwork to clinic

## 2017-04-24 NOTE — TELEPHONE ENCOUNTER
----- Message from Lilly Madera sent at 2017 10:12 AM CDT -----  Contact: Sera /  of the [facility]  Erin Sahu  MRN: [id number]  : 1929  PCP: Junior Godinez  Home Phone      [phone]  Work Phone      Not on file.  Mobile          Not on file.      MESSAGE:   Following up on the peer to peer request that was sent in.    Phone: [phone]

## 2017-05-19 ENCOUNTER — TELEPHONE (OUTPATIENT)
Dept: INTERNAL MEDICINE | Facility: CLINIC | Age: 82
End: 2017-05-19

## 2017-05-19 NOTE — TELEPHONE ENCOUNTER
This was signed per Dr. Godinez on 5/3/17. Faxed to home health today. Attempted to contact Sera, but she was not available.

## 2017-05-19 NOTE — TELEPHONE ENCOUNTER
----- Message from Lilly Madera sent at 2017  3:00 PM CDT -----  Contact: Sera / [facility]  MRN: [id number]  : 1929  PCP: Junior Godinez  Home Phone      [phone]  Work Phone      Not on file.  Mobile          Not on file.      MESSAGE:   Would like to speak to Martha about a face to face with Dr. Godinez. Was sent in on the , but still has not received it back.    Phone: [phone]

## 2017-06-19 ENCOUNTER — OFFICE VISIT (OUTPATIENT)
Dept: INTERNAL MEDICINE | Facility: CLINIC | Age: 82
End: 2017-06-19
Payer: MEDICARE

## 2017-06-19 VITALS
HEART RATE: 63 BPM | WEIGHT: 179.88 LBS | SYSTOLIC BLOOD PRESSURE: 104 MMHG | BODY MASS INDEX: 29.97 KG/M2 | HEIGHT: 65 IN | OXYGEN SATURATION: 95 % | RESPIRATION RATE: 16 BRPM | DIASTOLIC BLOOD PRESSURE: 78 MMHG

## 2017-06-19 DIAGNOSIS — I10 ESSENTIAL HYPERTENSION: ICD-10-CM

## 2017-06-19 DIAGNOSIS — Z79.4 TYPE 2 DIABETES MELLITUS WITH DIABETIC NEUROPATHY, WITH LONG-TERM CURRENT USE OF INSULIN: ICD-10-CM

## 2017-06-19 DIAGNOSIS — E11.40 TYPE 2 DIABETES MELLITUS WITH DIABETIC NEUROPATHY, WITH LONG-TERM CURRENT USE OF INSULIN: ICD-10-CM

## 2017-06-19 DIAGNOSIS — E86.0 DEHYDRATION: Primary | ICD-10-CM

## 2017-06-19 DIAGNOSIS — E78.5 HYPERLIPIDEMIA, UNSPECIFIED HYPERLIPIDEMIA TYPE: ICD-10-CM

## 2017-06-19 DIAGNOSIS — E66.9 NON MORBID OBESITY, UNSPECIFIED OBESITY TYPE: ICD-10-CM

## 2017-06-19 PROCEDURE — 99999 PR PBB SHADOW E&M-EST. PATIENT-LVL III: CPT | Mod: PBBFAC,,, | Performed by: INTERNAL MEDICINE

## 2017-06-19 PROCEDURE — 99214 OFFICE O/P EST MOD 30 MIN: CPT | Mod: S$GLB,,, | Performed by: INTERNAL MEDICINE

## 2017-06-19 PROCEDURE — 1159F MED LIST DOCD IN RCRD: CPT | Mod: S$GLB,,, | Performed by: INTERNAL MEDICINE

## 2017-06-19 PROCEDURE — 1157F ADVNC CARE PLAN IN RCRD: CPT | Mod: S$GLB,,, | Performed by: INTERNAL MEDICINE

## 2017-06-19 RX ORDER — ELECTROLYTES/DEXTROSE
1 SOLUTION, ORAL ORAL DAILY
Status: ON HOLD | COMMUNITY
End: 2020-06-25

## 2017-06-19 RX ORDER — LISINOPRIL 10 MG/1
10 TABLET ORAL DAILY
COMMUNITY
Start: 2017-05-16 | End: 2018-07-02 | Stop reason: ALTCHOICE

## 2017-06-19 NOTE — PROGRESS NOTES
Subjective:       Patient ID: Erin Sahu is a 87 y.o. female.    Chief Complaint: Dehydration (3 month follow up); Diabetes; Hypertension; and Hyperlipidemia    87 y.o. female with follow up. For dehydration .    3 months ago seen ; here reports doing well ; no more hypoglycemia        Diabetes   Hypoglycemia symptoms include nervousness/anxiousness. Pertinent negatives for hypoglycemia include no confusion, dizziness or pallor. Pertinent negatives for diabetes include no chest pain, no polydipsia and no polyphagia.   Hypertension   Pertinent negatives include no chest pain, palpitations or shortness of breath.   Hyperlipidemia   Associated symptoms include myalgias. Pertinent negatives include no chest pain or shortness of breath.     Review of Systems   Constitutional: Negative for chills and fever.   HENT: Negative for congestion, hearing loss, sinus pressure and sore throat.    Eyes: Negative for photophobia.   Respiratory: Negative for cough, choking, chest tightness, shortness of breath and wheezing.    Cardiovascular: Negative for chest pain and palpitations.   Gastrointestinal: Negative for blood in stool, nausea and vomiting.   Endocrine: Negative for polydipsia and polyphagia.   Genitourinary: Negative for dysuria and hematuria.   Musculoskeletal: Positive for gait problem and myalgias. Negative for arthralgias.        Foot pain left   Skin: Negative for pallor.   Neurological: Positive for numbness. Negative for dizziness.        Numbness and tingling in feet; on gabapentin ;wants to see neurology .   Hematological: Does not bruise/bleed easily.   Psychiatric/Behavioral: Negative for confusion and suicidal ideas. The patient is nervous/anxious.        Objective:      Physical Exam   Constitutional: She is oriented to person, place, and time. She appears well-developed and well-nourished.   HENT:   Head: Normocephalic and atraumatic.   Right Ear: External ear normal.   Left Ear: External ear normal.    Mouth/Throat: Oropharynx is clear and moist.   Eyes: Conjunctivae and EOM are normal. Pupils are equal, round, and reactive to light.   Neck: Normal range of motion. Neck supple. No JVD present. No tracheal deviation present. No thyromegaly present.   Cardiovascular: Normal rate, regular rhythm, normal heart sounds and intact distal pulses.    Pulses:       Dorsalis pedis pulses are 2+ on the right side, and 2+ on the left side.        Posterior tibial pulses are 2+ on the right side, and 2+ on the left side.   Pulmonary/Chest: Effort normal and breath sounds normal. No respiratory distress. She has no wheezes. She has no rales. She exhibits no tenderness.   Abdominal: Soft. Bowel sounds are normal. She exhibits no distension and no mass. There is no tenderness. There is no rebound and no guarding.   Musculoskeletal: Normal range of motion. She exhibits edema.   Feet:   Right Foot:   Protective Sensation: 6 sites tested. 6 sites sensed.   Skin Integrity: Negative for ulcer, erythema or dry skin.   Left Foot:   Protective Sensation: 6 sites tested. 6 sites sensed.   Skin Integrity: Negative for ulcer, erythema or dry skin.   Lymphadenopathy:     She has no cervical adenopathy.   Neurological: She is alert and oriented to person, place, and time. She has normal reflexes. No cranial nerve deficit. She exhibits normal muscle tone. Coordination normal.   Skin: Skin is warm and dry.   Psychiatric: She has a normal mood and affect.   Nursing note and vitals reviewed.      Assessment:       1. Dehydration    2. Type 2 diabetes mellitus with diabetic neuropathy, with long-term current use of insulin    3. Essential hypertension    4. Hyperlipidemia, unspecified hyperlipidemia type    5. Non morbid obesity, unspecified obesity type        Plan:   Erin was seen today for dehydration, diabetes, hypertension and hyperlipidemia.    Diagnoses and all orders for this visit:    Dehydration  Resolved   Lasix every other  day    Type 2 diabetes mellitus with diabetic neuropathy, with long-term current use of insulin  -     Hemoglobin A1c; Future  -     Microalbumin/creatinine urine ratio; Future  Patient has       controlled Diabetes .  We discussed about diet ;low in calories. Avoid sweats, sodas.  Also increasing activity;walking 2-3 miles a day.  I also adjusted medications and gave patient  instructions about adherence to plan.  Goal of  A1c  less than 7 % stressed.  Also goal of LDL less than 70 highlighted to patient.    Essential hypertension  -     CBC auto differential; Future  -     Comprehensive metabolic panel; Future    Well controlled.  Continue same medication and dose.  1. Keep weight close to ideal body weight.   2.   Avoid high salt foods (olives, pickles, smoked meats, salted potato chips, etc.).   Do not add salt to your food at the table.   Use only small amounts of salt when cooking.   3. Begin an exercise program. Discuss with your doctor what type of exercise program would be best for you. It doesn't have to be difficult. Even brisk walking for 20 minutes three times a week is a good form of exercise.   4. Avoid medicines which contain heart stimulants. This includes many cold and sinus decongestant pills and sprays as well as diet pills. Check the warnings about hypertension on the label. Stimulants such as amphetamine or cocaine could be lethal for someone with hypertension. Never take these.  '    Hyperlipidemia, unspecified hyperlipidemia type  -     TSH; Future  -     Lipid panel; Future  Limit the cholesterol in your diet to less than 300 mg per day.   Fats should contribute no more than 20 to 35% of your daily calories.   Less than 7 to 10% of your calories should come from saturated fat.   Avoid saturated fat products e.g., Butter, some oils, meat, and poultry fat contain a lot of saturated fat.   Check food labels for fat and cholesterol content. Choose the foods with less fat per serving.   Limit the  amount of butter and margarine you eat.   Use salad dressings and margarine made with polyunsaturated and monounsaturated fats.   Use egg whites or egg substitutes rather than whole eggs.   Replace whole-milk dairy products with nonfat or low-fat milk, cheese, spreads, and yogurt.   Eat skinless chicken, turkey, fish, and meatless entrees more often than red meat.   Choose lean cuts of meat and trim off all visible fat. Keep portion sizes moderate.   Avoid fatty desserts such as ice cream, cream-filled cakes, and cheesecakes. Choose fresh fruits, nonfat frozen yogurt, Popsicles, etc.   Reduce the amount of fried foods, vending machine food, and fast food you eat.   Eat fruits and vegetables (especially fresh fruits and leafy vegetables), beans, and whole grains daily. The fiber in these foods helps lower cholesterol.   Look for low-fat or nonfat varieties of the foods you like to eat, or look for substitutes.   You may need to exercise 60 minutes a day to prevent weight gain and 90 minutes a day to lose weight.  Non morbid obesity, unspecified obesity type    # The patient is asked to make an attempt to improve diet and exercise patterns to aid in medical management of this problem.     # Eat  5 small meals a day.     # Cut out high carbohydrate  foods : bread, rice, pasta, potatoes.     # Exercise/walk 5x/week for at least 30-45  minutes.

## 2017-07-19 RX ORDER — PRAVASTATIN SODIUM 20 MG/1
TABLET ORAL
Qty: 90 TABLET | Refills: 1 | Status: SHIPPED | OUTPATIENT
Start: 2017-07-19 | End: 2017-12-19 | Stop reason: SDUPTHER

## 2017-07-28 DIAGNOSIS — E11.9 TYPE 2 DIABETES MELLITUS WITHOUT COMPLICATION: ICD-10-CM

## 2017-08-04 RX ORDER — CALCIUM CITRATE/VITAMIN D3 200MG-6.25
TABLET ORAL
Qty: 200 STRIP | Refills: 12 | Status: SHIPPED | OUTPATIENT
Start: 2017-08-04 | End: 2020-10-27

## 2017-11-27 RX ORDER — LORAZEPAM 0.5 MG/1
TABLET ORAL
Qty: 90 TABLET | Refills: 1 | Status: SHIPPED | OUTPATIENT
Start: 2017-11-27 | End: 2018-05-01 | Stop reason: SDUPTHER

## 2017-12-06 ENCOUNTER — TELEPHONE (OUTPATIENT)
Dept: INTERNAL MEDICINE | Facility: CLINIC | Age: 82
End: 2017-12-06

## 2017-12-06 NOTE — TELEPHONE ENCOUNTER
----- Message from Huma Do sent at 2017 11:07 AM CST -----  Contact: granddaughter (Debora)  Erin Sahu  MRN: [id number]  : 1929  PCP: Junior Godinez  Home Phone      [phone]  Work Phone      Not on file.  Mobile          Not on file.      MESSAGE:   Granddaughter calling to see if she can get her grandmothers lab orders faxed to [facility] instead of having to bring her to [facility]  Phone [phone]

## 2017-12-18 ENCOUNTER — OFFICE VISIT (OUTPATIENT)
Dept: INTERNAL MEDICINE | Facility: CLINIC | Age: 82
End: 2017-12-18
Payer: MEDICARE

## 2017-12-18 VITALS
HEART RATE: 72 BPM | WEIGHT: 181.19 LBS | RESPIRATION RATE: 16 BRPM | BODY MASS INDEX: 30.19 KG/M2 | HEIGHT: 65 IN | SYSTOLIC BLOOD PRESSURE: 120 MMHG | OXYGEN SATURATION: 96 % | DIASTOLIC BLOOD PRESSURE: 82 MMHG

## 2017-12-18 DIAGNOSIS — I10 ESSENTIAL HYPERTENSION: ICD-10-CM

## 2017-12-18 DIAGNOSIS — E11.9 CONTROLLED TYPE 2 DIABETES MELLITUS WITHOUT COMPLICATION, WITHOUT LONG-TERM CURRENT USE OF INSULIN: ICD-10-CM

## 2017-12-18 DIAGNOSIS — E78.5 HYPERLIPIDEMIA, UNSPECIFIED HYPERLIPIDEMIA TYPE: Primary | ICD-10-CM

## 2017-12-18 DIAGNOSIS — F41.9 ANXIETY DISORDER, UNSPECIFIED TYPE: ICD-10-CM

## 2017-12-18 PROCEDURE — 99214 OFFICE O/P EST MOD 30 MIN: CPT | Mod: S$GLB,,, | Performed by: INTERNAL MEDICINE

## 2017-12-18 PROCEDURE — 99999 PR PBB SHADOW E&M-EST. PATIENT-LVL III: CPT | Mod: PBBFAC,,, | Performed by: INTERNAL MEDICINE

## 2017-12-18 RX ORDER — APIXABAN 2.5 MG/1
2.5 TABLET, FILM COATED ORAL 2 TIMES DAILY
COMMUNITY
Start: 2017-11-15 | End: 2018-07-02 | Stop reason: ALTCHOICE

## 2017-12-18 NOTE — PROGRESS NOTES
Subjective:       Patient ID: Erin Sahu is a 88 y.o. female.    Chief Complaint: Hyperlipidemia (follow up with lab review); Hypertension; Diabetes; Anxiety; and Arthritis    Erin Sahu is a 88 y.o. female who presents for Type II DM, Hypertension, and Hyperlipidemia follow up. Labs were reviewed with patient today.      Hyperlipidemia   This is a chronic problem. The problem is controlled. Recent lipid tests were reviewed and are low. Associated symptoms include myalgias. Pertinent negatives include no chest pain or shortness of breath. Current antihyperlipidemic treatment includes statins. The current treatment provides moderate improvement of lipids.   Hypertension   This is a chronic problem. The problem is controlled. Associated symptoms include anxiety. Pertinent negatives include no chest pain, palpitations or shortness of breath. Risk factors for coronary artery disease include sedentary lifestyle, obesity, dyslipidemia and diabetes mellitus. Past treatments include diuretics. The current treatment provides moderate improvement.   Diabetes   She presents for her follow-up diabetic visit. She has type 2 diabetes mellitus. Hypoglycemia symptoms include nervousness/anxiousness. Pertinent negatives for hypoglycemia include no confusion, dizziness or pallor. Pertinent negatives for diabetes include no chest pain, no polydipsia and no polyphagia. Symptoms are improving. Current diabetic treatment includes insulin injections and oral agent (monotherapy). She is compliant with treatment most of the time. Her breakfast blood glucose range is generally  mg/dl.   Anxiety   Presents for follow-up visit. Symptoms include nervous/anxious behavior. Patient reports no chest pain, confusion, dizziness, nausea, palpitations, shortness of breath or suicidal ideas.       Arthritis   Pertinent negatives include no dysuria or fever.   Medication Refill   Associated symptoms include myalgias and numbness.  Pertinent negatives include no arthralgias, chest pain, chills, congestion, coughing, fever, nausea, sore throat or vomiting.     Review of Systems   Constitutional: Negative for chills and fever.   HENT: Negative for congestion, hearing loss, sinus pressure and sore throat.    Eyes: Negative for photophobia.   Respiratory: Negative for cough, choking, chest tightness, shortness of breath and wheezing.    Cardiovascular: Negative for chest pain and palpitations.   Gastrointestinal: Negative for blood in stool, nausea and vomiting.   Endocrine: Negative for polydipsia and polyphagia.   Genitourinary: Negative for dysuria and hematuria.   Musculoskeletal: Positive for arthritis, gait problem and myalgias. Negative for arthralgias.        Foot pain left   Skin: Negative for pallor.   Neurological: Positive for numbness. Negative for dizziness.        Numbness and tingling in feet; on gabapentin ;wants to see neurology .   Hematological: Does not bruise/bleed easily.   Psychiatric/Behavioral: Negative for confusion and suicidal ideas. The patient is nervous/anxious.        Objective:      Physical Exam   Constitutional: She is oriented to person, place, and time. She appears well-developed and well-nourished.   HENT:   Head: Normocephalic and atraumatic.   Right Ear: External ear normal.   Left Ear: External ear normal.   Mouth/Throat: Oropharynx is clear and moist.   Eyes: Conjunctivae and EOM are normal. Pupils are equal, round, and reactive to light.   Neck: Normal range of motion. Neck supple. No JVD present. No tracheal deviation present. No thyromegaly present.   Cardiovascular: Normal rate, regular rhythm, normal heart sounds and intact distal pulses.    Pulmonary/Chest: Effort normal and breath sounds normal. No respiratory distress. She has no wheezes. She has no rales. She exhibits no tenderness.   Abdominal: Soft. Bowel sounds are normal. She exhibits no distension and no mass. There is no tenderness. There  is no rebound and no guarding.   Musculoskeletal: Normal range of motion. She exhibits edema.   Lymphadenopathy:     She has no cervical adenopathy.   Neurological: She is alert and oriented to person, place, and time. She has normal reflexes. No cranial nerve deficit. She exhibits normal muscle tone. Coordination normal.   Skin: Skin is warm and dry.   Psychiatric: She has a normal mood and affect.   Nursing note and vitals reviewed.      Assessment:       1. Hyperlipidemia, unspecified hyperlipidemia type    2. Essential hypertension    3. Controlled type 2 diabetes mellitus without complication, without long-term current use of insulin    4. Anxiety disorder, unspecified type        Plan:   Erin was seen today for hyperlipidemia, hypertension, diabetes, anxiety and arthritis.    Diagnoses and all orders for this visit:    Hyperlipidemia, unspecified hyperlipidemia type  -     Lipid panel; Future  Limit the cholesterol in your diet to less than 300 mg per day.   Fats should contribute no more than 20 to 35% of your daily calories.   Less than 7 to 10% of your calories should come from saturated fat.   Avoid saturated fat products e.g., Butter, some oils, meat, and poultry fat contain a lot of saturated fat.   Check food labels for fat and cholesterol content. Choose the foods with less fat per serving.   Limit the amount of butter and margarine you eat.   Use salad dressings and margarine made with polyunsaturated and monounsaturated fats.   Use egg whites or egg substitutes rather than whole eggs.   Replace whole-milk dairy products with nonfat or low-fat milk, cheese, spreads, and yogurt.   Eat skinless chicken, turkey, fish, and meatless entrees more often than red meat.   Choose lean cuts of meat and trim off all visible fat. Keep portion sizes moderate.   Avoid fatty desserts such as ice cream, cream-filled cakes, and cheesecakes. Choose fresh fruits, nonfat frozen yogurt, Popsicles, etc.   Reduce the  amount of fried foods, vending machine food, and fast food you eat.   Eat fruits and vegetables (especially fresh fruits and leafy vegetables), beans, and whole grains daily. The fiber in these foods helps lower cholesterol.   Look for low-fat or nonfat varieties of the foods you like to eat, or look for substitutes.   You may need to exercise 60 minutes a day to prevent weight gain and 90 minutes a day to lose weight.  Essential hypertension  -     CBC auto differential; Future  -     Comprehensive metabolic panel; Future    Well controlled.  Continue same medication and dose.  1. Keep weight close to ideal body weight.   2.   Avoid high salt foods (olives, pickles, smoked meats, salted potato chips, etc.).   Do not add salt to your food at the table.   Use only small amounts of salt when cooking.   3. Begin an exercise program. Discuss with your doctor what type of exercise program would be best for you. It doesn't have to be difficult. Even brisk walking for 20 minutes three times a week is a good form of exercise.   4. Avoid medicines which contain heart stimulants. This includes many cold and sinus decongestant pills and sprays as well as diet pills. Check the warnings about hypertension on the label. Stimulants such as amphetamine or cocaine could be lethal for someone with hypertension. Never take these.    Controlled type 2 diabetes mellitus without complication, without long-term current use of insulin  -     Hemoglobin A1c; Future  -     Microalbumin/creatinine urine ratio; Future  Patient has controlled Diabetes .  We discussed about diet ;low in calories. Avoid sweats, sodas.  Also increasing activity;walking 2-3 miles a day.  I also adjusted medications and gave patient  instructions about adherence to plan.  Goal of  A1c  less than 7 % stressed.  Also goal of LDL less than 70 highlighted to patient.  Anxiety disorder, unspecified type  -     TSH; Future  Well controlled  Continue present meds.

## 2017-12-19 DIAGNOSIS — E11.9 CONTROLLED TYPE 2 DIABETES MELLITUS WITHOUT COMPLICATION, WITHOUT LONG-TERM CURRENT USE OF INSULIN: ICD-10-CM

## 2017-12-19 RX ORDER — METFORMIN HYDROCHLORIDE 500 MG/1
TABLET ORAL
Qty: 90 TABLET | Refills: 3 | Status: SHIPPED | OUTPATIENT
Start: 2017-12-19 | End: 2018-07-02 | Stop reason: SDUPTHER

## 2017-12-19 RX ORDER — PRAVASTATIN SODIUM 20 MG/1
TABLET ORAL
Qty: 90 TABLET | Refills: 1 | Status: SHIPPED | OUTPATIENT
Start: 2017-12-19 | End: 2018-07-02 | Stop reason: SDUPTHER

## 2018-05-01 RX ORDER — LORAZEPAM 0.5 MG/1
TABLET ORAL
Qty: 90 TABLET | Refills: 1 | Status: SHIPPED | OUTPATIENT
Start: 2018-05-01 | End: 2018-07-02 | Stop reason: SDUPTHER

## 2018-07-02 ENCOUNTER — OFFICE VISIT (OUTPATIENT)
Dept: INTERNAL MEDICINE | Facility: CLINIC | Age: 83
End: 2018-07-02
Payer: MEDICARE

## 2018-07-02 VITALS
HEART RATE: 66 BPM | SYSTOLIC BLOOD PRESSURE: 136 MMHG | RESPIRATION RATE: 14 BRPM | HEIGHT: 65 IN | WEIGHT: 186.31 LBS | OXYGEN SATURATION: 96 % | BODY MASS INDEX: 31.04 KG/M2 | DIASTOLIC BLOOD PRESSURE: 82 MMHG

## 2018-07-02 DIAGNOSIS — E11.40 TYPE 2 DIABETES MELLITUS WITH DIABETIC NEUROPATHY, WITH LONG-TERM CURRENT USE OF INSULIN: Primary | ICD-10-CM

## 2018-07-02 DIAGNOSIS — Z79.4 TYPE 2 DIABETES MELLITUS WITH DIABETIC NEUROPATHY, WITH LONG-TERM CURRENT USE OF INSULIN: Primary | ICD-10-CM

## 2018-07-02 DIAGNOSIS — I10 ESSENTIAL HYPERTENSION: ICD-10-CM

## 2018-07-02 DIAGNOSIS — E78.5 HYPERLIPIDEMIA, UNSPECIFIED HYPERLIPIDEMIA TYPE: ICD-10-CM

## 2018-07-02 DIAGNOSIS — E11.9 CONTROLLED TYPE 2 DIABETES MELLITUS WITHOUT COMPLICATION, WITHOUT LONG-TERM CURRENT USE OF INSULIN: ICD-10-CM

## 2018-07-02 PROCEDURE — 99214 OFFICE O/P EST MOD 30 MIN: CPT | Mod: S$GLB,,, | Performed by: INTERNAL MEDICINE

## 2018-07-02 PROCEDURE — 99999 PR PBB SHADOW E&M-EST. PATIENT-LVL III: CPT | Mod: PBBFAC,,, | Performed by: INTERNAL MEDICINE

## 2018-07-02 RX ORDER — LORAZEPAM 0.5 MG/1
TABLET ORAL
Qty: 90 TABLET | Refills: 1 | Status: SHIPPED | OUTPATIENT
Start: 2018-07-02 | End: 2018-09-24 | Stop reason: SDUPTHER

## 2018-07-02 RX ORDER — FUROSEMIDE 20 MG/1
TABLET ORAL
Qty: 90 TABLET | Refills: 1 | Status: SHIPPED | OUTPATIENT
Start: 2018-07-02 | End: 2019-04-01 | Stop reason: DRUGHIGH

## 2018-07-02 RX ORDER — CARVEDILOL 25 MG/1
25 TABLET ORAL 2 TIMES DAILY
Qty: 180 TABLET | Refills: 6 | Status: SHIPPED | OUTPATIENT
Start: 2018-07-02 | End: 2019-08-07 | Stop reason: SDUPTHER

## 2018-07-02 RX ORDER — METFORMIN HYDROCHLORIDE 500 MG/1
500 TABLET ORAL NIGHTLY
Qty: 90 TABLET | Refills: 3 | Status: SHIPPED | OUTPATIENT
Start: 2018-07-02 | End: 2019-08-07 | Stop reason: SDUPTHER

## 2018-07-02 RX ORDER — PRAVASTATIN SODIUM 20 MG/1
20 TABLET ORAL NIGHTLY
Qty: 90 TABLET | Refills: 1 | Status: SHIPPED | OUTPATIENT
Start: 2018-07-02 | End: 2018-11-15 | Stop reason: SDUPTHER

## 2018-07-02 RX ORDER — GABAPENTIN 300 MG/1
300 CAPSULE ORAL 3 TIMES DAILY
Qty: 270 CAPSULE | Refills: 1 | Status: SHIPPED | OUTPATIENT
Start: 2018-07-02 | End: 2019-05-21 | Stop reason: SDUPTHER

## 2018-07-02 RX ORDER — VALSARTAN 320 MG/1
320 TABLET ORAL DAILY
COMMUNITY
Start: 2018-06-04 | End: 2019-01-17

## 2018-07-02 NOTE — PROGRESS NOTES
Subjective:       Patient ID: Erin Sahu is a 88 y.o. female.    Chief Complaint: Hyperlipidemia; Hypertension; Arthritis; Anxiety; Diabetes; and Hoarse    Erin Sahu is a 88 y.o. female who presents for Type II DM, Hypertension, and Hyperlipidemia follow up. Labs were reviewed with patient today.    Blood work done [facility] 3/13/18         Hyperlipidemia   This is a chronic problem. The problem is controlled. Recent lipid tests were reviewed and are low. Associated symptoms include myalgias. Pertinent negatives include no chest pain or shortness of breath. Current antihyperlipidemic treatment includes statins. The current treatment provides moderate improvement of lipids.   Hypertension   This is a chronic problem. The problem is controlled. Associated symptoms include anxiety. Pertinent negatives include no chest pain, palpitations or shortness of breath. Risk factors for coronary artery disease include sedentary lifestyle, obesity, dyslipidemia and diabetes mellitus. Past treatments include diuretics. The current treatment provides moderate improvement.   Arthritis   Pertinent negatives include no dysuria or fever.   Anxiety   Presents for follow-up visit. Symptoms include nervous/anxious behavior. Patient reports no chest pain, confusion, dizziness, nausea, palpitations, shortness of breath or suicidal ideas.       Diabetes   She presents for her follow-up diabetic visit. She has type 2 diabetes mellitus. Hypoglycemia symptoms include nervousness/anxiousness. Pertinent negatives for hypoglycemia include no confusion, dizziness or pallor. Pertinent negatives for diabetes include no chest pain, no polydipsia and no polyphagia. Symptoms are improving. Current diabetic treatment includes insulin injections and oral agent (monotherapy). She is compliant with treatment most of the time. Her breakfast blood glucose range is generally  mg/dl.   Medication Refill   Associated symptoms include  myalgias and numbness. Pertinent negatives include no arthralgias, chest pain, chills, congestion, coughing, fever, nausea, sore throat or vomiting.     Review of Systems   Constitutional: Negative for chills and fever.   HENT: Negative for congestion, hearing loss, sinus pressure and sore throat.    Eyes: Negative for photophobia.   Respiratory: Negative for cough, choking, chest tightness, shortness of breath and wheezing.    Cardiovascular: Negative for chest pain and palpitations.   Gastrointestinal: Negative for blood in stool, nausea and vomiting.   Endocrine: Negative for polydipsia and polyphagia.   Genitourinary: Negative for dysuria and hematuria.   Musculoskeletal: Positive for arthritis, gait problem and myalgias. Negative for arthralgias.        Foot pain left   Skin: Negative for pallor.   Neurological: Positive for numbness. Negative for dizziness.        Numbness and tingling in feet; on gabapentin ;wants to see neurology .   Hematological: Does not bruise/bleed easily.   Psychiatric/Behavioral: Negative for confusion and suicidal ideas. The patient is nervous/anxious.        Objective:      Physical Exam   Constitutional: She is oriented to person, place, and time. She appears well-developed and well-nourished.   HENT:   Head: Normocephalic and atraumatic.   Right Ear: External ear normal.   Left Ear: External ear normal.   Mouth/Throat: Oropharynx is clear and moist.   Eyes: Conjunctivae and EOM are normal. Pupils are equal, round, and reactive to light.   Neck: Normal range of motion. Neck supple. No JVD present. No tracheal deviation present. No thyromegaly present.   Cardiovascular: Normal rate, regular rhythm, normal heart sounds and intact distal pulses.    Pulmonary/Chest: Effort normal and breath sounds normal. No respiratory distress. She has no wheezes. She has no rales. She exhibits no tenderness.   Abdominal: Soft. Bowel sounds are normal. She exhibits no distension and no mass. There is  no tenderness. There is no rebound and no guarding.   Musculoskeletal: Normal range of motion. She exhibits edema.   Lymphadenopathy:     She has no cervical adenopathy.   Neurological: She is alert and oriented to person, place, and time. She has normal reflexes. No cranial nerve deficit. She exhibits normal muscle tone. Coordination normal.   Skin: Skin is warm and dry.   Psychiatric: She has a normal mood and affect.   Nursing note and vitals reviewed.      Assessment:       1. Type 2 diabetes mellitus with diabetic neuropathy, with long-term current use of insulin    2. Essential hypertension    3. Controlled type 2 diabetes mellitus without complication, without long-term current use of insulin    4. Hyperlipidemia, unspecified hyperlipidemia type        Plan:   Erin was seen today for hyperlipidemia, hypertension, arthritis, anxiety, diabetes and hoarse.    Diagnoses and all orders for this visit:    Type 2 diabetes mellitus with diabetic neuropathy, with long-term current use of insulin  Patient has controlled Diabetes .  We discussed about diet ;low in calories. Avoid sweats, sodas.  Also increasing activity;walking 2-3 miles a day.  I also adjusted medications and gave patient  instructions about adherence to plan.  Goal of  A1c  less than 7 % stressed.  Also goal of LDL less than 70 highlighted to patient.  Essential hypertension  -     carvedilol (COREG) 25 MG tablet; Take 1 tablet (25 mg total) by mouth 2 (two) times daily.  -     furosemide (LASIX) 20 MG tablet; TAKE 1 TABLET ONE TIME every other DAy    Well controlled.  Continue same medication and dose.  1. Keep weight close to ideal body weight.   2.   Avoid high salt foods (olives, pickles, smoked meats, salted potato chips, etc.).   Do not add salt to your food at the table.   Use only small amounts of salt when cooking.   3. Begin an exercise program. Discuss with your doctor what type of exercise program would be best for you. It doesn't have  to be difficult. Even brisk walking for 20 minutes three times a week is a good form of exercise.   4. Avoid medicines which contain heart stimulants. This includes many cold and sinus decongestant pills and sprays as well as diet pills. Check the warnings about hypertension on the label. Stimulants such as amphetamine or cocaine could be lethal for someone with hypertension. Never take these.    Controlled type 2 diabetes mellitus without complication, without long-term current use of insulin  -     metFORMIN (GLUCOPHAGE) 500 MG tablet; Take 1 tablet (500 mg total) by mouth every evening.  -     DIABETIC SUPPLIES FOR HOME USE    Hyperlipidemia, unspecified hyperlipidemia type  The current medical regimen is effective;  continue present plan and medications.  Other orders  -     gabapentin (NEURONTIN) 300 MG capsule; Take 1 capsule (300 mg total) by mouth 3 (three) times daily.  -     LORazepam (ATIVAN) 0.5 MG tablet; TAKE 1 TABLET EVERY 8 HOURS AS NEEDED  -     pravastatin (PRAVACHOL) 20 MG tablet; Take 1 tablet (20 mg total) by mouth nightly.  -     ranitidine (ZANTAC) 150 MG capsule; Take 1 capsule (150 mg total) by mouth once daily.

## 2018-09-24 RX ORDER — LORAZEPAM 0.5 MG/1
TABLET ORAL
Qty: 90 TABLET | Refills: 1 | Status: SHIPPED | OUTPATIENT
Start: 2018-09-24 | End: 2019-01-17 | Stop reason: SDUPTHER

## 2018-09-24 NOTE — TELEPHONE ENCOUNTER
----- Message from Asha Crow MA sent at 2018 11:19 AM CDT -----  Contact: Gorge Sahu  MRN: [id number]  : 1929  PCP: Junior Godinez  Home Phone      [phone]  Work Phone      Not on file.  Mobile          Not on file.      MESSAGE:   Pt requesting refill or new Rx.   Is this a refill or new RX:  refill  RX name and strength: Lorazepam 0.5mg   Last office visit:   Is this a 30-day or 90-day RX:  90  Pharmacy name and location:  [facility]  Comments:      Phone:  [phone]

## 2018-10-03 ENCOUNTER — TELEPHONE (OUTPATIENT)
Dept: INTERNAL MEDICINE | Facility: CLINIC | Age: 83
End: 2018-10-03

## 2018-10-03 NOTE — TELEPHONE ENCOUNTER
----- Message from Dana Kamara sent at 10/3/2018  8:20 AM CDT -----  Contact: Debora/Granddaughter  Erin Sahu  MRN: [id number]  : 1929  PCP: Junior Godinez  Home Phone      [phone]  Work Phone      Not on file.  Mobile          Not on file.    MESSAGE:   Calling to let nurse know that RX LORazepam (ATIVAN) 0.5 MG tablet is out of stock at [facility], so she would like to know if this can be called in to [facility] in [facility].  Please call.     Pharmacy: [facility] in [place] [facility]    Phone: [phone]

## 2018-11-15 RX ORDER — PRAVASTATIN SODIUM 20 MG/1
TABLET ORAL
Qty: 90 TABLET | Refills: 1 | Status: SHIPPED | OUTPATIENT
Start: 2018-11-15 | End: 2019-08-07 | Stop reason: SDUPTHER

## 2018-12-17 ENCOUNTER — TELEPHONE (OUTPATIENT)
Dept: INTERNAL MEDICINE | Facility: CLINIC | Age: 83
End: 2018-12-17

## 2018-12-17 NOTE — TELEPHONE ENCOUNTER
----- Message from Dana Kamara sent at 2018  1:54 PM CST -----  Contact: Debora/Granddaughter  Erin Sahu  MRN: [id number]  : 1929  PCP: Junior Godinez  Home Phone      [phone]  Work Phone      Not on file.  Mobile          Not on file.    MESSAGE:   Scheduled an appointment for patient to see Dr. Godinez on 18 and she would like to know if her lab work orders can be sent to [facility] prior to her visit so that she can have done.  Please call.    Phone: [phone]

## 2019-01-17 ENCOUNTER — OFFICE VISIT (OUTPATIENT)
Dept: INTERNAL MEDICINE | Facility: CLINIC | Age: 84
End: 2019-01-17
Payer: MEDICARE

## 2019-01-17 VITALS
DIASTOLIC BLOOD PRESSURE: 74 MMHG | HEIGHT: 65 IN | BODY MASS INDEX: 29.2 KG/M2 | HEART RATE: 74 BPM | SYSTOLIC BLOOD PRESSURE: 120 MMHG | RESPIRATION RATE: 14 BRPM | WEIGHT: 175.25 LBS | OXYGEN SATURATION: 100 %

## 2019-01-17 DIAGNOSIS — E78.5 HYPERLIPIDEMIA, UNSPECIFIED HYPERLIPIDEMIA TYPE: Primary | ICD-10-CM

## 2019-01-17 DIAGNOSIS — I10 ESSENTIAL HYPERTENSION: ICD-10-CM

## 2019-01-17 DIAGNOSIS — E11.65 UNCONTROLLED TYPE 2 DIABETES MELLITUS WITH HYPERGLYCEMIA: ICD-10-CM

## 2019-01-17 DIAGNOSIS — I25.10 CORONARY ARTERY DISEASE, ANGINA PRESENCE UNSPECIFIED, UNSPECIFIED VESSEL OR LESION TYPE, UNSPECIFIED WHETHER NATIVE OR TRANSPLANTED HEART: ICD-10-CM

## 2019-01-17 DIAGNOSIS — F41.9 ANXIETY DISORDER, UNSPECIFIED TYPE: ICD-10-CM

## 2019-01-17 DIAGNOSIS — N32.81 OVERACTIVE BLADDER: ICD-10-CM

## 2019-01-17 PROCEDURE — G0009 ADMIN PNEUMOCOCCAL VACCINE: HCPCS | Mod: S$GLB,,, | Performed by: INTERNAL MEDICINE

## 2019-01-17 PROCEDURE — 1101F PR PT FALLS ASSESS DOC 0-1 FALLS W/OUT INJ PAST YR: ICD-10-PCS | Mod: CPTII,S$GLB,, | Performed by: INTERNAL MEDICINE

## 2019-01-17 PROCEDURE — 99999 PR PBB SHADOW E&M-EST. PATIENT-LVL III: ICD-10-PCS | Mod: PBBFAC,,, | Performed by: INTERNAL MEDICINE

## 2019-01-17 PROCEDURE — 99999 PR PBB SHADOW E&M-EST. PATIENT-LVL III: CPT | Mod: PBBFAC,,, | Performed by: INTERNAL MEDICINE

## 2019-01-17 PROCEDURE — 99214 PR OFFICE/OUTPT VISIT, EST, LEVL IV, 30-39 MIN: ICD-10-PCS | Mod: S$GLB,,, | Performed by: INTERNAL MEDICINE

## 2019-01-17 PROCEDURE — G0009 PNEUMOCOCCAL CONJUGATE VACCINE 13-VALENT LESS THAN 5YO & GREATER THAN: ICD-10-PCS | Mod: S$GLB,,, | Performed by: INTERNAL MEDICINE

## 2019-01-17 PROCEDURE — 99214 OFFICE O/P EST MOD 30 MIN: CPT | Mod: S$GLB,,, | Performed by: INTERNAL MEDICINE

## 2019-01-17 PROCEDURE — 90670 PCV13 VACCINE IM: CPT | Mod: S$GLB,,, | Performed by: INTERNAL MEDICINE

## 2019-01-17 PROCEDURE — 90670 PNEUMOCOCCAL CONJUGATE VACCINE 13-VALENT LESS THAN 5YO & GREATER THAN: ICD-10-PCS | Mod: S$GLB,,, | Performed by: INTERNAL MEDICINE

## 2019-01-17 PROCEDURE — 1101F PT FALLS ASSESS-DOCD LE1/YR: CPT | Mod: CPTII,S$GLB,, | Performed by: INTERNAL MEDICINE

## 2019-01-17 RX ORDER — LORAZEPAM 0.5 MG/1
TABLET ORAL
Qty: 90 TABLET | Refills: 1 | Status: SHIPPED | OUTPATIENT
Start: 2019-01-17 | End: 2019-06-26 | Stop reason: SDUPTHER

## 2019-01-17 RX ORDER — IRBESARTAN 300 MG/1
1 TABLET ORAL DAILY
COMMUNITY
Start: 2019-01-14 | End: 2019-08-07 | Stop reason: SDUPTHER

## 2019-01-17 RX ORDER — NITROGLYCERIN 0.4 MG/1
0.4 TABLET SUBLINGUAL EVERY 5 MIN PRN
Qty: 30 TABLET | Refills: 1 | Status: SHIPPED | OUTPATIENT
Start: 2019-01-17 | End: 2019-08-07 | Stop reason: SDUPTHER

## 2019-01-17 RX ORDER — OXYBUTYNIN CHLORIDE 5 MG/1
5 TABLET, EXTENDED RELEASE ORAL DAILY
Qty: 90 TABLET | Refills: 1 | Status: ON HOLD | OUTPATIENT
Start: 2019-01-17 | End: 2019-05-01 | Stop reason: HOSPADM

## 2019-01-17 RX ORDER — AMLODIPINE BESYLATE 2.5 MG/1
1 TABLET ORAL DAILY
COMMUNITY
Start: 2018-11-26 | End: 2019-08-07 | Stop reason: SDUPTHER

## 2019-01-17 RX ORDER — OXYBUTYNIN CHLORIDE 5 MG/1
5 TABLET, EXTENDED RELEASE ORAL DAILY
COMMUNITY
End: 2019-01-17 | Stop reason: SDUPTHER

## 2019-01-17 NOTE — PROGRESS NOTES
Pneumococcal 13 injection given per order left deltoid. No reaction noted. Patient instructed to wait 15 minutes after injection administration. Patient verbalized understanding.

## 2019-01-17 NOTE — PROGRESS NOTES
Subjective:       Patient ID: Erin Sahu is a 89 y.o. female.    Chief Complaint: Fever (ER FOLLOW UP ([facility]) 1/1/2019); Hypertension; Hyperlipidemia; and Diabetes    Erin Sahu is a 89 y.o. female who presents for Type II DM, Hypertension, and Hyperlipidemia follow up. Labs were reviewed with patient today.    Blood work done [facility] 1/17/19        Hyperlipidemia   This is a chronic problem. The problem is controlled. Recent lipid tests were reviewed and are low. Associated symptoms include myalgias. Pertinent negatives include no chest pain or shortness of breath. Current antihyperlipidemic treatment includes statins. The current treatment provides moderate improvement of lipids.   Hypertension   This is a chronic problem. The problem is controlled. Associated symptoms include anxiety. Pertinent negatives include no chest pain, palpitations or shortness of breath. Risk factors for coronary artery disease include sedentary lifestyle, obesity, dyslipidemia and diabetes mellitus. Past treatments include diuretics. The current treatment provides moderate improvement.   Arthritis   Pertinent negatives include no dysuria or fever.   Anxiety   Presents for follow-up visit. Symptoms include nervous/anxious behavior. Patient reports no chest pain, confusion, dizziness, nausea, palpitations, shortness of breath or suicidal ideas.       Diabetes   She presents for her follow-up diabetic visit. She has type 2 diabetes mellitus. Hypoglycemia symptoms include nervousness/anxiousness. Pertinent negatives for hypoglycemia include no confusion, dizziness or pallor. Pertinent negatives for diabetes include no chest pain, no polydipsia and no polyphagia. Symptoms are improving. Current diabetic treatment includes insulin injections and oral agent (monotherapy). She is compliant with treatment most of the time. Her breakfast blood glucose range is generally  mg/dl.   Medication Refill   Associated  symptoms include myalgias and numbness. Pertinent negatives include no arthralgias, chest pain, chills, congestion, coughing, fever, nausea, sore throat or vomiting.     Review of Systems   Constitutional: Negative for chills and fever.   HENT: Negative for congestion, hearing loss, sinus pressure and sore throat.    Eyes: Negative for photophobia.   Respiratory: Negative for cough, choking, chest tightness, shortness of breath and wheezing.    Cardiovascular: Negative for chest pain and palpitations.   Gastrointestinal: Negative for blood in stool, nausea and vomiting.   Endocrine: Negative for polydipsia and polyphagia.   Genitourinary: Negative for dysuria and hematuria.   Musculoskeletal: Positive for arthritis, gait problem and myalgias. Negative for arthralgias.        Foot pain left   Skin: Negative for pallor.   Neurological: Positive for numbness. Negative for dizziness.        Numbness and tingling in feet; on gabapentin ;wants to see neurology .   Hematological: Does not bruise/bleed easily.   Psychiatric/Behavioral: Negative for confusion and suicidal ideas. The patient is nervous/anxious.        Objective:      Physical Exam   Constitutional: She is oriented to person, place, and time. She appears well-developed and well-nourished.   HENT:   Head: Normocephalic and atraumatic.   Right Ear: External ear normal.   Left Ear: External ear normal.   Mouth/Throat: Oropharynx is clear and moist.   Eyes: Conjunctivae and EOM are normal. Pupils are equal, round, and reactive to light.   Neck: Normal range of motion. Neck supple. No JVD present. No tracheal deviation present. No thyromegaly present.   Cardiovascular: Normal rate, regular rhythm, normal heart sounds and intact distal pulses.   Pulmonary/Chest: Effort normal and breath sounds normal. No respiratory distress. She has no wheezes. She has no rales. She exhibits no tenderness.   Abdominal: Soft. Bowel sounds are normal. She exhibits no distension and  no mass. There is no tenderness. There is no rebound and no guarding.   Musculoskeletal: Normal range of motion. She exhibits edema.   Lymphadenopathy:     She has no cervical adenopathy.   Neurological: She is alert and oriented to person, place, and time. She has normal reflexes. No cranial nerve deficit. She exhibits normal muscle tone. Coordination normal.   Skin: Skin is warm and dry.   Psychiatric: She has a normal mood and affect.   Nursing note and vitals reviewed.      Assessment:       1. Hyperlipidemia, unspecified hyperlipidemia type    2. Essential hypertension    3. Uncontrolled type 2 diabetes mellitus with hyperglycemia    4. Overactive bladder    5. Coronary artery disease, angina presence unspecified, unspecified vessel or lesion type, unspecified whether native or transplanted heart    6. Anxiety disorder, unspecified type        Plan:   Erin was seen today for fever, hypertension, hyperlipidemia and diabetes.    Diagnoses and all orders for this visit:    Hyperlipidemia, unspecified hyperlipidemia type  -     Lipid panel; Future  -     TSH; Future  Last ldl 47   Continue meds.    Essential hypertension  -     CBC auto differential; Future  -     Comprehensive metabolic panel; Future  -     TSH; Future    Well controlled.  Continue same medication and dose.  1. Keep weight close to ideal body weight.   2.   Avoid high salt foods (olives, pickles, smoked meats, salted potato chips, etc.).   Do not add salt to your food at the table.   Use only small amounts of salt when cooking.   3. Begin an exercise program. Discuss with your doctor what type of exercise program would be best for you. It doesn't have to be difficult. Even brisk walking for 20 minutes three times a week is a good form of exercise.   4. Avoid medicines which contain heart stimulants. This includes many cold and sinus decongestant pills and sprays as well as diet pills. Check the warnings about hypertension on the label.  Stimulants such as amphetamine or cocaine could be lethal for someone with hypertension. Never take these.    Uncontrolled type 2 diabetes mellitus with hyperglycemia  -     Hemoglobin A1c; Future  -     Microalbumin/creatinine urine ratio; Future  Patient has uncontrolled Diabetes .  We discussed about diet ;low in calories. Avoid sweats, sodas.  Also increasing activity;walking 2-3 miles a day.  I also adjusted medications and gave patient  instructions about adherence to plan.  Goal of  A1c  less than 7 % stressed.  Also goal of LDL less than 70 highlighted to patient.      Overactive bladder  -     oxybutynin (DITROPAN-XL) 5 MG TR24; Take 1 tablet (5 mg total) by mouth once daily.    Coronary artery disease, angina presence unspecified, unspecified vessel or lesion type, unspecified whether native or transplanted heart  -     nitroGLYCERIN (NITROSTAT) 0.4 MG SL tablet; Place 1 tablet (0.4 mg total) under the tongue every 5 (five) minutes as needed.  -     Lipid panel; Future  -     TSH; Future  Continue ASA     Other orders  -     (In Office Administered) Pneumococcal Conjugate Vaccine (13 Valent) (IM)  Erin was seen today for fever, hypertension, hyperlipidemia and diabetes.    Diagnoses and all orders for this visit:    Hyperlipidemia, unspecified hyperlipidemia type  -     Lipid panel; Future  -     TSH; Future    Essential hypertension  -     CBC auto differential; Future  -     Comprehensive metabolic panel; Future  -     TSH; Future    Uncontrolled type 2 diabetes mellitus with hyperglycemia  -     Hemoglobin A1c; Future  -     Microalbumin/creatinine urine ratio; Future    Overactive bladder  -     oxybutynin (DITROPAN-XL) 5 MG TR24; Take 1 tablet (5 mg total) by mouth once daily.    Coronary artery disease, angina presence unspecified, unspecified vessel or lesion type, unspecified whether native or transplanted heart  -     nitroGLYCERIN (NITROSTAT) 0.4 MG SL tablet; Place 1 tablet (0.4 mg total)  under the tongue every 5 (five) minutes as needed.  -     Lipid panel; Future  -     TSH; Future    Anxiety disorder, unspecified type  -     LORazepam (ATIVAN) 0.5 MG tablet; TAKE 1 TABLET EVERY 8 HOURS AS NEEDED    Other orders  -     (In Office Administered) Pneumococcal Conjugate Vaccine (13 Valent) (IM)        Problem List Items Addressed This Visit     Hyperlipidemia - Primary    Essential hypertension      Other Visit Diagnoses     Uncontrolled type 2 diabetes mellitus with hyperglycemia        Overactive bladder        Relevant Medications    oxybutynin (DITROPAN-XL) 5 MG TR24    Coronary artery disease, angina presence unspecified, unspecified vessel or lesion type, unspecified whether native or transplanted heart        Relevant Medications    nitroGLYCERIN (NITROSTAT) 0.4 MG SL tablet

## 2019-01-25 NOTE — TELEPHONE ENCOUNTER
Requested Prescriptions     Pending Prescriptions Disp Refills    alcohol swabs PadM 100 each 6     Sig: Apply 1 each topically once daily.

## 2019-01-28 RX ORDER — ISOPROPYL ALCOHOL 70 ML/100ML
1 SWAB TOPICAL DAILY
Qty: 100 EACH | Refills: 6 | Status: SHIPPED | OUTPATIENT
Start: 2019-01-28

## 2019-03-01 LAB
LEFT EYE DM RETINOPATHY: POSITIVE
RIGHT EYE DM RETINOPATHY: POSITIVE

## 2019-04-29 ENCOUNTER — LAB VISIT (OUTPATIENT)
Dept: LAB | Facility: HOSPITAL | Age: 84
End: 2019-04-29
Attending: INTERNAL MEDICINE
Payer: MEDICARE

## 2019-04-29 ENCOUNTER — OFFICE VISIT (OUTPATIENT)
Dept: INTERNAL MEDICINE | Facility: CLINIC | Age: 84
End: 2019-04-29
Payer: MEDICARE

## 2019-04-29 VITALS
HEIGHT: 65 IN | BODY MASS INDEX: 30.42 KG/M2 | HEART RATE: 65 BPM | OXYGEN SATURATION: 98 % | DIASTOLIC BLOOD PRESSURE: 80 MMHG | RESPIRATION RATE: 15 BRPM | SYSTOLIC BLOOD PRESSURE: 134 MMHG | WEIGHT: 182.56 LBS

## 2019-04-29 DIAGNOSIS — N30.00 ACUTE CYSTITIS WITHOUT HEMATURIA: Primary | ICD-10-CM

## 2019-04-29 DIAGNOSIS — N30.00 ACUTE CYSTITIS WITHOUT HEMATURIA: ICD-10-CM

## 2019-04-29 LAB
BILIRUB UR QL STRIP: NEGATIVE
CLARITY UR: CLEAR
COLOR UR: YELLOW
GLUCOSE UR QL STRIP: NEGATIVE
HGB UR QL STRIP: ABNORMAL
KETONES UR QL STRIP: NEGATIVE
LEUKOCYTE ESTERASE UR QL STRIP: NEGATIVE
NITRITE UR QL STRIP: NEGATIVE
PH UR STRIP: 6 [PH] (ref 5–8)
PROT UR QL STRIP: NEGATIVE
SP GR UR STRIP: 1.01 (ref 1–1.03)
URN SPEC COLLECT METH UR: ABNORMAL
UROBILINOGEN UR STRIP-ACNC: NEGATIVE EU/DL

## 2019-04-29 PROCEDURE — 99999 PR PBB SHADOW E&M-EST. PATIENT-LVL III: CPT | Mod: PBBFAC,,, | Performed by: INTERNAL MEDICINE

## 2019-04-29 PROCEDURE — 1101F PR PT FALLS ASSESS DOC 0-1 FALLS W/OUT INJ PAST YR: ICD-10-PCS | Mod: CPTII,S$GLB,, | Performed by: INTERNAL MEDICINE

## 2019-04-29 PROCEDURE — 99213 PR OFFICE/OUTPT VISIT, EST, LEVL III, 20-29 MIN: ICD-10-PCS | Mod: S$GLB,,, | Performed by: INTERNAL MEDICINE

## 2019-04-29 PROCEDURE — 1101F PT FALLS ASSESS-DOCD LE1/YR: CPT | Mod: CPTII,S$GLB,, | Performed by: INTERNAL MEDICINE

## 2019-04-29 PROCEDURE — 99999 PR PBB SHADOW E&M-EST. PATIENT-LVL III: ICD-10-PCS | Mod: PBBFAC,,, | Performed by: INTERNAL MEDICINE

## 2019-04-29 PROCEDURE — 81003 URINALYSIS AUTO W/O SCOPE: CPT

## 2019-04-29 PROCEDURE — 99213 OFFICE O/P EST LOW 20 MIN: CPT | Mod: S$GLB,,, | Performed by: INTERNAL MEDICINE

## 2019-04-29 RX ORDER — CIPROFLOXACIN 500 MG/1
500 TABLET ORAL 2 TIMES DAILY
Refills: 0 | Status: ON HOLD | COMMUNITY
Start: 2019-04-23 | End: 2019-05-01 | Stop reason: HOSPADM

## 2019-04-29 NOTE — PROGRESS NOTES
Subjective:       Patient ID: Erin Sahu is a 89 y.o. female.    Chief Complaint: Urinary Tract Infection (ER FOLLOW UP (LOS))    Erin Sahu is a 89 y.o. female  Here with follow up for recent UTI .seen at LOS   She took ciprofloxacin for  7 days .  She will finish tonight ; no UTI symptoms.    She is due for battery change for her defibrilator         Review of Systems   Constitutional: Negative for chills and fever.   HENT: Negative for congestion, hearing loss, sinus pressure and sore throat.    Eyes: Negative for photophobia.   Respiratory: Negative for cough, choking, chest tightness, shortness of breath and wheezing.    Cardiovascular: Negative for chest pain and palpitations.   Gastrointestinal: Negative for blood in stool, nausea and vomiting.   Endocrine: Negative for polydipsia and polyphagia.   Genitourinary: Negative for dysuria and hematuria.   Musculoskeletal: Positive for gait problem and myalgias. Negative for arthralgias.        Foot pain left   Skin: Negative for pallor.   Neurological: Positive for numbness. Negative for dizziness.        Numbness and tingling in feet; on gabapentin ;wants to see neurology .   Hematological: Does not bruise/bleed easily.   Psychiatric/Behavioral: Negative for confusion and suicidal ideas. The patient is nervous/anxious.        Objective:      Physical Exam   Constitutional: She is oriented to person, place, and time. She appears well-developed and well-nourished.   HENT:   Head: Normocephalic and atraumatic.   Right Ear: External ear normal.   Left Ear: External ear normal.   Mouth/Throat: Oropharynx is clear and moist.   Eyes: Pupils are equal, round, and reactive to light. Conjunctivae and EOM are normal.   Neck: Normal range of motion. Neck supple. No JVD present. No tracheal deviation present. No thyromegaly present.   Cardiovascular: Normal rate, regular rhythm, normal heart sounds and intact distal pulses.   Pulmonary/Chest: Effort normal and breath  sounds normal. No respiratory distress. She has no wheezes. She has no rales. She exhibits no tenderness.   Abdominal: Soft. Bowel sounds are normal. She exhibits no distension and no mass. There is no tenderness. There is no rebound and no guarding.   Musculoskeletal: Normal range of motion. She exhibits edema.   Lymphadenopathy:     She has no cervical adenopathy.   Neurological: She is alert and oriented to person, place, and time. She has normal reflexes. No cranial nerve deficit. She exhibits normal muscle tone. Coordination normal.   Skin: Skin is warm and dry.   Psychiatric: She has a normal mood and affect.   Nursing note and vitals reviewed.      Assessment:       1. Acute cystitis without hematuria        Plan:   Erin was seen today for urinary tract infection.    Diagnoses and all orders for this visit:    Acute cystitis without hematuria  -     Urinalysis; Future    finish today with 7 day course of antibiotics  Check UA to make sure she is clear of infection for defib battery change     Problem List Items Addressed This Visit     None

## 2019-04-30 ENCOUNTER — TELEPHONE (OUTPATIENT)
Dept: INTERNAL MEDICINE | Facility: CLINIC | Age: 84
End: 2019-04-30

## 2019-04-30 NOTE — TELEPHONE ENCOUNTER
----- Message from Dana Kamara sent at 2019 10:20 AM CDT -----  Contact: Debora/Granddaughter  Erin Sahu  MRN: [id number]  : 1929  PCP: Junior Godinez  Home Phone      [phone]  Work Phone      Not on file.  Mobile          Not on file.      MESSAGE:   Calling to get results of U/A from yesterday.  Patient is scheduled for a procedure tomorrow and Dr. Godinez told her to call office to get him to read early. Please call.    Phone: [phone]

## 2019-04-30 NOTE — TELEPHONE ENCOUNTER
----- Message from Lidia Melvi sent at 2019  1:08 PM CDT -----  Contact: Kimberly (grand daughter)  Erin Sahu  MRN: [id number]  : 1929  PCP: Junior Godinez  Home Phone      [phone]  Work Phone      Not on file.  Mobile          Not on file.    MESSAGE:   She stated Dr. Godinez saw pt on 19. He instructed family to call for UA and urine culture results.     Phone # [phone]    [facility] [address] [place] [id number] [facility] [address]

## 2019-05-01 PROBLEM — I48.0 PAF (PAROXYSMAL ATRIAL FIBRILLATION): Status: ACTIVE | Noted: 2019-05-01

## 2019-05-01 PROBLEM — Z45.010 PACEMAKER AT END OF BATTERY LIFE: Status: ACTIVE | Noted: 2019-05-01

## 2019-05-01 PROBLEM — I49.5 SSS (SICK SINUS SYNDROME): Status: ACTIVE | Noted: 2019-05-01

## 2019-05-21 RX ORDER — GABAPENTIN 300 MG/1
CAPSULE ORAL
Qty: 270 CAPSULE | Refills: 1 | Status: SHIPPED | OUTPATIENT
Start: 2019-05-21 | End: 2019-08-07 | Stop reason: SDUPTHER

## 2019-06-26 DIAGNOSIS — F41.9 ANXIETY DISORDER, UNSPECIFIED TYPE: ICD-10-CM

## 2019-06-26 RX ORDER — LORAZEPAM 0.5 MG/1
TABLET ORAL
Qty: 90 TABLET | Refills: 1 | Status: SHIPPED | OUTPATIENT
Start: 2019-06-26 | End: 2019-08-07 | Stop reason: SDUPTHER

## 2019-06-26 NOTE — TELEPHONE ENCOUNTER
Requested Prescriptions     Pending Prescriptions Disp Refills    LORazepam (ATIVAN) 0.5 MG tablet 90 tablet 1     Sig: TAKE 1 TABLET EVERY 8 HOURS AS NEEDED   LOV: 4/29/19. Key

## 2019-06-26 NOTE — TELEPHONE ENCOUNTER
----- Message from Dana Kamara sent at 2019 10:12 AM CDT -----  Contact: Self  Erin Sahu  MRN: [id number]  : 1929  PCP: Junior Godinez  Home Phone      [phone]  Work Phone      Not on file.  Mobile          Not on file.      MESSAGE:   Needs RX  LORazepam (ATIVAN) 0.5 MG tablet    Pharmacy: [facility] - [place] [address] [address]    Phone: [phone]

## 2019-07-03 ENCOUNTER — PATIENT OUTREACH (OUTPATIENT)
Dept: ADMINISTRATIVE | Facility: HOSPITAL | Age: 84
End: 2019-07-03

## 2019-07-03 NOTE — PROGRESS NOTES
Pre visit chart review.  Spoke with patient and she has her last eye exam records in hand to bring to appointment.  Please send recors to scan in Health Maintenance.

## 2019-08-07 ENCOUNTER — OFFICE VISIT (OUTPATIENT)
Dept: INTERNAL MEDICINE | Facility: CLINIC | Age: 84
End: 2019-08-07
Payer: MEDICARE

## 2019-08-07 VITALS
RESPIRATION RATE: 18 BRPM | HEIGHT: 65 IN | BODY MASS INDEX: 30.67 KG/M2 | SYSTOLIC BLOOD PRESSURE: 118 MMHG | HEART RATE: 76 BPM | WEIGHT: 184.06 LBS | DIASTOLIC BLOOD PRESSURE: 80 MMHG

## 2019-08-07 DIAGNOSIS — E78.5 HYPERLIPIDEMIA, UNSPECIFIED HYPERLIPIDEMIA TYPE: Primary | ICD-10-CM

## 2019-08-07 DIAGNOSIS — E11.9 DIABETES MELLITUS WITHOUT COMPLICATION: ICD-10-CM

## 2019-08-07 DIAGNOSIS — K21.9 GASTROESOPHAGEAL REFLUX DISEASE, ESOPHAGITIS PRESENCE NOT SPECIFIED: ICD-10-CM

## 2019-08-07 DIAGNOSIS — I25.10 CORONARY ARTERY DISEASE, ANGINA PRESENCE UNSPECIFIED, UNSPECIFIED VESSEL OR LESION TYPE, UNSPECIFIED WHETHER NATIVE OR TRANSPLANTED HEART: ICD-10-CM

## 2019-08-07 DIAGNOSIS — I10 ESSENTIAL HYPERTENSION: ICD-10-CM

## 2019-08-07 DIAGNOSIS — F41.9 ANXIETY DISORDER, UNSPECIFIED TYPE: ICD-10-CM

## 2019-08-07 DIAGNOSIS — E11.9 CONTROLLED TYPE 2 DIABETES MELLITUS WITHOUT COMPLICATION, WITHOUT LONG-TERM CURRENT USE OF INSULIN: ICD-10-CM

## 2019-08-07 PROCEDURE — 99214 OFFICE O/P EST MOD 30 MIN: CPT | Mod: S$GLB,,, | Performed by: INTERNAL MEDICINE

## 2019-08-07 PROCEDURE — 99214 PR OFFICE/OUTPT VISIT, EST, LEVL IV, 30-39 MIN: ICD-10-PCS | Mod: S$GLB,,, | Performed by: INTERNAL MEDICINE

## 2019-08-07 PROCEDURE — 1101F PT FALLS ASSESS-DOCD LE1/YR: CPT | Mod: CPTII,S$GLB,, | Performed by: INTERNAL MEDICINE

## 2019-08-07 PROCEDURE — 99999 PR PBB SHADOW E&M-EST. PATIENT-LVL III: CPT | Mod: PBBFAC,,, | Performed by: INTERNAL MEDICINE

## 2019-08-07 PROCEDURE — 99999 PR PBB SHADOW E&M-EST. PATIENT-LVL III: ICD-10-PCS | Mod: PBBFAC,,, | Performed by: INTERNAL MEDICINE

## 2019-08-07 PROCEDURE — 1101F PR PT FALLS ASSESS DOC 0-1 FALLS W/OUT INJ PAST YR: ICD-10-PCS | Mod: CPTII,S$GLB,, | Performed by: INTERNAL MEDICINE

## 2019-08-07 RX ORDER — GABAPENTIN 300 MG/1
300 CAPSULE ORAL 3 TIMES DAILY
Qty: 270 CAPSULE | Refills: 1 | Status: SHIPPED | OUTPATIENT
Start: 2019-08-07 | End: 2020-03-24 | Stop reason: SDUPTHER

## 2019-08-07 RX ORDER — NITROGLYCERIN 0.4 MG/1
0.4 TABLET SUBLINGUAL EVERY 5 MIN PRN
Qty: 30 TABLET | Refills: 1 | Status: SHIPPED | OUTPATIENT
Start: 2019-08-07

## 2019-08-07 RX ORDER — LORAZEPAM 0.5 MG/1
TABLET ORAL
Qty: 90 TABLET | Refills: 1 | Status: SHIPPED | OUTPATIENT
Start: 2019-08-07 | End: 2020-03-23 | Stop reason: SDUPTHER

## 2019-08-07 RX ORDER — AMLODIPINE BESYLATE 2.5 MG/1
2.5 TABLET ORAL DAILY
Qty: 90 TABLET | Refills: 1 | Status: ON HOLD | OUTPATIENT
Start: 2019-08-07 | End: 2020-06-29 | Stop reason: SDUPTHER

## 2019-08-07 RX ORDER — FUROSEMIDE 20 MG/1
20 TABLET ORAL DAILY
Qty: 90 TABLET | Refills: 1 | Status: SHIPPED | OUTPATIENT
Start: 2019-08-07 | End: 2020-03-24 | Stop reason: SDUPTHER

## 2019-08-07 RX ORDER — IRBESARTAN 300 MG/1
300 TABLET ORAL DAILY
Qty: 90 TABLET | Refills: 1 | Status: ON HOLD | OUTPATIENT
Start: 2019-08-07 | End: 2020-06-25

## 2019-08-07 RX ORDER — PRAVASTATIN SODIUM 20 MG/1
20 TABLET ORAL NIGHTLY
Qty: 90 TABLET | Refills: 1 | Status: SHIPPED | OUTPATIENT
Start: 2019-08-07

## 2019-08-07 RX ORDER — CARVEDILOL 25 MG/1
25 TABLET ORAL 2 TIMES DAILY
Qty: 180 TABLET | Refills: 6 | Status: ON HOLD | OUTPATIENT
Start: 2019-08-07 | End: 2020-06-25

## 2019-08-07 RX ORDER — METFORMIN HYDROCHLORIDE 500 MG/1
500 TABLET ORAL NIGHTLY
Qty: 90 TABLET | Refills: 3 | Status: SHIPPED | OUTPATIENT
Start: 2019-08-07

## 2019-08-07 NOTE — PROGRESS NOTES
Subjective:       Patient ID: Erin Sahu is a 89 y.o. female.    Chief Complaint: Follow-up; Hyperlipidemia; Hypertension; Anxiety; and Diabetes    Erin Sahu is a 89 y.o. female who presents for Type II DM, Hypertension, and Hyperlipidemia follow up. Labs were reviewed with patient today.    Blood work done [facility] ; 8/5/19          Hyperlipidemia   This is a chronic problem. The problem is controlled. Recent lipid tests were reviewed and are low. Associated symptoms include myalgias. Pertinent negatives include no chest pain or shortness of breath. Current antihyperlipidemic treatment includes statins. The current treatment provides moderate improvement of lipids.   Hypertension   This is a chronic problem. The problem is controlled. Associated symptoms include anxiety. Pertinent negatives include no chest pain, palpitations or shortness of breath. Risk factors for coronary artery disease include sedentary lifestyle, obesity, dyslipidemia and diabetes mellitus. Past treatments include diuretics. The current treatment provides moderate improvement.   Arthritis   Pertinent negatives include no dysuria or fever.   Anxiety   Presents for follow-up visit. Symptoms include nervous/anxious behavior. Patient reports no chest pain, confusion, dizziness, nausea, palpitations, shortness of breath or suicidal ideas.       Diabetes   She presents for her follow-up diabetic visit. She has type 2 diabetes mellitus. Hypoglycemia symptoms include nervousness/anxiousness. Pertinent negatives for hypoglycemia include no confusion, dizziness or pallor. Pertinent negatives for diabetes include no chest pain, no polydipsia and no polyphagia. Symptoms are improving. Current diabetic treatment includes insulin injections and oral agent (monotherapy). She is compliant with treatment most of the time. Her breakfast blood glucose range is generally  mg/dl.   Medication Refill   Associated symptoms include myalgias  and numbness. Pertinent negatives include no arthralgias, chest pain, chills, congestion, coughing, fever, nausea, sore throat or vomiting.     Review of Systems   Constitutional: Negative for chills and fever.   HENT: Negative for congestion, hearing loss, sinus pressure and sore throat.    Eyes: Negative for photophobia.   Respiratory: Negative for cough, choking, chest tightness, shortness of breath and wheezing.    Cardiovascular: Negative for chest pain and palpitations.   Gastrointestinal: Negative for blood in stool, nausea and vomiting.   Endocrine: Negative for polydipsia and polyphagia.   Genitourinary: Negative for dysuria and hematuria.   Musculoskeletal: Positive for arthritis, gait problem and myalgias. Negative for arthralgias.        Foot pain left   Skin: Negative for pallor.   Neurological: Positive for numbness. Negative for dizziness.        Numbness and tingling in feet; on gabapentin ;wants to see neurology .   Hematological: Does not bruise/bleed easily.   Psychiatric/Behavioral: Negative for confusion and suicidal ideas. The patient is nervous/anxious.        Objective:      Physical Exam   Constitutional: She is oriented to person, place, and time. She appears well-developed and well-nourished.   HENT:   Head: Normocephalic and atraumatic.   Right Ear: External ear normal.   Left Ear: External ear normal.   Mouth/Throat: Oropharynx is clear and moist.   Eyes: Pupils are equal, round, and reactive to light. Conjunctivae and EOM are normal.   Neck: Normal range of motion. Neck supple. No JVD present. No tracheal deviation present. No thyromegaly present.   Cardiovascular: Normal rate, regular rhythm, normal heart sounds and intact distal pulses.   Pulmonary/Chest: Effort normal and breath sounds normal. No respiratory distress. She has no wheezes. She has no rales. She exhibits no tenderness.   Abdominal: Soft. Bowel sounds are normal. She exhibits no distension and no mass. There is no  tenderness. There is no rebound and no guarding.   Musculoskeletal: Normal range of motion. She exhibits edema.   Lymphadenopathy:     She has no cervical adenopathy.   Neurological: She is alert and oriented to person, place, and time. She has normal reflexes. No cranial nerve deficit. She exhibits normal muscle tone. Coordination normal.   Skin: Skin is warm and dry.   Psychiatric: She has a normal mood and affect.   Nursing note and vitals reviewed.      Assessment:       1. Essential hypertension    2. Diabetes mellitus without complication    3. Anxiety disorder, unspecified type    4. Controlled type 2 diabetes mellitus without complication, without long-term current use of insulin    5. Coronary artery disease, angina presence unspecified, unspecified vessel or lesion type, unspecified whether native or transplanted heart        Plan:   Erin was seen today for follow-up, hyperlipidemia, hypertension, anxiety and diabetes.    Diagnoses and all orders for this visit:    Hyperlipidemia, unspecified hyperlipidemia type  -     Lipid panel; Future  The current medical regimen is effective;  continue present plan and medications.  Essential hypertension  -     amLODIPine (NORVASC) 2.5 MG tablet; Take 1 tablet (2.5 mg total) by mouth once daily.  -     carvedilol (COREG) 25 MG tablet; Take 1 tablet (25 mg total) by mouth 2 (two) times daily.  -     diabetic supplies, miscellan. Misc; [employer] TRUE METRIX AIR METER. USE AS DIRECTED TO TEST BLOOD SUGAR TWICE DAILY.  -     diabetic supplies, miscellan. Misc; [employer] TRUE METRIX TEST STRIPS. USE AS DIRECTED TO TEST BLOOD SUGAR TWICE DAILY  -     furosemide (LASIX) 20 MG tablet; Take 1 tablet (20 mg total) by mouth once daily.  -     irbesartan (AVAPRO) 300 MG tablet; Take 1 tablet (300 mg total) by mouth once daily.  -     CBC auto differential; Future  -     Comprehensive metabolic panel; Future    Well controlled.  Continue same medication and dose.  1. Keep weight  close to ideal body weight.   2.   Avoid high salt foods (olives, pickles, smoked meats, salted potato chips, etc.).   Do not add salt to your food at the table.   Use only small amounts of salt when cooking.   3. Begin an exercise program. Discuss with your doctor what type of exercise program would be best for you. It doesn't have to be difficult. Even brisk walking for 20 minutes three times a week is a good form of exercise.   4. Avoid medicines which contain heart stimulants. This includes many cold and sinus decongestant pills and sprays as well as diet pills. Check the warnings about hypertension on the label. Stimulants such as amphetamine or cocaine could be lethal for someone with hypertension. Never take these.    Diabetes mellitus without complication  -     diabetic supplies, miscellan. Misc; [employer] TRUE METRIX AIR METER. USE AS DIRECTED TO TEST BLOOD SUGAR TWICE DAILY.  -     diabetic supplies, miscellan. Misc; [employer] TRUE METRIX TEST STRIPS. USE AS DIRECTED TO TEST BLOOD SUGAR TWICE DAILY  -     gabapentin (NEURONTIN) 300 MG capsule; Take 1 capsule (300 mg total) by mouth 3 (three) times daily.  -     Microalbumin/creatinine urine ratio; Future  -     TSH; Future  -     Hemoglobin A1c; Future  Patient has controlled Diabetes .  We discussed about diet ;low in calories. Avoid sweats, sodas.  Also increasing activity;walking 2-3 miles a day.  I also adjusted medications and gave patient  instructions about adherence to plan.  Goal of  A1c  less than 7 % stressed.  Also goal of LDL less than 70 highlighted to patient.    Anxiety disorder, unspecified type  -     LORazepam (ATIVAN) 0.5 MG tablet; TAKE 1 TABLET EVERY 8 HOURS AS NEEDED    Controlled type 2 diabetes mellitus without complication, without long-term current use of insulin  -     metFORMIN (GLUCOPHAGE) 500 MG tablet; Take 1 tablet (500 mg total) by mouth every evening.  -     pravastatin (PRAVACHOL) 20 MG tablet; Take 1 tablet (20 mg total) by  mouth nightly.    Coronary artery disease, angina presence unspecified, unspecified vessel or lesion type, unspecified whether native or transplanted heart  -     nitroGLYCERIN (NITROSTAT) 0.4 MG SL tablet; Place 1 tablet (0.4 mg total) under the tongue every 5 (five) minutes as needed.  Stable    Gastroesophageal reflux disease, esophagitis presence not specified  -     ranitidine (ZANTAC) 150 MG tablet; Take 1 tablet (150 mg total) by mouth 2 (two) times daily.        Problem List Items Addressed This Visit     Anxiety disorder    Essential hypertension      Other Visit Diagnoses     Diabetes mellitus without complication        Controlled type 2 diabetes mellitus without complication, without long-term current use of insulin        Coronary artery disease, angina presence unspecified, unspecified vessel or lesion type, unspecified whether native or transplanted heart

## 2019-08-09 ENCOUNTER — PATIENT OUTREACH (OUTPATIENT)
Dept: ADMINISTRATIVE | Facility: HOSPITAL | Age: 84
End: 2019-08-09

## 2020-03-09 ENCOUNTER — OFFICE VISIT (OUTPATIENT)
Dept: INTERNAL MEDICINE | Facility: CLINIC | Age: 85
End: 2020-03-09
Payer: MEDICARE

## 2020-03-09 VITALS
BODY MASS INDEX: 32.18 KG/M2 | WEIGHT: 193.13 LBS | SYSTOLIC BLOOD PRESSURE: 132 MMHG | HEIGHT: 65 IN | RESPIRATION RATE: 18 BRPM | HEART RATE: 97 BPM | DIASTOLIC BLOOD PRESSURE: 80 MMHG

## 2020-03-09 DIAGNOSIS — E11.40 TYPE 2 DIABETES MELLITUS WITH DIABETIC NEUROPATHY, WITH LONG-TERM CURRENT USE OF INSULIN: ICD-10-CM

## 2020-03-09 DIAGNOSIS — F41.9 ANXIETY DISORDER, UNSPECIFIED TYPE: ICD-10-CM

## 2020-03-09 DIAGNOSIS — I10 ESSENTIAL HYPERTENSION: Primary | ICD-10-CM

## 2020-03-09 DIAGNOSIS — Z79.4 TYPE 2 DIABETES MELLITUS WITH DIABETIC NEUROPATHY, WITH LONG-TERM CURRENT USE OF INSULIN: ICD-10-CM

## 2020-03-09 DIAGNOSIS — E78.5 HYPERLIPIDEMIA, UNSPECIFIED HYPERLIPIDEMIA TYPE: ICD-10-CM

## 2020-03-09 DIAGNOSIS — K21.9 GASTROESOPHAGEAL REFLUX DISEASE, ESOPHAGITIS PRESENCE NOT SPECIFIED: ICD-10-CM

## 2020-03-09 DIAGNOSIS — I48.0 PAF (PAROXYSMAL ATRIAL FIBRILLATION): ICD-10-CM

## 2020-03-09 DIAGNOSIS — Z23 IMMUNIZATION DUE: ICD-10-CM

## 2020-03-09 PROCEDURE — 1126F AMNT PAIN NOTED NONE PRSNT: CPT | Mod: S$GLB,,, | Performed by: INTERNAL MEDICINE

## 2020-03-09 PROCEDURE — 1101F PR PT FALLS ASSESS DOC 0-1 FALLS W/OUT INJ PAST YR: ICD-10-PCS | Mod: CPTII,S$GLB,, | Performed by: INTERNAL MEDICINE

## 2020-03-09 PROCEDURE — 90714 TD VACCINE GREATER THAN OR EQUAL TO 7YO PRESERVATIVE FREE IM: ICD-10-PCS | Mod: S$GLB,,, | Performed by: INTERNAL MEDICINE

## 2020-03-09 PROCEDURE — 99999 PR PBB SHADOW E&M-EST. PATIENT-LVL III: ICD-10-PCS | Mod: PBBFAC,,, | Performed by: INTERNAL MEDICINE

## 2020-03-09 PROCEDURE — 90471 TD VACCINE GREATER THAN OR EQUAL TO 7YO PRESERVATIVE FREE IM: ICD-10-PCS | Mod: S$GLB,,, | Performed by: INTERNAL MEDICINE

## 2020-03-09 PROCEDURE — 99214 PR OFFICE/OUTPT VISIT, EST, LEVL IV, 30-39 MIN: ICD-10-PCS | Mod: 25,S$GLB,, | Performed by: INTERNAL MEDICINE

## 2020-03-09 PROCEDURE — 99214 OFFICE O/P EST MOD 30 MIN: CPT | Mod: 25,S$GLB,, | Performed by: INTERNAL MEDICINE

## 2020-03-09 PROCEDURE — 90471 IMMUNIZATION ADMIN: CPT | Mod: S$GLB,,, | Performed by: INTERNAL MEDICINE

## 2020-03-09 PROCEDURE — 1159F MED LIST DOCD IN RCRD: CPT | Mod: S$GLB,,, | Performed by: INTERNAL MEDICINE

## 2020-03-09 PROCEDURE — 1101F PT FALLS ASSESS-DOCD LE1/YR: CPT | Mod: CPTII,S$GLB,, | Performed by: INTERNAL MEDICINE

## 2020-03-09 PROCEDURE — 1159F PR MEDICATION LIST DOCUMENTED IN MEDICAL RECORD: ICD-10-PCS | Mod: S$GLB,,, | Performed by: INTERNAL MEDICINE

## 2020-03-09 PROCEDURE — 99999 PR PBB SHADOW E&M-EST. PATIENT-LVL III: CPT | Mod: PBBFAC,,, | Performed by: INTERNAL MEDICINE

## 2020-03-09 PROCEDURE — 90714 TD VACC NO PRESV 7 YRS+ IM: CPT | Mod: S$GLB,,, | Performed by: INTERNAL MEDICINE

## 2020-03-09 PROCEDURE — 1126F PR PAIN SEVERITY QUANTIFIED, NO PAIN PRESENT: ICD-10-PCS | Mod: S$GLB,,, | Performed by: INTERNAL MEDICINE

## 2020-03-09 RX ORDER — PANTOPRAZOLE SODIUM 40 MG/1
40 TABLET, DELAYED RELEASE ORAL DAILY
Qty: 90 TABLET | Refills: 1 | Status: SHIPPED | OUTPATIENT
Start: 2020-03-09 | End: 2020-07-28

## 2020-03-09 NOTE — PROGRESS NOTES
Subjective:       Patient ID: Erin Sahu is a 89 y.o. female.    Chief Complaint: Follow-up; Hypertension; Hyperlipidemia; and Anxiety    Erin Sahu is a 89 y.o. female who presents for Type II DM, Hypertension, and Hyperlipidemia follow up. Labs were reviewed with patient today.    Blood work done [facility] .          Hyperlipidemia   This is a chronic problem. The problem is controlled. Recent lipid tests were reviewed and are low. Associated symptoms include myalgias. Pertinent negatives include no chest pain or shortness of breath. Current antihyperlipidemic treatment includes statins. The current treatment provides moderate improvement of lipids.   Hypertension   This is a chronic problem. The problem is controlled. Associated symptoms include anxiety. Pertinent negatives include no chest pain, palpitations or shortness of breath. Risk factors for coronary artery disease include sedentary lifestyle, obesity, dyslipidemia and diabetes mellitus. Past treatments include diuretics. The current treatment provides moderate improvement.   Arthritis   Pertinent negatives include no dysuria or fever.   Anxiety   Presents for follow-up visit. Symptoms include nervous/anxious behavior. Patient reports no chest pain, confusion, dizziness, nausea, palpitations, shortness of breath or suicidal ideas.       Diabetes   She presents for her follow-up diabetic visit. She has type 2 diabetes mellitus. Hypoglycemia symptoms include nervousness/anxiousness. Pertinent negatives for hypoglycemia include no confusion, dizziness or pallor. Pertinent negatives for diabetes include no chest pain, no polydipsia and no polyphagia. Symptoms are improving. Current diabetic treatment includes insulin injections and oral agent (monotherapy). She is compliant with treatment most of the time. Her breakfast blood glucose range is generally  mg/dl.   Medication Refill   Associated symptoms include myalgias and numbness.  Pertinent negatives include no arthralgias, chest pain, chills, congestion, coughing, fever, nausea, sore throat or vomiting.     Review of Systems   Constitutional: Negative for chills and fever.   HENT: Negative for congestion, hearing loss, sinus pressure and sore throat.    Eyes: Negative for photophobia.   Respiratory: Negative for cough, choking, chest tightness, shortness of breath and wheezing.    Cardiovascular: Negative for chest pain and palpitations.   Gastrointestinal: Negative for blood in stool, nausea and vomiting.   Endocrine: Negative for polydipsia and polyphagia.   Genitourinary: Negative for dysuria and hematuria.   Musculoskeletal: Positive for arthritis, gait problem and myalgias. Negative for arthralgias.        Foot pain left   Skin: Negative for pallor.   Neurological: Positive for numbness. Negative for dizziness.        Numbness and tingling in feet; on gabapentin ;wants to see neurology .   Hematological: Does not bruise/bleed easily.   Psychiatric/Behavioral: Negative for confusion and suicidal ideas. The patient is nervous/anxious.        Objective:      Physical Exam   Constitutional: She is oriented to person, place, and time. She appears well-developed and well-nourished.   HENT:   Head: Normocephalic and atraumatic.   Right Ear: External ear normal.   Left Ear: External ear normal.   Mouth/Throat: Oropharynx is clear and moist.   Eyes: Pupils are equal, round, and reactive to light. Conjunctivae and EOM are normal.   Neck: Normal range of motion. Neck supple. No JVD present. No tracheal deviation present. No thyromegaly present.   Cardiovascular: Normal rate, regular rhythm, normal heart sounds and intact distal pulses.   Pulses:       Dorsalis pedis pulses are 1+ on the right side, and 1+ on the left side.        Posterior tibial pulses are 1+ on the right side, and 1+ on the left side.   Pulmonary/Chest: Effort normal and breath sounds normal. No respiratory distress. She has no  wheezes. She has no rales. She exhibits no tenderness.   Abdominal: Soft. Bowel sounds are normal. She exhibits no distension and no mass. There is no tenderness. There is no rebound and no guarding.   Musculoskeletal: Normal range of motion. She exhibits edema.   Feet:   Right Foot:   Protective Sensation: 7 sites tested. 2 sites sensed.   Skin Integrity: Negative for ulcer, erythema or dry skin.   Left Foot:   Protective Sensation: 7 sites tested. 2 sites sensed.   Skin Integrity: Negative for ulcer, erythema or dry skin.   Lymphadenopathy:     She has no cervical adenopathy.   Neurological: She is alert and oriented to person, place, and time. She has normal reflexes. No cranial nerve deficit. She exhibits normal muscle tone. Coordination normal.   Skin: Skin is warm and dry.   Psychiatric: She has a normal mood and affect.   Nursing note and vitals reviewed.      Assessment:       1. Essential hypertension    2. PAF (paroxysmal atrial fibrillation)    3. Hyperlipidemia, unspecified hyperlipidemia type    4. Anxiety disorder, unspecified type    5. Type 2 diabetes mellitus with diabetic neuropathy, with long-term current use of insulin    6. Gastroesophageal reflux disease, esophagitis presence not specified        Plan:   Erin was seen today for follow-up, hypertension, hyperlipidemia and anxiety.    Diagnoses and all orders for this visit:    Essential hypertension  -     CBC auto differential; Future  -     Lipid panel; Future    Well controlled.  Continue same medication and dose.  1. Keep weight close to ideal body weight.   2.   Avoid high salt foods (olives, pickles, smoked meats, salted potato chips, etc.).   Do not add salt to your food at the table.   Use only small amounts of salt when cooking.   3. Begin an exercise program. Discuss with your doctor what type of exercise program would be best for you. It doesn't have to be difficult. Even brisk walking for 20 minutes three times a week is a good  form of exercise.   4. Avoid medicines which contain heart stimulants. This includes many cold and sinus decongestant pills and sprays as well as diet pills. Check the warnings about hypertension on the label. Stimulants such as amphetamine or cocaine could be lethal for someone with hypertension. Never take these.    PAF (paroxysmal atrial fibrillation)  -     TSH; Future  Continue eliquis    Hyperlipidemia, unspecified hyperlipidemia type  -     Lipid panel; Future  -     Comprehensive metabolic panel; Future  Well controlled.  Continue same medication and dose.  Anxiety disorder, unspecified type  -     TSH; Future  Lorazepam helps     Type 2 diabetes mellitus with diabetic neuropathy, with long-term current use of insulin  -     Hemoglobin A1c; Future  -     Microalbumin/creatinine urine ratio; Future  Patient has controlled Diabetes .  We discussed about diet ;low in calories. Avoid sweats, sodas.  Also increasing activity;walking 2-3 miles a day.  I also adjusted medications and gave patient  instructions about adherence to plan.  Goal of  A1c  less than 7 % stressed.  Also goal of LDL less than 70 highlighted to patient.    Gastroesophageal reflux disease, esophagitis presence not specified  -     pantoprazole (PROTONIX) 40 MG tablet; Take 1 tablet (40 mg total) by mouth once daily.  -     CBC auto differential; Future  DC zantac   Omeprazole not helping       Problem List Items Addressed This Visit     Hyperlipidemia    Diabetes mellitus with neuropathy    Anxiety disorder    Essential hypertension - Primary    PAF (paroxysmal atrial fibrillation)      Other Visit Diagnoses     Gastroesophageal reflux disease, esophagitis presence not specified        Relevant Medications    pantoprazole (PROTONIX) 40 MG tablet

## 2020-03-23 DIAGNOSIS — F41.9 ANXIETY DISORDER, UNSPECIFIED TYPE: ICD-10-CM

## 2020-03-23 RX ORDER — LORAZEPAM 0.5 MG/1
TABLET ORAL
Qty: 90 TABLET | Refills: 1 | Status: ON HOLD | OUTPATIENT
Start: 2020-03-23 | End: 2020-09-21 | Stop reason: HOSPADM

## 2020-03-23 NOTE — TELEPHONE ENCOUNTER
Requested Prescriptions     Pending Prescriptions Disp Refills    LORazepam (ATIVAN) 0.5 MG tablet 90 tablet 1     Sig: TAKE 1 TABLET EVERY 8 HOURS AS NEEDED   [facility]. LOV: 3/9/20

## 2020-03-24 DIAGNOSIS — E11.9 DIABETES MELLITUS WITHOUT COMPLICATION: ICD-10-CM

## 2020-03-24 DIAGNOSIS — I10 ESSENTIAL HYPERTENSION: ICD-10-CM

## 2020-03-24 RX ORDER — FUROSEMIDE 20 MG/1
20 TABLET ORAL DAILY
Qty: 90 TABLET | Refills: 1 | Status: ON HOLD | OUTPATIENT
Start: 2020-03-24 | End: 2020-06-29 | Stop reason: SDUPTHER

## 2020-03-24 RX ORDER — GABAPENTIN 300 MG/1
300 CAPSULE ORAL 3 TIMES DAILY
Qty: 270 CAPSULE | Refills: 1 | Status: ON HOLD | OUTPATIENT
Start: 2020-03-24 | End: 2020-09-21 | Stop reason: HOSPADM

## 2020-03-24 NOTE — TELEPHONE ENCOUNTER
----- Message from Lidia Melvi sent at 3/24/2020  9:52 AM CDT -----  Contact: self   Erin Sahu  MRN: [id number]  : 1929  PCP: Junior Godinez  Home Phone      [phone]  Work Phone      Not on file.  Mobile          Not on file.    MESSAGE:   Rx refills:  furosemide (LASIX) 20 MG tablet  gabapentin (NEURONTIN) 300 MG capsule  90 day Rx requested     Phone # [phone]    Pharmacy - [facility] - [place] [id number]

## 2020-04-29 ENCOUNTER — TELEPHONE (OUTPATIENT)
Dept: INTERNAL MEDICINE | Facility: CLINIC | Age: 85
End: 2020-04-29

## 2020-04-29 NOTE — TELEPHONE ENCOUNTER
Yes, hold her PM coreg. If she didn't take her amlodipine or irbesartan this AM don't that either. Must keep follow up with Dr. Peralta or come see us for medication adjustment and labs. Thanks

## 2020-04-29 NOTE — TELEPHONE ENCOUNTER
Pt was in ICU at [facility] a couple of days ago for low BP. 50s/30s low. Today she is 80/50 but had a fall. LOS instructed to cut carvedilol in half. So she is now taking 12.5 mg bid. Should she hold carvedilol fully? SHe has an appt with Dr Peralta tomorrow. Please advise. Granddaughter Kimberly [phone]

## 2020-06-24 ENCOUNTER — HOSPITAL ENCOUNTER (INPATIENT)
Facility: HOSPITAL | Age: 85
LOS: 5 days | Discharge: HOME HEALTH | DRG: 871 | End: 2020-06-29
Attending: EMERGENCY MEDICINE | Admitting: STUDENT IN AN ORGANIZED HEALTH CARE EDUCATION/TRAINING PROGRAM
Payer: MEDICARE

## 2020-06-24 DIAGNOSIS — Z91.89 AT RISK FOR PROLONGED QT INTERVAL SYNDROME: ICD-10-CM

## 2020-06-24 DIAGNOSIS — I10 ESSENTIAL HYPERTENSION: ICD-10-CM

## 2020-06-24 DIAGNOSIS — R79.89 ELEVATED LFTS: ICD-10-CM

## 2020-06-24 DIAGNOSIS — N17.9 SEPSIS DUE TO GRAM-NEGATIVE ORGANISM WITH ACUTE RENAL FAILURE: Primary | ICD-10-CM

## 2020-06-24 DIAGNOSIS — I95.9 HYPOTENSION: ICD-10-CM

## 2020-06-24 DIAGNOSIS — A41.50 SEPSIS DUE TO GRAM-NEGATIVE ORGANISM WITH ACUTE RENAL FAILURE: Primary | ICD-10-CM

## 2020-06-24 DIAGNOSIS — I95.9 HYPOTENSION, UNSPECIFIED HYPOTENSION TYPE: ICD-10-CM

## 2020-06-24 DIAGNOSIS — Z91.89 AT RISK FOR LONG QT SYNDROME: ICD-10-CM

## 2020-06-24 DIAGNOSIS — R00.0 TACHYCARDIA: ICD-10-CM

## 2020-06-24 DIAGNOSIS — R65.20 SEPSIS DUE TO GRAM-NEGATIVE ORGANISM WITH ACUTE RENAL FAILURE: Primary | ICD-10-CM

## 2020-06-24 PROBLEM — R74.01 TRANSAMINITIS: Status: ACTIVE | Noted: 2020-06-24

## 2020-06-24 PROBLEM — R57.9 SHOCK: Status: ACTIVE | Noted: 2020-06-24

## 2020-06-24 LAB
ALBUMIN SERPL BCP-MCNC: 3 G/DL (ref 3.5–5.2)
ALP SERPL-CCNC: 214 U/L (ref 55–135)
ALT SERPL W/O P-5'-P-CCNC: 633 U/L (ref 10–44)
AMMONIA PLAS-SCNC: 34 UMOL/L (ref 10–50)
ANION GAP SERPL CALC-SCNC: 13 MMOL/L (ref 8–16)
AST SERPL-CCNC: 832 U/L (ref 10–40)
BACTERIA #/AREA URNS AUTO: ABNORMAL /HPF
BASOPHILS # BLD AUTO: 0.01 K/UL (ref 0–0.2)
BASOPHILS NFR BLD: 0.1 % (ref 0–1.9)
BILIRUB SERPL-MCNC: 6.2 MG/DL (ref 0.1–1)
BILIRUB UR QL STRIP: ABNORMAL
BUN SERPL-MCNC: 24 MG/DL (ref 8–23)
CALCIUM SERPL-MCNC: 8.6 MG/DL (ref 8.7–10.5)
CHLORIDE SERPL-SCNC: 99 MMOL/L (ref 95–110)
CLARITY UR REFRACT.AUTO: ABNORMAL
CO2 SERPL-SCNC: 25 MMOL/L (ref 23–29)
COLOR UR AUTO: ABNORMAL
CREAT SERPL-MCNC: 1.6 MG/DL (ref 0.5–1.4)
CREAT UR-MCNC: 277 MG/DL (ref 15–325)
DIFFERENTIAL METHOD: ABNORMAL
EOSINOPHIL # BLD AUTO: 0 K/UL (ref 0–0.5)
EOSINOPHIL NFR BLD: 0 % (ref 0–8)
ERYTHROCYTE [DISTWIDTH] IN BLOOD BY AUTOMATED COUNT: 13.9 % (ref 11.5–14.5)
EST. GFR  (AFRICAN AMERICAN): 32.5 ML/MIN/1.73 M^2
EST. GFR  (NON AFRICAN AMERICAN): 28.2 ML/MIN/1.73 M^2
GLUCOSE SERPL-MCNC: 149 MG/DL (ref 70–110)
GLUCOSE UR QL STRIP: ABNORMAL
HCT VFR BLD AUTO: 41.5 % (ref 37–48.5)
HGB BLD-MCNC: 13.9 G/DL (ref 12–16)
HGB UR QL STRIP: ABNORMAL
HYALINE CASTS UR QL AUTO: 7 /LPF
IMM GRANULOCYTES # BLD AUTO: 0.16 K/UL (ref 0–0.04)
IMM GRANULOCYTES NFR BLD AUTO: 1.2 % (ref 0–0.5)
INR PPP: 1.3 (ref 0.8–1.2)
KETONES UR QL STRIP: NEGATIVE
LACTATE SERPL-SCNC: 3.1 MMOL/L (ref 0.5–2.2)
LEUKOCYTE ESTERASE UR QL STRIP: NEGATIVE
LYMPHOCYTES # BLD AUTO: 1 K/UL (ref 1–4.8)
LYMPHOCYTES NFR BLD: 7.4 % (ref 18–48)
MAGNESIUM SERPL-MCNC: 1.1 MG/DL (ref 1.6–2.6)
MCH RBC QN AUTO: 33.4 PG (ref 27–31)
MCHC RBC AUTO-ENTMCNC: 33.5 G/DL (ref 32–36)
MCV RBC AUTO: 100 FL (ref 82–98)
MICROSCOPIC COMMENT: ABNORMAL
MONOCYTES # BLD AUTO: 0.9 K/UL (ref 0.3–1)
MONOCYTES NFR BLD: 6.6 % (ref 4–15)
NEUTROPHILS # BLD AUTO: 11.5 K/UL (ref 1.8–7.7)
NEUTROPHILS NFR BLD: 84.7 % (ref 38–73)
NITRITE UR QL STRIP: NEGATIVE
NON-SQ EPI CELLS #/AREA URNS AUTO: 1 /HPF
NRBC BLD-RTO: 0 /100 WBC
PH UR STRIP: 5 [PH] (ref 5–8)
PHOSPHATE SERPL-MCNC: 4.7 MG/DL (ref 2.7–4.5)
PLATELET # BLD AUTO: 102 K/UL (ref 150–350)
PMV BLD AUTO: 10.3 FL (ref 9.2–12.9)
POCT GLUCOSE: 164 MG/DL (ref 70–110)
POTASSIUM SERPL-SCNC: 3.9 MMOL/L (ref 3.5–5.1)
PROCALCITONIN SERPL IA-MCNC: 41.55 NG/ML
PROT SERPL-MCNC: 5.6 G/DL (ref 6–8.4)
PROT UR QL STRIP: ABNORMAL
PROTHROMBIN TIME: 12.7 SEC (ref 9–12.5)
RBC # BLD AUTO: 4.16 M/UL (ref 4–5.4)
RBC #/AREA URNS AUTO: 9 /HPF (ref 0–4)
SARS-COV-2 RDRP RESP QL NAA+PROBE: NEGATIVE
SODIUM SERPL-SCNC: 137 MMOL/L (ref 136–145)
SODIUM UR-SCNC: <20 MMOL/L (ref 20–250)
SP GR UR STRIP: 1.03 (ref 1–1.03)
SQUAMOUS #/AREA URNS AUTO: 16 /HPF
TROPONIN I SERPL DL<=0.01 NG/ML-MCNC: 0.1 NG/ML (ref 0–0.03)
URN SPEC COLLECT METH UR: ABNORMAL
WBC # BLD AUTO: 13.6 K/UL (ref 3.9–12.7)
WBC #/AREA URNS AUTO: 46 /HPF (ref 0–5)

## 2020-06-24 PROCEDURE — 82140 ASSAY OF AMMONIA: CPT

## 2020-06-24 PROCEDURE — 83735 ASSAY OF MAGNESIUM: CPT

## 2020-06-24 PROCEDURE — 84145 PROCALCITONIN (PCT): CPT

## 2020-06-24 PROCEDURE — 80053 COMPREHEN METABOLIC PANEL: CPT

## 2020-06-24 PROCEDURE — 63600175 PHARM REV CODE 636 W HCPCS: Performed by: STUDENT IN AN ORGANIZED HEALTH CARE EDUCATION/TRAINING PROGRAM

## 2020-06-24 PROCEDURE — 81001 URINALYSIS AUTO W/SCOPE: CPT

## 2020-06-24 PROCEDURE — 84100 ASSAY OF PHOSPHORUS: CPT

## 2020-06-24 PROCEDURE — 87186 SC STD MICRODIL/AGAR DIL: CPT

## 2020-06-24 PROCEDURE — 82570 ASSAY OF URINE CREATININE: CPT

## 2020-06-24 PROCEDURE — 99499 UNLISTED E&M SERVICE: CPT | Mod: ,,, | Performed by: EMERGENCY MEDICINE

## 2020-06-24 PROCEDURE — 99285 EMERGENCY DEPT VISIT HI MDM: CPT | Mod: 25

## 2020-06-24 PROCEDURE — 87086 URINE CULTURE/COLONY COUNT: CPT

## 2020-06-24 PROCEDURE — 85025 COMPLETE CBC W/AUTO DIFF WBC: CPT

## 2020-06-24 PROCEDURE — 93010 EKG 12-LEAD: ICD-10-PCS | Mod: ,,, | Performed by: INTERNAL MEDICINE

## 2020-06-24 PROCEDURE — 25000003 PHARM REV CODE 250: Performed by: STUDENT IN AN ORGANIZED HEALTH CARE EDUCATION/TRAINING PROGRAM

## 2020-06-24 PROCEDURE — 87077 CULTURE AEROBIC IDENTIFY: CPT

## 2020-06-24 PROCEDURE — 85610 PROTHROMBIN TIME: CPT

## 2020-06-24 PROCEDURE — 11000001 HC ACUTE MED/SURG PRIVATE ROOM

## 2020-06-24 PROCEDURE — 83605 ASSAY OF LACTIC ACID: CPT

## 2020-06-24 PROCEDURE — 93005 ELECTROCARDIOGRAM TRACING: CPT

## 2020-06-24 PROCEDURE — 87040 BLOOD CULTURE FOR BACTERIA: CPT | Mod: 59

## 2020-06-24 PROCEDURE — U0002 COVID-19 LAB TEST NON-CDC: HCPCS

## 2020-06-24 PROCEDURE — 93010 ELECTROCARDIOGRAM REPORT: CPT | Mod: ,,, | Performed by: INTERNAL MEDICINE

## 2020-06-24 PROCEDURE — 99499 NO LOS: ICD-10-PCS | Mod: ,,, | Performed by: EMERGENCY MEDICINE

## 2020-06-24 PROCEDURE — 84484 ASSAY OF TROPONIN QUANT: CPT

## 2020-06-24 PROCEDURE — 84300 ASSAY OF URINE SODIUM: CPT

## 2020-06-24 RX ORDER — SODIUM CHLORIDE 0.9 % (FLUSH) 0.9 %
10 SYRINGE (ML) INJECTION
Status: DISCONTINUED | OUTPATIENT
Start: 2020-06-24 | End: 2020-06-29 | Stop reason: HOSPADM

## 2020-06-24 RX ORDER — IBUPROFEN 200 MG
16 TABLET ORAL
Status: DISCONTINUED | OUTPATIENT
Start: 2020-06-24 | End: 2020-06-29 | Stop reason: HOSPADM

## 2020-06-24 RX ORDER — GLUCAGON 1 MG
1 KIT INJECTION
Status: DISCONTINUED | OUTPATIENT
Start: 2020-06-24 | End: 2020-06-29 | Stop reason: HOSPADM

## 2020-06-24 RX ORDER — IBUPROFEN 200 MG
24 TABLET ORAL
Status: DISCONTINUED | OUTPATIENT
Start: 2020-06-24 | End: 2020-06-29 | Stop reason: HOSPADM

## 2020-06-24 RX ORDER — PANTOPRAZOLE SODIUM 40 MG/1
40 TABLET, DELAYED RELEASE ORAL DAILY
Status: DISCONTINUED | OUTPATIENT
Start: 2020-06-25 | End: 2020-06-29 | Stop reason: HOSPADM

## 2020-06-24 RX ORDER — MAGNESIUM SULFATE HEPTAHYDRATE 40 MG/ML
2 INJECTION, SOLUTION INTRAVENOUS ONCE
Status: COMPLETED | OUTPATIENT
Start: 2020-06-24 | End: 2020-06-25

## 2020-06-24 RX ORDER — INSULIN ASPART 100 [IU]/ML
0-5 INJECTION, SOLUTION INTRAVENOUS; SUBCUTANEOUS
Status: DISCONTINUED | OUTPATIENT
Start: 2020-06-24 | End: 2020-06-27

## 2020-06-24 RX ADMIN — SODIUM CHLORIDE, SODIUM LACTATE, POTASSIUM CHLORIDE, AND CALCIUM CHLORIDE 500 ML: .6; .31; .03; .02 INJECTION, SOLUTION INTRAVENOUS at 08:06

## 2020-06-24 RX ADMIN — MAGNESIUM SULFATE 2 G: 2 INJECTION INTRAVENOUS at 11:06

## 2020-06-24 RX ADMIN — SODIUM CHLORIDE, SODIUM LACTATE, POTASSIUM CHLORIDE, AND CALCIUM CHLORIDE 500 ML: .6; .31; .03; .02 INJECTION, SOLUTION INTRAVENOUS at 11:06

## 2020-06-24 RX ADMIN — PIPERACILLIN AND TAZOBACTAM 4.5 G: 4; .5 INJECTION, POWDER, LYOPHILIZED, FOR SOLUTION INTRAVENOUS; PARENTERAL at 11:06

## 2020-06-24 NOTE — ED NOTES
Pt's grand daughter, Kimberly, would like updates.  [phone].  She states that she is the power of

## 2020-06-24 NOTE — ED PROVIDER NOTES
Encounter Date: 6/24/2020       History     Chief Complaint   Patient presents with    Transfer     from [facility] for liver failure     89 yo f, transfer from OSH for direct admission, sent to ED for COVID swab?  Had COVID swab documented at OSH this am that was negative.    The history is provided by the patient.     Review of patient's allergies indicates:   Allergen Reactions    Iodinated contrast media Hives     Past Medical History:   Diagnosis Date    Anxiety     Arthritis     Coronary artery disease     Diabetes mellitus type II     HHD (hypertensive heart disease)     Hyperlipidemia     Hypertension     Mitral valve disease     Pacemaker     Syncope and collapse      Past Surgical History:   Procedure Laterality Date    BACK SURGERY      BREAST SURGERY      CARDIAC PACEMAKER PLACEMENT  01/23/2009    CHOLECYSTECTOMY      CORONARY ANGIOPLASTY WITH STENT PLACEMENT  03/2001    EYE SURGERY Bilateral 06/2001    cataracts    HEMORRHOID SURGERY      HYSTERECTOMY      REPLACEMENT OF PACEMAKER GENERATOR N/A 5/1/2019    Procedure: REPLACEMENT, PACEMAKER GENERATOR;  Surgeon: Ezio Mclean MD;  Location: [facility] CATH;  Service: Cardiology;  Laterality: N/A;    ROTATOR CUFF REPAIR Right     TOTAL KNEE ARTHROPLASTY Right      No family history on file.  Social History     Tobacco Use    Smoking status: Never Smoker    Smokeless tobacco: Never Used   Substance Use Topics    Alcohol use: No    Drug use: No     Review of Systems   Unable to perform ROS: Acuity of condition       Physical Exam     Initial Vitals [06/24/20 1808]   BP Pulse Resp Temp SpO2   (!) 80/47 102 (!) 22 98.1 °F (36.7 °C) 96 %      MAP       --         Physical Exam    Nursing note and vitals reviewed.  Constitutional: She appears well-developed and well-nourished. No distress.   Eyes: Conjunctivae are normal.   Neck: Neck supple.   Pulmonary/Chest: No respiratory distress.   Neurological: She is alert.   Skin: Skin is  warm and dry.         ED Course   Procedures  Labs Reviewed   MAGNESIUM - Abnormal; Notable for the following components:       Result Value    Magnesium 1.1 (*)     All other components within normal limits   PHOSPHORUS - Abnormal; Notable for the following components:    Phosphorus 4.7 (*)     All other components within normal limits   PROTIME-INR - Abnormal; Notable for the following components:    Prothrombin Time 12.7 (*)     INR 1.3 (*)     All other components within normal limits   CBC W/ AUTO DIFFERENTIAL - Abnormal; Notable for the following components:    WBC 13.60 (*)     Mean Corpuscular Volume 100 (*)     Mean Corpuscular Hemoglobin 33.4 (*)     Platelets 102 (*)     Immature Granulocytes 1.2 (*)     Gran # (ANC) 11.5 (*)     Immature Grans (Abs) 0.16 (*)     Gran% 84.7 (*)     Lymph% 7.4 (*)     All other components within normal limits   COMPREHENSIVE METABOLIC PANEL - Abnormal; Notable for the following components:    Glucose 149 (*)     BUN, Bld 24 (*)     Creatinine 1.6 (*)     Calcium 8.6 (*)     Total Protein 5.6 (*)     Albumin 3.0 (*)     Total Bilirubin 6.2 (*)     Alkaline Phosphatase 214 (*)      (*)      (*)     eGFR if  32.5 (*)     eGFR if non  28.2 (*)     All other components within normal limits   PROCALCITONIN - Abnormal; Notable for the following components:    Procalcitonin 41.55 (*)     All other components within normal limits   TROPONIN I - Abnormal; Notable for the following components:    Troponin I 0.103 (*)     All other components within normal limits   LACTIC ACID, PLASMA - Abnormal; Notable for the following components:    Lactate (Lactic Acid) 3.1 (*)     All other components within normal limits   POCT GLUCOSE - Abnormal; Notable for the following components:    POCT Glucose 164 (*)     All other components within normal limits   CULTURE, BLOOD   CULTURE, BLOOD   SARS-COV-2 RNA AMPLIFICATION, QUAL   AMMONIA   LACTIC ACID,  PLASMA   TROPONIN I   POCT GLUCOSE MONITORING CONTINUOUS        ECG Results          EKG 12-lead (In process)  Result time 06/24/20 20:34:51    In process by Interface, Lab In [facility] (06/24/20 20:34:51)                 Narrative:    Test Reason : R00.0,    Vent. Rate : 104 BPM     Atrial Rate : 119 BPM     P-R Int : 000 ms          QRS Dur : 132 ms      QT Int : 398 ms       P-R-T Axes : 000 -41 016 degrees     QTc Int : 523 ms    Atrial fibrillation with rapid ventricular response  Left axis deviation  Right bundle branch block  Abnormal ECG  When compared with ECG of 08-MAR-2017 08:07,  Atrial fibrillation has replaced Electronic atrial pacemaker  Vent. rate has increased BY  42 BPM  Nonspecific T wave abnormality, improved in Inferior leads  Nonspecific T wave abnormality, improved in Lateral leads  QT has lengthened    Referred By: [email or web address]   SELF           Confirmed By:                             Imaging Results          CT Abdomen Pelvis  Without Contrast (In process)                X-Ray Chest 1 View (Final result)  Result time 06/24/20 21:00:06    Final result by Fredi Monge MD (06/24/20 21:00:06)                 Impression:      Cardiomegaly with mild pulmonary vascular congestion, suggestive of mild pulmonary edema secondary to CHF.      Electronically signed by: Fredi Monge MD  Date:    06/24/2020  Time:    21:00             Narrative:    EXAMINATION:  XR CHEST 1 VIEW    CLINICAL HISTORY:  hypoxia;    TECHNIQUE:  Single frontal view of the chest was performed.    COMPARISON:  03/07/2017.    FINDINGS:  There is unchanged appearance of a double left-sided cardiac pacing device.  Monitoring EKG leads are present.  There are postoperative changes in the right shoulder.    The trachea is unremarkable.  There are calcifications of the aortic knob.  The cardiomediastinal silhouette is enlarged.  There is no evidence of free air beneath the hemidiaphragms.  There are no pleural effusions.  There is no  evidence of a pneumothorax.  There is no evidence of pneumomediastinum.  There is mild pulmonary vascular congestion.  There are degenerative changes in the osseous structures.                                 Medical Decision Making:   History:   Old Medical Records: I decided to obtain old medical records.  Initial Assessment:   Direct admission to medicine for COVID testing though negative COVID test today                                     Clinical Impression:       ICD-10-CM ICD-9-CM   1. Hypotension, unspecified hypotension type  I95.9 458.9   2. Tachycardia  R00.0 785.0   3. Hypotension  I95.9 458.9             ED Disposition Condition    Admit                           Margaret Red MD  06/24/20 [id number]

## 2020-06-24 NOTE — PLAN OF CARE
"[facility] admissions ONLY: Please call extension [phone] upon patient arrival to floor for Hospital Medicine admit team assignment and for additional admit orders for the patient. Do not page the attending, staff physician or Advanced Practice Provider with the patient on arrival (may not be in-house at the time of arrival). Call back or wait to leave beeper number when prompted.     Outside Transfer Acceptance Note        Patients name/MRN:    Erin Sahu, MRN: [id number]     Referring Physician or Mid-Level provider giving report:    Nirmal Thornton MD     Referral Facility: [facility] ED     Date/Time of Acceptance:     6/24/20 at 12:45pm     Accepting Physician for admission to hospital: FRANCES Matthews MD ()     Accepting facility:     [facility], Ed for Covid screen     Consulting Physicians from [facility] involved in case:    Dr. Delarosa, ICU staff [facility]    Reason for transfer request:     COVID NEGATIVE ED AT [facility] 6/24/20       Dr Godinez's patient at [facility].  Ms. Sahu is an elderly lady who is a poor historian.  She has a past medical history of HTN, syncope, Afib on Eliquis, HLD on Pravastatin, unknown type of CHF, DM2 and CAD.  She was found weak and dizzy at home with shortness of breath and nausea.  She as had hypotensive episodes in the past attributed to her BP meds.  Her family measured her BP at home and it was 74/40.  EMS was called and they measured SBP of 110.  When she arrived in the ED, her BP was back down to low 80's.  She received 600 cc of fluid and her current BP is still only 82/47.  In the interim she had O2 sats in the mid 90's on 2L NC, which suddenly decreased to 82% for unknown reason (was prior to fluids), so she was placed on 40% venti mask with current O2 sats of 96%.      By physical exam, she is awake and following all commands, but appears listless and "generally sick."  Labs eventually revealed: CR 1.12, BUN 19, AST 1626, , " TB 5.1, , WBC 4.7, , normal TROP, CPK and TSH.  She had no LA, NH3 or procal done.  Her only radiograph is a CXR, normal except mild left C/P blunting.  Her urine is normal.       The only therapy she has been given in the ED is 600 cc NS.  LA after fluids is 2.72, which was discussed with ICU.     To Do List upon arrival:     CT abdomen vs US  Consult Hepatology vs AES depending on etiology of elevated TB and LFT's  Hold Statin and ARB  She is currently on no pressor agents  Going to ED for COVID screen    Vital signs:     Temp:  [99.7 °F (37.6 °C)] 99.7 °F (37.6 °C)  Pulse:  [72] 72  Resp:  [24] 24  SpO2:  [93 %] 93 %  BP: (82)/(45) 82/45     Past Medical History:   Diagnosis Date    Anxiety     Arthritis     Coronary artery disease     Diabetes mellitus type II     HHD (hypertensive heart disease)     Hyperlipidemia     Hypertension     Mitral valve disease     Pacemaker     Syncope and collapse      * Atrial fibrillation  No current facility-administered medications for this encounter.     Current Outpatient Medications:     alcohol swabs PadM, Apply 1 each topically once daily., Disp: 100 each, Rfl: 6    amLODIPine (NORVASC) 2.5 MG tablet, Take 1 tablet (2.5 mg total) by mouth once daily. (Patient taking differently: Take 5 mg by mouth once daily. ), Disp: 90 tablet, Rfl: 1    apixaban (ELIQUIS) 2.5 mg Tab, Take 1 tablet (2.5 mg total) by mouth 2 (two) times daily., Disp: , Rfl:     aspirin (ECOTRIN) 81 MG EC tablet, Take 1 tablet (81 mg total) by mouth once daily., Disp: 1 Bottle, Rfl: 1    biotin 5 mg Cap, Take 1 capsule by mouth once daily at 6am., Disp: , Rfl:     carvedilol (COREG) 25 MG tablet, Take 1 tablet (25 mg total) by mouth 2 (two) times daily., Disp: 180 tablet, Rfl: 6    diabetic supplies, miscellan. Misc, TRUEPLUS SUPER THIN 28G LANCET. USE AS DIRECTED TO TEST BLOOD SUGAR TWICE DAILY., Disp: 100 each, Rfl: 12    diabetic supplies, miscellan. Mis, [employer] TRUE  METRIX AIR METER. USE AS DIRECTED TO TEST BLOOD SUGAR TWICE DAILY., Disp: 1 each, Rfl: 0    diabetic supplies, miscellan. Misc, [employer] TRUE METRIX TEST STRIPS. USE AS DIRECTED TO TEST BLOOD SUGAR TWICE DAILY, Disp: 100 each, Rfl: 12    furosemide (LASIX) 20 MG tablet, Take 1 tablet (20 mg total) by mouth once daily., Disp: 90 tablet, Rfl: 1    gabapentin (NEURONTIN) 300 MG capsule, Take 1 capsule (300 mg total) by mouth 3 (three) times daily., Disp: 270 capsule, Rfl: 1    irbesartan (AVAPRO) 300 MG tablet, Take 1 tablet (300 mg total) by mouth once daily., Disp: 90 tablet, Rfl: 1    LORazepam (ATIVAN) 0.5 MG tablet, TAKE 1 TABLET EVERY 8 HOURS AS NEEDED, Disp: 90 tablet, Rfl: 1    metFORMIN (GLUCOPHAGE) 500 MG tablet, Take 1 tablet (500 mg total) by mouth every evening., Disp: 90 tablet, Rfl: 3    nitroGLYCERIN (NITROSTAT) 0.4 MG SL tablet, Place 1 tablet (0.4 mg total) under the tongue every 5 (five) minutes as needed., Disp: 30 tablet, Rfl: 1    omega-3 fatty acids-vitamin E (FISH OIL) 1,000 mg Cap, Take 1 capsule by mouth once daily., Disp: 90 each, Rfl: 1    pantoprazole (PROTONIX) 40 MG tablet, Take 1 tablet (40 mg total) by mouth once daily., Disp: 90 tablet, Rfl: 1    pravastatin (PRAVACHOL) 20 MG tablet, Take 1 tablet (20 mg total) by mouth nightly., Disp: 90 tablet, Rfl: 1    TRUE METRIX GLUCOSE TEST STRIP Strp, USE TO TEST BLOOD SUGARS 2 TIMES DAILY., Disp: 200 strip, Rfl: 12    vitamin D 185 MG Tab, Take 185 mg by mouth once daily.  , Disp: , Rfl:     LABS:    AST 1626  Tbili 5.1  ALK/Phos 278  INR not ordered - Pt on Eliquis    Imaging:    FRANCES Matthews MD  Department of Hospital Medicine  Patient Flow Center/   [phone]

## 2020-06-24 NOTE — ED TRIAGE NOTES
Erin Sahu, a 89 y.o. female presents to the ED as a transfer from the [facility] for hypotension and liver issues.     Triage note:  Chief Complaint   Patient presents with    Transfer     from [facility] for liver failure     Review of patient's allergies indicates:   Allergen Reactions    Iodinated contrast media Hives     Past Medical History:   Diagnosis Date    Anxiety     Arthritis     Coronary artery disease     Diabetes mellitus type II     HHD (hypertensive heart disease)     Hyperlipidemia     Hypertension     Mitral valve disease     Pacemaker     Syncope and collapse

## 2020-06-25 PROBLEM — N17.9 AKI (ACUTE KIDNEY INJURY): Status: ACTIVE | Noted: 2020-06-25

## 2020-06-25 PROBLEM — R79.89 ELEVATED LFTS: Status: ACTIVE | Noted: 2020-06-25

## 2020-06-25 LAB
ALBUMIN SERPL BCP-MCNC: 2.6 G/DL (ref 3.5–5.2)
ALP SERPL-CCNC: 168 U/L (ref 55–135)
ALT SERPL W/O P-5'-P-CCNC: 439 U/L (ref 10–44)
ANA SER QL IF: NORMAL
ANION GAP SERPL CALC-SCNC: 11 MMOL/L (ref 8–16)
ASCENDING AORTA: 3.32 CM
AST SERPL-CCNC: 479 U/L (ref 10–40)
AV INDEX (PROSTH): 0.38
AV MEAN GRADIENT: 13 MMHG
AV PEAK GRADIENT: 23 MMHG
AV VALVE AREA: 1.32 CM2
AV VELOCITY RATIO: 0.32
BASOPHILS # BLD AUTO: 0.01 K/UL (ref 0–0.2)
BASOPHILS NFR BLD: 0.1 % (ref 0–1.9)
BILIRUB DIRECT SERPL-MCNC: 4.1 MG/DL (ref 0.1–0.3)
BILIRUB SERPL-MCNC: 5.2 MG/DL (ref 0.1–1)
BSA FOR ECHO PROCEDURE: 1.91 M2
BUN SERPL-MCNC: 30 MG/DL (ref 8–23)
CALCIUM SERPL-MCNC: 8.1 MG/DL (ref 8.7–10.5)
CHLORIDE SERPL-SCNC: 100 MMOL/L (ref 95–110)
CO2 SERPL-SCNC: 26 MMOL/L (ref 23–29)
CREAT SERPL-MCNC: 1.9 MG/DL (ref 0.5–1.4)
CV ECHO LV RWT: 0.41 CM
DIFFERENTIAL METHOD: ABNORMAL
DOP CALC AO PEAK VEL: 2.38 M/S
DOP CALC AO VTI: 47.49 CM
DOP CALC LVOT AREA: 3.5 CM2
DOP CALC LVOT DIAMETER: 2.11 CM
DOP CALC LVOT PEAK VEL: 0.75 M/S
DOP CALC LVOT STROKE VOLUME: 62.7 CM3
DOP CALCLVOT PEAK VEL VTI: 17.94 CM
E WAVE DECELERATION TIME: 306.47 MSEC
E/A RATIO: 1.17
E/E' RATIO: 23.71 M/S
ECHO LV POSTERIOR WALL: 0.98 CM (ref 0.6–1.1)
EOSINOPHIL # BLD AUTO: 0 K/UL (ref 0–0.5)
EOSINOPHIL NFR BLD: 0.1 % (ref 0–8)
ERYTHROCYTE [DISTWIDTH] IN BLOOD BY AUTOMATED COUNT: 13.9 % (ref 11.5–14.5)
EST. GFR  (AFRICAN AMERICAN): 26.4 ML/MIN/1.73 M^2
EST. GFR  (NON AFRICAN AMERICAN): 22.9 ML/MIN/1.73 M^2
FRACTIONAL SHORTENING: 48 % (ref 28–44)
GLUCOSE SERPL-MCNC: 101 MG/DL (ref 70–110)
HAV IGM SERPL QL IA: NEGATIVE
HBV CORE IGM SERPL QL IA: NEGATIVE
HBV SURFACE AG SERPL QL IA: NEGATIVE
HCT VFR BLD AUTO: 37.6 % (ref 37–48.5)
HCV AB SERPL QL IA: NEGATIVE
HGB BLD-MCNC: 12.4 G/DL (ref 12–16)
IGG SERPL-MCNC: 484 MG/DL (ref 650–1600)
IMM GRANULOCYTES # BLD AUTO: 0.18 K/UL (ref 0–0.04)
IMM GRANULOCYTES NFR BLD AUTO: 1.7 % (ref 0–0.5)
INTERVENTRICULAR SEPTUM: 1.21 CM (ref 0.6–1.1)
LA MAJOR: 5.67 CM
LA MINOR: 5.89 CM
LA WIDTH: 4.38 CM
LACTATE SERPL-SCNC: 1.5 MMOL/L (ref 0.5–2.2)
LACTATE SERPL-SCNC: 2 MMOL/L (ref 0.5–2.2)
LACTATE SERPL-SCNC: 2.7 MMOL/L (ref 0.5–2.2)
LACTATE SERPL-SCNC: 2.9 MMOL/L (ref 0.5–2.2)
LEFT ATRIUM SIZE: 3.63 CM
LEFT ATRIUM VOLUME INDEX: 41.9 ML/M2
LEFT ATRIUM VOLUME: 78.09 CM3
LEFT INTERNAL DIMENSION IN SYSTOLE: 2.46 CM (ref 2.1–4)
LEFT VENTRICLE DIASTOLIC VOLUME INDEX: 56.57 ML/M2
LEFT VENTRICLE DIASTOLIC VOLUME: 105.3 ML
LEFT VENTRICLE MASS INDEX: 102 G/M2
LEFT VENTRICLE SYSTOLIC VOLUME INDEX: 11.5 ML/M2
LEFT VENTRICLE SYSTOLIC VOLUME: 21.44 ML
LEFT VENTRICULAR INTERNAL DIMENSION IN DIASTOLE: 4.76 CM (ref 3.5–6)
LEFT VENTRICULAR MASS: 190.17 G
LV LATERAL E/E' RATIO: 27.67 M/S
LV SEPTAL E/E' RATIO: 20.75 M/S
LYMPHOCYTES # BLD AUTO: 1.1 K/UL (ref 1–4.8)
LYMPHOCYTES NFR BLD: 10.6 % (ref 18–48)
MAGNESIUM SERPL-MCNC: 1.8 MG/DL (ref 1.6–2.6)
MCH RBC QN AUTO: 32.7 PG (ref 27–31)
MCHC RBC AUTO-ENTMCNC: 33 G/DL (ref 32–36)
MCV RBC AUTO: 99 FL (ref 82–98)
MONOCYTES # BLD AUTO: 0.7 K/UL (ref 0.3–1)
MONOCYTES NFR BLD: 6.3 % (ref 4–15)
MV PEAK A VEL: 0.71 M/S
MV PEAK E VEL: 0.83 M/S
MV STENOSIS PRESSURE HALF TIME: 88.88 MS
MV VALVE AREA P 1/2 METHOD: 2.48 CM2
NEUTROPHILS # BLD AUTO: 8.6 K/UL (ref 1.8–7.7)
NEUTROPHILS NFR BLD: 81.2 % (ref 38–73)
NRBC BLD-RTO: 0 /100 WBC
PHOSPHATE SERPL-MCNC: 4.6 MG/DL (ref 2.7–4.5)
PISA TR MAX VEL: 2.42 M/S
PLATELET # BLD AUTO: 91 K/UL (ref 150–350)
PMV BLD AUTO: 10.4 FL (ref 9.2–12.9)
POCT GLUCOSE: 114 MG/DL (ref 70–110)
POCT GLUCOSE: 116 MG/DL (ref 70–110)
POCT GLUCOSE: 123 MG/DL (ref 70–110)
POTASSIUM SERPL-SCNC: 4.5 MMOL/L (ref 3.5–5.1)
PROT SERPL-MCNC: 4.9 G/DL (ref 6–8.4)
RA PRESSURE: 8 MMHG
RBC # BLD AUTO: 3.79 M/UL (ref 4–5.4)
RV TISSUE DOPPLER FREE WALL SYSTOLIC VELOCITY 1 (APICAL 4 CHAMBER VIEW): 9.59 CM/S
SINUS: 3.5 CM
SODIUM SERPL-SCNC: 137 MMOL/L (ref 136–145)
STJ: 2.75 CM
TDI LATERAL: 0.03 M/S
TDI SEPTAL: 0.04 M/S
TDI: 0.04 M/S
TR MAX PG: 23 MMHG
TRICUSPID ANNULAR PLANE SYSTOLIC EXCURSION: 2.5 CM
TROPONIN I SERPL DL<=0.01 NG/ML-MCNC: 0.12 NG/ML (ref 0–0.03)
TSH SERPL DL<=0.005 MIU/L-ACNC: 1.2 UIU/ML (ref 0.4–4)
TV REST PULMONARY ARTERY PRESSURE: 31 MMHG
WBC # BLD AUTO: 10.56 K/UL (ref 3.9–12.7)

## 2020-06-25 PROCEDURE — 99223 PR INITIAL HOSPITAL CARE,LEVL III: ICD-10-PCS | Mod: AI,GC,, | Performed by: STUDENT IN AN ORGANIZED HEALTH CARE EDUCATION/TRAINING PROGRAM

## 2020-06-25 PROCEDURE — 36415 COLL VENOUS BLD VENIPUNCTURE: CPT

## 2020-06-25 PROCEDURE — 83605 ASSAY OF LACTIC ACID: CPT | Mod: 91

## 2020-06-25 PROCEDURE — 63600175 PHARM REV CODE 636 W HCPCS: Performed by: STUDENT IN AN ORGANIZED HEALTH CARE EDUCATION/TRAINING PROGRAM

## 2020-06-25 PROCEDURE — 99223 PR INITIAL HOSPITAL CARE,LEVL III: ICD-10-PCS | Mod: GC,,, | Performed by: INTERNAL MEDICINE

## 2020-06-25 PROCEDURE — 25000003 PHARM REV CODE 250: Performed by: STUDENT IN AN ORGANIZED HEALTH CARE EDUCATION/TRAINING PROGRAM

## 2020-06-25 PROCEDURE — 92610 EVALUATE SWALLOWING FUNCTION: CPT

## 2020-06-25 PROCEDURE — 84443 ASSAY THYROID STIM HORMONE: CPT

## 2020-06-25 PROCEDURE — 85025 COMPLETE CBC W/AUTO DIFF WBC: CPT

## 2020-06-25 PROCEDURE — 84484 ASSAY OF TROPONIN QUANT: CPT

## 2020-06-25 PROCEDURE — 99223 1ST HOSP IP/OBS HIGH 75: CPT | Mod: AI,GC,, | Performed by: STUDENT IN AN ORGANIZED HEALTH CARE EDUCATION/TRAINING PROGRAM

## 2020-06-25 PROCEDURE — 83735 ASSAY OF MAGNESIUM: CPT

## 2020-06-25 PROCEDURE — 94761 N-INVAS EAR/PLS OXIMETRY MLT: CPT

## 2020-06-25 PROCEDURE — 86235 NUCLEAR ANTIGEN ANTIBODY: CPT | Mod: 91

## 2020-06-25 PROCEDURE — 86256 FLUORESCENT ANTIBODY TITER: CPT

## 2020-06-25 PROCEDURE — 80053 COMPREHEN METABOLIC PANEL: CPT

## 2020-06-25 PROCEDURE — 82248 BILIRUBIN DIRECT: CPT

## 2020-06-25 PROCEDURE — 99223 1ST HOSP IP/OBS HIGH 75: CPT | Mod: GC,,, | Performed by: INTERNAL MEDICINE

## 2020-06-25 PROCEDURE — 87077 CULTURE AEROBIC IDENTIFY: CPT

## 2020-06-25 PROCEDURE — 97535 SELF CARE MNGMENT TRAINING: CPT

## 2020-06-25 PROCEDURE — 86038 ANTINUCLEAR ANTIBODIES: CPT

## 2020-06-25 PROCEDURE — 83605 ASSAY OF LACTIC ACID: CPT

## 2020-06-25 PROCEDURE — 87040 BLOOD CULTURE FOR BACTERIA: CPT

## 2020-06-25 PROCEDURE — 82784 ASSAY IGA/IGD/IGG/IGM EACH: CPT

## 2020-06-25 PROCEDURE — 87529 HSV DNA AMP PROBE: CPT

## 2020-06-25 PROCEDURE — 11000001 HC ACUTE MED/SURG PRIVATE ROOM

## 2020-06-25 PROCEDURE — 80074 ACUTE HEPATITIS PANEL: CPT

## 2020-06-25 PROCEDURE — 84100 ASSAY OF PHOSPHORUS: CPT

## 2020-06-25 RX ORDER — SODIUM CHLORIDE 0.9 % (FLUSH) 0.9 %
10 SYRINGE (ML) INJECTION
Status: DISCONTINUED | OUTPATIENT
Start: 2020-06-25 | End: 2020-06-29 | Stop reason: HOSPADM

## 2020-06-25 RX ORDER — HEPARIN SODIUM 5000 [USP'U]/ML
5000 INJECTION, SOLUTION INTRAVENOUS; SUBCUTANEOUS EVERY 8 HOURS
Status: DISCONTINUED | OUTPATIENT
Start: 2020-06-25 | End: 2020-06-26

## 2020-06-25 RX ORDER — IRBESARTAN 150 MG/1
150 TABLET ORAL DAILY
Status: ON HOLD | COMMUNITY
End: 2020-09-21 | Stop reason: HOSPADM

## 2020-06-25 RX ORDER — CARVEDILOL 12.5 MG/1
12.5 TABLET ORAL 2 TIMES DAILY
COMMUNITY

## 2020-06-25 RX ORDER — CLONIDINE HYDROCHLORIDE 0.1 MG/1
0.1 TABLET ORAL
Status: ON HOLD | COMMUNITY
End: 2020-09-21 | Stop reason: HOSPADM

## 2020-06-25 RX ORDER — POTASSIUM CHLORIDE 20 MEQ/1
20 TABLET, EXTENDED RELEASE ORAL DAILY
Status: ON HOLD | COMMUNITY
End: 2020-09-21 | Stop reason: HOSPADM

## 2020-06-25 RX ORDER — ACETAMINOPHEN 325 MG/1
325 TABLET ORAL EVERY 6 HOURS PRN
COMMUNITY

## 2020-06-25 RX ADMIN — PANTOPRAZOLE SODIUM 40 MG: 40 TABLET, DELAYED RELEASE ORAL at 08:06

## 2020-06-25 RX ADMIN — Medication 1250 MG: at 08:06

## 2020-06-25 RX ADMIN — SODIUM CHLORIDE, SODIUM LACTATE, POTASSIUM CHLORIDE, AND CALCIUM CHLORIDE 500 ML: .6; .31; .03; .02 INJECTION, SOLUTION INTRAVENOUS at 04:06

## 2020-06-25 RX ADMIN — PIPERACILLIN AND TAZOBACTAM 4.5 G: 4; .5 INJECTION, POWDER, LYOPHILIZED, FOR SOLUTION INTRAVENOUS; PARENTERAL at 06:06

## 2020-06-25 RX ADMIN — SODIUM CHLORIDE, SODIUM LACTATE, POTASSIUM CHLORIDE, AND CALCIUM CHLORIDE 500 ML: .6; .31; .03; .02 INJECTION, SOLUTION INTRAVENOUS at 06:06

## 2020-06-25 RX ADMIN — SODIUM CHLORIDE, SODIUM LACTATE, POTASSIUM CHLORIDE, AND CALCIUM CHLORIDE: .6; .31; .03; .02 INJECTION, SOLUTION INTRAVENOUS at 01:06

## 2020-06-25 RX ADMIN — HEPARIN SODIUM 5000 UNITS: 5000 INJECTION INTRAVENOUS; SUBCUTANEOUS at 09:06

## 2020-06-25 NOTE — PT/OT/SLP EVAL
Speech Language Pathology Evaluation  Bedside Swallow    Patient Name:  Erin Sahu   MRN:  [id number]  Admitting Diagnosis: Hypotension    Recommendations:                 General Recommendations:  Dysphagia therapy  Diet recommendations:  Dental Soft, Thin   Aspiration Precautions: Supervision with all PO advised for safety , 1 bite/sip at a time, Avoid talking while eating, Eliminate distractions, Feed only when awake/alert, Frequent oral care, HOB to 90 degrees, Meds whole 1 at a time, Small bites/sips and Strict aspiration precautions Continue to monitor for signs and symptoms of aspiration and discontinue oral feeding should you notice any of the following: watery eyes, reddened facial area, wet vocal quality, increased work of breathing, change in respiratory status, increased congestion, coughing, fever, etc.  General Precautions: Standard, aspiration, fall, respiratory  Communication strategies:  provide increased time to answer    History:     Past Medical History:   Diagnosis Date    Anxiety     Arthritis     Coronary artery disease     Diabetes mellitus type II     HHD (hypertensive heart disease)     Hyperlipidemia     Hypertension     Mitral valve disease     Pacemaker     Syncope and collapse        Past Surgical History:   Procedure Laterality Date    BACK SURGERY      BREAST SURGERY      CARDIAC PACEMAKER PLACEMENT  01/23/2009    CHOLECYSTECTOMY      CORONARY ANGIOPLASTY WITH STENT PLACEMENT  03/2001    EYE SURGERY Bilateral 06/2001    cataracts    HEMORRHOID SURGERY      HYSTERECTOMY      REPLACEMENT OF PACEMAKER GENERATOR N/A 5/1/2019    Procedure: REPLACEMENT, PACEMAKER GENERATOR;  Surgeon: Ezio Mclean MD;  Location: [facility] CATH;  Service: Cardiology;  Laterality: N/A;    ROTATOR CUFF REPAIR Right     TOTAL KNEE ARTHROPLASTY Right        Social History: Patient lives alone in home in [place]. .    Prior Intubation HX:  None this admission    Modified Barium Swallow:  "none prior at this facility    Chest X-Rays: 6/24/20: Cardiomegaly with mild pulmonary vascular congestion, suggestive of mild pulmonary edema secondary to CHF.    Prior diet: regular, thin     Occupation/hobbies/homemaking: pt reported she is Independent with ADLs at home    Subjective     SLP reviewed Pt with RN, RN cleared for PO trials  Pt presents calm  She explains, "I like my coffee and toast bread"  Patient goals: something to drink     Pain/Comfort:  · Pain Rating 1: 0/10    Objective:     Oral Musculature Evaluation  · Oral Musculature: general weakness  · Dentition: upper dentures, lower dentures  · Mucosal Quality: adequate  · Mandibular Strength and Mobility: WNL  · Oral Labial Strength and Mobility: WNL  · Lingual Strength and Mobility: WNL  · Volitional Swallow: elicited  · Voice Prior to PO Intake: mildly strained, adequate intensity    Bedside Swallow Eval:   Consistencies Assessed:  · Thin liquids cup edge sips x2, straw sips x3  · Puree : tsp bites x8  · Solids bite of marge cracker x2     Oral Phase:   · WNL    Pharyngeal Phase:   · no overt clinical signs/symptoms of aspiration    Compensatory Strategies  · slow pace, small bites    Treatment: Pt found awake in bed with nasal canula in place. HOB elevated. Daughter-in-law at bedside t/o assessment. Pt tolerated trials without overt S/S aspiration; however, risk of aspiration remains due to generalized weakness and respiratory statis. SLP educated Pt on  definition, risks and overt clinical signs of aspiration, implementation of standard aspiration precautions, diet modifications for dental soft diet due to Pts request to not have "chewy" foods and and SLP POC.  Pt and daughter verbalized understanding of all education provided and were agreement with SLP POC. No questions noted. White board updated. Patient remained upright upon SLP exit from room. RN and MD team notified of findings/recommendations following session.       Assessment: "     Erin Sahu is a 89 y.o. female with an SLP diagnosis of risk of aspiration.  She presents with no overt S/S aspiration at the bedside; however, risk of aspiration remains 2/2 respiratory status and weakness.  ST to f/u to monitor. Should you notice any of the following: watery eyes, reddened facial area, wet vocal quality, increased work of breathing, change in respiratory status, increased congestion, coughing, fever or change in alertness, please discontinue PO intake.       Goals:   Multidisciplinary Problems     SLP Goals        Problem: SLP Goal    Goal Priority Disciplines Outcome   SLP Goal     SLP Ongoing, Progressing   Description: Speech Language Pathology Goals  Goals expected to be met by 7/2/20  1. Pt will tolerate a dental soft diet with thin liquids, MOD I  2. Educate Pt and family on S/S aspiration and aspiration precautions                      Plan:     · Patient to be seen:  3 x/week   · Plan of Care expires:  07/25/20  · Plan of Care reviewed with:  patient, daughter   · SLP Follow-Up:  Yes       Discharge recommendations:  other (see comments)(pending progress, no additional ST needs anticipated following d/c from acute)       Time Tracking:     SLP Treatment Date:   06/25/20  Speech Start Time:  1220  Speech Stop Time:  1246     Speech Total Time (min):  26 min    Billable Minutes: Eval Swallow and Oral Function 10 and Seld Care/Home Management Training 16     MORENA Mcgarry, [facility]-SLP  Speech-Language Pathology  Pager: [phone]      06/25/2020

## 2020-06-25 NOTE — SUBJECTIVE & OBJECTIVE
Past Medical History:   Diagnosis Date    Anxiety     Arthritis     Coronary artery disease     Diabetes mellitus type II     HHD (hypertensive heart disease)     Hyperlipidemia     Hypertension     Mitral valve disease     Pacemaker     Syncope and collapse        Past Surgical History:   Procedure Laterality Date    BACK SURGERY      BREAST SURGERY      CARDIAC PACEMAKER PLACEMENT  01/23/2009    CHOLECYSTECTOMY      CORONARY ANGIOPLASTY WITH STENT PLACEMENT  03/2001    EYE SURGERY Bilateral 06/2001    cataracts    HEMORRHOID SURGERY      HYSTERECTOMY      REPLACEMENT OF PACEMAKER GENERATOR N/A 5/1/2019    Procedure: REPLACEMENT, PACEMAKER GENERATOR;  Surgeon: Ezio Mclean MD;  Location: [facility] CATH;  Service: Cardiology;  Laterality: N/A;    ROTATOR CUFF REPAIR Right     TOTAL KNEE ARTHROPLASTY Right        Review of patient's allergies indicates:   Allergen Reactions    Iodinated contrast media Hives       No current facility-administered medications on file prior to encounter.      Current Outpatient Medications on File Prior to Encounter   Medication Sig    alcohol swabs PadM Apply 1 each topically once daily.    amLODIPine (NORVASC) 2.5 MG tablet Take 1 tablet (2.5 mg total) by mouth once daily. (Patient taking differently: Take 5 mg by mouth once daily. )    apixaban (ELIQUIS) 2.5 mg Tab Take 1 tablet (2.5 mg total) by mouth 2 (two) times daily.    aspirin (ECOTRIN) 81 MG EC tablet Take 1 tablet (81 mg total) by mouth once daily.    biotin 5 mg Cap Take 1 capsule by mouth once daily at 6am.    carvedilol (COREG) 25 MG tablet Take 1 tablet (25 mg total) by mouth 2 (two) times daily.    diabetic supplies, miscellan. Misc TRUEPLUS SUPER THIN 28G LANCET. USE AS DIRECTED TO TEST BLOOD SUGAR TWICE DAILY.    diabetic supplies, miscellan. Misc HUMANA TRUE METRIX AIR METER. USE AS DIRECTED TO TEST BLOOD SUGAR TWICE DAILY.    diabetic supplies, miscellan. Misc HUMANA TRUE METRIX TEST  STRIPS. USE AS DIRECTED TO TEST BLOOD SUGAR TWICE DAILY    furosemide (LASIX) 20 MG tablet Take 1 tablet (20 mg total) by mouth once daily.    gabapentin (NEURONTIN) 300 MG capsule Take 1 capsule (300 mg total) by mouth 3 (three) times daily.    irbesartan (AVAPRO) 300 MG tablet Take 1 tablet (300 mg total) by mouth once daily.    LORazepam (ATIVAN) 0.5 MG tablet TAKE 1 TABLET EVERY 8 HOURS AS NEEDED    metFORMIN (GLUCOPHAGE) 500 MG tablet Take 1 tablet (500 mg total) by mouth every evening.    nitroGLYCERIN (NITROSTAT) 0.4 MG SL tablet Place 1 tablet (0.4 mg total) under the tongue every 5 (five) minutes as needed.    omega-3 fatty acids-vitamin E (FISH OIL) 1,000 mg Cap Take 1 capsule by mouth once daily.    pantoprazole (PROTONIX) 40 MG tablet Take 1 tablet (40 mg total) by mouth once daily.    pravastatin (PRAVACHOL) 20 MG tablet Take 1 tablet (20 mg total) by mouth nightly.    TRUE METRIX GLUCOSE TEST STRIP Strp USE TO TEST BLOOD SUGARS 2 TIMES DAILY.    vitamin D 185 MG Tab Take 185 mg by mouth once daily.       Family History     None        Tobacco Use    Smoking status: Never Smoker    Smokeless tobacco: Never Used   Substance and Sexual Activity    Alcohol use: No    Drug use: No    Sexual activity: Not on file     Review of Systems   Constitutional: Positive for activity change, chills and fatigue. Negative for fever.   HENT: Negative for congestion and sore throat.    Respiratory: Positive for shortness of breath. Negative for cough and chest tightness.    Cardiovascular: Negative for chest pain and leg swelling.   Gastrointestinal: Positive for nausea. Negative for abdominal pain, diarrhea and vomiting.   Genitourinary: Negative for dysuria, frequency and urgency.   Musculoskeletal: Negative for arthralgias, joint swelling and myalgias.   Skin: Negative for color change, pallor and rash.   Neurological: Negative for dizziness, light-headedness and headaches.   Psychiatric/Behavioral:  Negative for agitation and confusion.     Objective:     Vital Signs (Most Recent):  Temp: 97.7 °F (36.5 °C) (06/24/20 1836)  Pulse: 89 (06/24/20 2002)  Resp: 20 (06/24/20 1932)  BP: (!) 88/53 (06/24/20 2002)  SpO2: 96 % (06/24/20 2002) Vital Signs (24h Range):  Temp:  [97.7 °F (36.5 °C)-99.7 °F (37.6 °C)] 97.7 °F (36.5 °C)  Pulse:  [] 89  Resp:  [18-24] 20  SpO2:  [93 %-98 %] 96 %  BP: ()/(42-53) 88/53        There is no height or weight on file to calculate BMI.    Physical Exam  Constitutional:       Appearance: She is obese. She is not toxic-appearing.   HENT:      Head: Normocephalic and atraumatic.   Eyes:      Conjunctiva/sclera: Conjunctivae normal.      Pupils: Pupils are equal, round, and reactive to light.   Neck:      Musculoskeletal: Normal range of motion and neck supple.   Cardiovascular:      Rate and Rhythm: Regular rhythm. Tachycardia present.      Heart sounds: No murmur.   Pulmonary:      Effort: Pulmonary effort is normal. No respiratory distress.      Breath sounds: Normal breath sounds.      Comments: On venti mask  Abdominal:      General: Abdomen is flat. There is no distension.      Palpations: Abdomen is soft.      Tenderness: There is no abdominal tenderness. There is no guarding or rebound.   Musculoskeletal: Normal range of motion.         General: No swelling or tenderness.   Skin:     General: Skin is warm and dry.      Capillary Refill: Capillary refill takes less than 2 seconds.   Neurological:      General: No focal deficit present.      Mental Status: She is alert and oriented to person, place, and time.      Comments: Awake, alert, following commands           CRANIAL NERVES     CN III, IV, VI   Pupils are equal, round, and reactive to light.

## 2020-06-25 NOTE — ASSESSMENT & PLAN NOTE
H/o pafib: on coreg and eliquis. Hold coreg for hypotension  H/o CAD, unclear if intervention. Hold statin for transaminitis  Obtain echo, EKG with afib

## 2020-06-25 NOTE — H&P
[facility] Hospital Medicine  History & Physical    Patient Name: Erin Sahu  MRN: [id number]  Admission Date: 6/24/2020  Attending Physician: Margaret Red MD   Primary Care Provider: Junior Godinez MD    [facility] Medicine Team: Networked reference to record PCT  Carl Sneed MD     Patient information was obtained from patient, past medical records and ER records.     Subjective:     Principal Problem:Hypotension    Chief Complaint:   Chief Complaint   Patient presents with    Transfer     from [facility] for liver failure        HPI: Erin Sahu is a 89 y.o. female with past medical history of HTN, pafib (on eliquis), HLD, T2DM, CAD, who presents with weakness. Patient states she woke up this morning with chills and was extremely weak and did not want to get out of bed. She was found by her granddaughter at home extremely weak and lightheaded and was complaining of shortness of breath and nausea. Her family measured her BP at home and it was found to be 74/40. Patient has had hypotensive episodes in the past, most recently 4/2020, at which time she was in the ICU, and it was reportedly attributed to her anti-hypertensive medication (had been recently uptitrated). EMS was called and they measured , but by arrival in the ED at [facility] had SBP in the 80s. She also became hypoxic to 82% and was placed on 40% venti mask with improvement sats to 96%. She was given 600 cc IVF with minimal improvement in BP. Patient was noted to be listless and ill-appearing but awake and following commands.   Labs at [facility] ED: CR 1.12, BUN 19, AST 1626, , TB 5.1, , WBC 4.7, , normal TROP, CPK and TSH, lactate 2.7. She had a cxr done which was normal except for mild left costophrenic blunting. Her urine was reportedly normal. [place] [facility] [place].     Past Medical History:   Diagnosis Date    Anxiety     Arthritis      Coronary artery disease     Diabetes mellitus type II     HHD (hypertensive heart disease)     Hyperlipidemia     Hypertension     Mitral valve disease     Pacemaker     Syncope and collapse        Past Surgical History:   Procedure Laterality Date    BACK SURGERY      BREAST SURGERY      CARDIAC PACEMAKER PLACEMENT  01/23/2009    CHOLECYSTECTOMY      CORONARY ANGIOPLASTY WITH STENT PLACEMENT  03/2001    EYE SURGERY Bilateral 06/2001    cataracts    HEMORRHOID SURGERY      HYSTERECTOMY      REPLACEMENT OF PACEMAKER GENERATOR N/A 5/1/2019    Procedure: REPLACEMENT, PACEMAKER GENERATOR;  Surgeon: Ezio Mclean MD;  Location: [facility] CATH;  Service: Cardiology;  Laterality: N/A;    ROTATOR CUFF REPAIR Right     TOTAL KNEE ARTHROPLASTY Right        Review of patient's allergies indicates:   Allergen Reactions    Iodinated contrast media Hives       No current facility-administered medications on file prior to encounter.      Current Outpatient Medications on File Prior to Encounter   Medication Sig    alcohol swabs PadM Apply 1 each topically once daily.    amLODIPine (NORVASC) 2.5 MG tablet Take 1 tablet (2.5 mg total) by mouth once daily. (Patient taking differently: Take 5 mg by mouth once daily. )    apixaban (ELIQUIS) 2.5 mg Tab Take 1 tablet (2.5 mg total) by mouth 2 (two) times daily.    aspirin (ECOTRIN) 81 MG EC tablet Take 1 tablet (81 mg total) by mouth once daily.    biotin 5 mg Cap Take 1 capsule by mouth once daily at 6am.    carvedilol (COREG) 25 MG tablet Take 1 tablet (25 mg total) by mouth 2 (two) times daily.    diabetic supplies, miscellan. Misc TRUEPLUS SUPER THIN 28G LANCET. USE AS DIRECTED TO TEST BLOOD SUGAR TWICE DAILY.    diabetic supplies, miscellan. Misc HUMANA TRUE METRIX AIR METER. USE AS DIRECTED TO TEST BLOOD SUGAR TWICE DAILY.    diabetic supplies, miscellan. Misc HUMANA TRUE METRIX TEST STRIPS. USE AS DIRECTED TO TEST BLOOD SUGAR TWICE DAILY    furosemide  (LASIX) 20 MG tablet Take 1 tablet (20 mg total) by mouth once daily.    gabapentin (NEURONTIN) 300 MG capsule Take 1 capsule (300 mg total) by mouth 3 (three) times daily.    irbesartan (AVAPRO) 300 MG tablet Take 1 tablet (300 mg total) by mouth once daily.    LORazepam (ATIVAN) 0.5 MG tablet TAKE 1 TABLET EVERY 8 HOURS AS NEEDED    metFORMIN (GLUCOPHAGE) 500 MG tablet Take 1 tablet (500 mg total) by mouth every evening.    nitroGLYCERIN (NITROSTAT) 0.4 MG SL tablet Place 1 tablet (0.4 mg total) under the tongue every 5 (five) minutes as needed.    omega-3 fatty acids-vitamin E (FISH OIL) 1,000 mg Cap Take 1 capsule by mouth once daily.    pantoprazole (PROTONIX) 40 MG tablet Take 1 tablet (40 mg total) by mouth once daily.    pravastatin (PRAVACHOL) 20 MG tablet Take 1 tablet (20 mg total) by mouth nightly.    TRUE METRIX GLUCOSE TEST STRIP Strp USE TO TEST BLOOD SUGARS 2 TIMES DAILY.    vitamin D 185 MG Tab Take 185 mg by mouth once daily.       Family History     None        Tobacco Use    Smoking status: Never Smoker    Smokeless tobacco: Never Used   Substance and Sexual Activity    Alcohol use: No    Drug use: No    Sexual activity: Not on file     Review of Systems   Constitutional: Positive for activity change, chills and fatigue. Negative for fever.   HENT: Negative for congestion and sore throat.    Respiratory: Positive for shortness of breath. Negative for cough and chest tightness.    Cardiovascular: Negative for chest pain and leg swelling.   Gastrointestinal: Positive for nausea. Negative for abdominal pain, diarrhea and vomiting.   Genitourinary: Negative for dysuria, frequency and urgency.   Musculoskeletal: Negative for arthralgias, joint swelling and myalgias.   Skin: Negative for color change, pallor and rash.   Neurological: Negative for dizziness, light-headedness and headaches.   Psychiatric/Behavioral: Negative for agitation and confusion.     Objective:     Vital Signs (Most  Recent):  Temp: 97.7 °F (36.5 °C) (06/24/20 1836)  Pulse: 89 (06/24/20 2002)  Resp: 20 (06/24/20 1932)  BP: (!) 88/53 (06/24/20 2002)  SpO2: 96 % (06/24/20 2002) Vital Signs (24h Range):  Temp:  [97.7 °F (36.5 °C)-99.7 °F (37.6 °C)] 97.7 °F (36.5 °C)  Pulse:  [] 89  Resp:  [18-24] 20  SpO2:  [93 %-98 %] 96 %  BP: ()/(42-53) 88/53        There is no height or weight on file to calculate BMI.    Physical Exam  Constitutional:       Appearance: She is obese. She is not toxic-appearing.   HENT:      Head: Normocephalic and atraumatic.   Eyes:      Conjunctiva/sclera: Conjunctivae normal.      Pupils: Pupils are equal, round, and reactive to light.   Neck:      Musculoskeletal: Normal range of motion and neck supple.   Cardiovascular:      Rate and Rhythm: Regular rhythm. Tachycardia present.      Heart sounds: No murmur.   Pulmonary:      Effort: Pulmonary effort is normal. No respiratory distress.      Breath sounds: Normal breath sounds.      Comments: On venti mask  Abdominal:      General: Abdomen is flat. There is no distension.      Palpations: Abdomen is soft.      Tenderness: There is no abdominal tenderness. There is no guarding or rebound.   Musculoskeletal: Normal range of motion.         General: No swelling or tenderness.   Skin:     General: Skin is warm and dry.      Capillary Refill: Capillary refill takes less than 2 seconds.   Neurological:      General: No focal deficit present.      Mental Status: She is alert and oriented to person, place, and time.      Comments: Awake, alert, following commands           CRANIAL NERVES     CN III, IV, VI   Pupils are equal, round, and reactive to light.         Assessment/Plan:     * Hypotension  89 y.o. female with h/o HTN, pafib (on eliquis), HLD, T2DM, CAD, who presents with weakness, chills, shortness of breath, and nausea. Hypotensive to 70s SBP extremely weak and lightheaded and was complaining of shortness of breath and nausea. Her family  measured her BP at home and it was found to be 74/40.  - Labs at [facility] ED: CR 1.12, BUN 19, AST 1626, , TB 5.1, , WBC 4.7, , normal TROP, CPK and TSH, lactate 2.7. She had a cxr done which was normal except for mild left costophrenic blunting. Her urine was reportedly normal.   - Unclear etiology of hypotension. Sepsis - start infectious workup, vs. Medication induced vs. Cardiogenic (bedside ultrasound with good cardiac function, will obtain formal echo)  - transaminitis: shock liver (w/ severe hypotension at home) vs. Drug induced (statin) vs. Viral (no known exposures)  - COVID negative    Plan:  - obtain labs, initiate infectious workup: unclear source but given leukocytosis, tachycardia and severe hypotension will start broad spectrum antibiotics  - CT abdomen per  recommendations  - Hepatology consulted  - f/u blood cultures, UA, cxr, hep panel, trend lactate  - f/u echo    Transaminitis  See hypotension      PAF (paroxysmal atrial fibrillation)  H/o pafib: on coreg and eliquis. Hold coreg for hypotension  H/o CAD, unclear if intervention. Hold statin for transaminitis  Obtain echo, EKG with afib    Essential hypertension  Holding home anti-hypertensives for hypotension      Diabetes mellitus with neuropathy  On metformin at home, hold  Start LDSSI, accuchecks  Diabetic diet when able    VTE Risk Mitigation (From admission, onward)         Ordered     IP VTE HIGH RISK PATIENT  Once      06/24/20 2011     Place sequential compression device  Until discontinued      06/24/20 2011                   Carl Sneed MD  Department of Hospital Medicine   [facility]

## 2020-06-25 NOTE — PROGRESS NOTES
VANCOMYCIN DOSING BY PHARMACY DISCONTINUATION NOTE    Erin Sahu is a 89 y.o. female who had been consulted for vancomycin dosing.    The pharmacy consult for vancomycin dosing has been discontinued.     Vancomycin Dosing by Pharmacy Consult will sign-off. Please reconsult if necessary. Thank you for allowing us to participate in this patient's care.       Lainey Ballard, PharmD  EXT [id number]

## 2020-06-25 NOTE — NURSING
Pt arrived on unit [id number] hypotensive Doctor aware LR bolus given zosyn and mg given as well per md order assessment per flowsheet pt good historian family aware per patient pt orientated to room call light and personal items in reach bed in lowest position

## 2020-06-25 NOTE — SUBJECTIVE & OBJECTIVE
Review of Systems   Constitutional: Positive for chills and fatigue. Negative for activity change, appetite change and fever.   HENT: Negative for trouble swallowing.    Respiratory: Positive for shortness of breath. Negative for cough, choking and chest tightness.    Cardiovascular: Negative for chest pain and leg swelling.   Gastrointestinal: Positive for nausea. Negative for abdominal pain, anal bleeding, blood in stool, constipation, diarrhea and vomiting.   Genitourinary: Negative for difficulty urinating and dysuria.   Musculoskeletal: Negative for arthralgias and back pain.   Skin: Negative for color change and pallor.   Neurological: Positive for weakness. Negative for dizziness and headaches.   Psychiatric/Behavioral: Negative for agitation and confusion.       Past Medical History:   Diagnosis Date    Anxiety     Arthritis     Coronary artery disease     Diabetes mellitus type II     HHD (hypertensive heart disease)     Hyperlipidemia     Hypertension     Mitral valve disease     Pacemaker     Syncope and collapse        Past Surgical History:   Procedure Laterality Date    BACK SURGERY      BREAST SURGERY      CARDIAC PACEMAKER PLACEMENT  01/23/2009    CHOLECYSTECTOMY      CORONARY ANGIOPLASTY WITH STENT PLACEMENT  03/2001    EYE SURGERY Bilateral 06/2001    cataracts    HEMORRHOID SURGERY      HYSTERECTOMY      REPLACEMENT OF PACEMAKER GENERATOR N/A 5/1/2019    Procedure: REPLACEMENT, PACEMAKER GENERATOR;  Surgeon: Ezio Mclean MD;  Location: [facility] CATH;  Service: Cardiology;  Laterality: N/A;    ROTATOR CUFF REPAIR Right     TOTAL KNEE ARTHROPLASTY Right        Family history of liver disease: No    Review of patient's allergies indicates:   Allergen Reactions    Iodinated contrast media Hives       Tobacco Use    Smoking status: Never Smoker    Smokeless tobacco: Never Used   Substance and Sexual Activity    Alcohol use: No    Drug use: No    Sexual activity: Not on file        Medications Prior to Admission   Medication Sig Dispense Refill Last Dose    acetaminophen (TYLENOL) 325 MG tablet Take 325 mg by mouth every 6 (six) hours as needed for Pain.       apixaban (ELIQUIS) 2.5 mg Tab Take 1 tablet (2.5 mg total) by mouth 2 (two) times daily.       aspirin (ECOTRIN) 81 MG EC tablet Take 1 tablet (81 mg total) by mouth once daily. 1 Bottle 1     carvediloL (COREG) 12.5 MG tablet Take 12.5 mg by mouth 2 (two) times daily.       cloNIDine (CATAPRES) 0.1 MG tablet Take 0.1 mg by mouth as needed (hypertension).       furosemide (LASIX) 20 MG tablet Take 1 tablet (20 mg total) by mouth once daily. (Patient taking differently: Take 20 mg by mouth every other day. ) 90 tablet 1     gabapentin (NEURONTIN) 300 MG capsule Take 1 capsule (300 mg total) by mouth 3 (three) times daily. 270 capsule 1     irbesartan (AVAPRO) 150 MG tablet Take 150 mg by mouth once daily.       LORazepam (ATIVAN) 0.5 MG tablet TAKE 1 TABLET EVERY 8 HOURS AS NEEDED 90 tablet 1     pantoprazole (PROTONIX) 40 MG tablet Take 1 tablet (40 mg total) by mouth once daily. 90 tablet 1     potassium chloride SA (K-DUR,KLOR-CON) 20 MEQ tablet Take 20 mEq by mouth once daily.       pravastatin (PRAVACHOL) 20 MG tablet Take 1 tablet (20 mg total) by mouth nightly. 90 tablet 1     alcohol swabs PadM Apply 1 each topically once daily. 100 each 6     amLODIPine (NORVASC) 2.5 MG tablet Take 1 tablet (2.5 mg total) by mouth once daily. (Patient taking differently: Take 5 mg by mouth once daily. ) 90 tablet 1     diabetic supplies, miscellan. Misc TRUEPLUS SUPER THIN 28G LANCET. USE AS DIRECTED TO TEST BLOOD SUGAR TWICE DAILY. 100 each 12     diabetic supplies, miscellan. Misc HUMANA TRUE METRIX AIR METER. USE AS DIRECTED TO TEST BLOOD SUGAR TWICE DAILY. 1 each 0     diabetic supplies, miscellan. Misc [employer]MANA TRUE METRIX TEST STRIPS. USE AS DIRECTED TO TEST BLOOD SUGAR TWICE DAILY 100 each 12     metFORMIN  (GLUCOPHAGE) 500 MG tablet Take 1 tablet (500 mg total) by mouth every evening. 90 tablet 3     nitroGLYCERIN (NITROSTAT) 0.4 MG SL tablet Place 1 tablet (0.4 mg total) under the tongue every 5 (five) minutes as needed. 30 tablet 1     TRUE METRIX GLUCOSE TEST STRIP Strp USE TO TEST BLOOD SUGARS 2 TIMES DAILY. 200 strip 12        Objective:     Vital Signs (Most Recent):  Temp: 97.4 °F (36.3 °C) (06/25/20 1230)  Pulse: 67 (06/25/20 1102)  Resp: 20 (06/25/20 1102)  BP: (!) 88/60 (06/25/20 1102)  SpO2: 99 % (06/25/20 1102) Vital Signs (24h Range):  Temp:  [97.4 °F (36.3 °C)-98.8 °F (37.1 °C)] 97.4 °F (36.3 °C)  Pulse:  [] 67  Resp:  [18-24] 20  SpO2:  [91 %-99 %] 99 %  BP: ()/(42-60) 88/60     Weight: 80.7 kg (178 lb) (06/25/20 0800)  Body mass index is 30.55 kg/m².    Physical Exam  Constitutional:       General: She is not in acute distress.     Appearance: She is well-developed.   HENT:      Head: Normocephalic.   Eyes:      General: No scleral icterus.     Conjunctiva/sclera: Conjunctivae normal.   Neck:      Musculoskeletal: Normal range of motion and neck supple.   Cardiovascular:      Rate and Rhythm: Normal rate and regular rhythm.   Pulmonary:      Effort: Pulmonary effort is normal.      Breath sounds: Normal breath sounds.   Abdominal:      General: Bowel sounds are normal. There is no distension.      Palpations: Abdomen is soft. There is no mass.      Tenderness: There is no abdominal tenderness. There is no guarding or rebound.   Musculoskeletal: Normal range of motion.   Skin:     General: Skin is warm and dry.   Neurological:      Mental Status: She is alert and oriented to person, place, and time.         MELD-Na score: 22 at 6/25/2020  4:40 AM  MELD score: 22 at 6/25/2020  4:40 AM  Calculated from:  Serum Creatinine: 1.9 mg/dL at 6/25/2020  4:40 AM  Serum Sodium: 137 mmol/L at 6/25/2020  4:40 AM  Total Bilirubin: 5.2 mg/dL at 6/25/2020  4:40 AM  INR(ratio): 1.3 at 6/24/2020  7:55  PM  Age: 89 years 10 months    Significant Labs:  CBC:   Recent Labs   Lab 06/25/20 0440   WBC 10.56   RBC 3.79*   HGB 12.4   HCT 37.6   PLT 91*     BMP:   Recent Labs   Lab 06/25/20 0440         K 4.5      CO2 26   BUN 30*   CREATININE 1.9*   CALCIUM 8.1*     CMP:   Recent Labs   Lab 06/25/20 0440      CALCIUM 8.1*   ALBUMIN 2.6*   PROT 4.9*      K 4.5   CO2 26      BUN 30*   CREATININE 1.9*   ALKPHOS 168*   *   *   BILITOT 5.2*     Coagulation:   Recent Labs   Lab 06/24/20 1955   INR 1.3*       Significant Imaging:  Labs: Reviewed  US: Reviewed  CT: Reviewed

## 2020-06-25 NOTE — PLAN OF CARE
CM met with patient in room for Discharge Planning Assessment. Per patient,  wendy lives alone in a house with 3 step(s) to enter.   Per patient, she was independent with some assistance with ADLS and used DME for ambulation.  Patient will have assistance from grand-daughter upon discharge.   Discharge Planning Booklet given to patient and discussed.  All questions addressed.  CM will follow for needs.     06/25/20 1041   Discharge Assessment   Assessment Type Discharge Planning Assessment   Confirmed/corrected address and phone number on facesheet? Yes   Assessment information obtained from? Patient   Expected Length of Stay (days) 4   Communicated expected length of stay with patient/caregiver yes   Prior to hospitilization cognitive status: Alert/Oriented   Prior to hospitalization functional status: Assistive Equipment   Current cognitive status: Alert/Oriented   Current Functional Status: Assistive Equipment   Lives With alone   Able to Return to Prior Arrangements yes   Is patient able to care for self after discharge? Unable to determine at this time (comments)   Who are your caregiver(s) and their phone number(s)? [phone] and Beni Sahu [phone]   Patient's perception of discharge disposition home health   Readmission Within the Last 30 Days no previous admission in last 30 days   Patient currently being followed by outpatient case management? No   Patient currently receives any other outside agency services? Yes   Name and contact number of agency or person providing outside services [phone]   Is it the patient/care giver preference to resume care with the current outside agency? Yes   Equipment Currently Used at Home cane, straight;walker, standard   Do you have any problems affording any of your prescribed medications? No   Is the patient taking medications as prescribed? yes   Does the patient have transportation home? Yes    Transportation Anticipated family or friend will provide   Does the patient receive services at the [facility]? No   Discharge Plan A Home Health   Discharge Plan B Skilled Nursing Facility   DME Needed Upon Discharge  other (see comments)  (TBD)   Patient/Family in Agreement with Plan yes            PCP:  Junior Godinez MD        Pharmacy:    [facility] - [place], [place] - [address]  [address] [address]  Phone: [phone] Fax: [phone]    [facility] [address] [place] [id number] [facility] [address]  [id number] [facility] [address]  [facility] [address]  Phone: [phone] Fax: [phone]        Emergency Contacts:  Extended Emergency Contact Information  Primary Emergency Contact: Beni Sahu  Address: [address] [place] [address]  Home Phone: [phone]  Mobile Phone: [phone]  Relation: Grandchild      Insurance:    Payor: HU[employer] MANAGED MEDICARE / Plan: HU[employer] MEDICARE PPO / Product Type: Medicare Advantage /       06/25/2020  10:45 AM    Rena Bedoya RN, CM   Ext: [phone]

## 2020-06-25 NOTE — HPI
Erin Sahu is a 89 y.o. female with past medical history of HTN, pafib (on eliquis), HLD, T2DM, CAD, who presents with weakness. Patient states she woke up this morning with chills and was extremely weak and did not want to get out of bed. She was found by her granddaughter at home extremely weak and lightheaded and was complaining of shortness of breath and nausea. Her family measured her BP at home and it was found to be 74/40. Patient has had hypotensive episodes in the past, most recently 4/2020, at which time she was in the ICU, and it was reportedly attributed to her anti-hypertensive medication (had been recently uptitrated). EMS was called and they measured , but by arrival in the ED at [facility] had SBP in the 80s. She also became hypoxic to 82% and was placed on 40% venti mask with improvement sats to 96%. She was given 600 cc IVF with minimal improvement in BP. Patient was noted to be listless and ill-appearing but awake and following commands.   Labs at osh ED: CR 1.12, BUN 19, AST 1626, , TB 5.1, , WBC 4.7, , normal TROP, CPK and TSH, lactate 2.7. She had a cxr done which was normal except for mild left costophrenic blunting. Her urine was reportedly normal. She was transferred to [facility] for hepatology evaluation.

## 2020-06-25 NOTE — ASSESSMENT & PLAN NOTE
Patient is a 89-year-old female with past history of hypertension coronary artery disease who presented to the ED with nausea, chills.  Hepatology consulted for elevated LFTs.    Patient's presentation with nausea, chills, obstructive LFT pattern, elevated lactate and leukocytosis suggest the possibility of passed sludge/stone with possible cholangitis.  However current imaging with no changes to suggest ongoing obstruction.  LFTs could be also elevated secondary to sepsis related to UTI (positive wbc's in the urine but negative nitrates and leukocyte).  Viral and autoimmune hepatitis are possibilities but less likely.  Drug-induced liver injury seems unlikely as well.    -continue IV antibiotics and follow up cultures  -pending viral and autoimmune hepatitis panel.  -avoid hepatotoxic agents.  -trend LFTs and CBC daily. If LFTs rise again, will likely proceed with EUS +/-ERCP

## 2020-06-25 NOTE — HPI
Patient is a 89-year-old female with past medical history of hypertension, AFib on Eliquis, hyperlipidemia, coronary artery disease, diabetes mellitus, presented to the hospital with acute onset weakness yesterday morning along with chills, nausea, and shortness of breath.  Reportedly the patient was found down by her family and brought to the ED.  She was found to be hypotensive however afebrile.  On labs noted to have leukocytosis 13, creatinine 1.9 from a normal baseline, AST//680, total bilirubin 6.2, ALKP 216. Lactic acid was elevated at 3.1.  Reportedly the patient was acutely ill, she was started on IV antibiotics and IV fluids.  Cultures were obtained.    Patient today states that she is feeling much better.  Denies abdominal pain, nausea, vomiting, chills, fevers, change in bowel habits.  She denies history of liver disease.  She does endorse intermittent abdominal pain epigastric that resolves within an hour.  She did have cholecystectomy many years ago she does not recall why.  She denies history of viral hepatitis, and denies recent exposure to people with hepatitis.  She does take Tylenol daily 3 tablets per day for the past 2 months.  Denies starting any other new medications and denies taking antibiotics.  Denies family history of liver disease.    Today the patient remains mildly hypotensive.  AST//680-->497/439, total bilirubin 6.2-->5.2, ALKP 216 --> 168. INR 1.3. Leukocytosis improved.  She remains afebrile.  A CT scan the abdomen was done that showed pneumobilia with dilated common bile duct similar to an MRI done in 2018.  Ultrasound was done with pneumobilia and a CBD of 4 mm.  Of note the patient denies history of bile duct stones or ERCPs.

## 2020-06-25 NOTE — PLAN OF CARE
Problem: SLP Goal  Goal: SLP Goal  Description: Speech Language Pathology Goals  Goals expected to be met by 7/2/20  1. Pt will tolerate a dental soft diet with thin liquids, MOD I  2. Educate Pt and family on S/S aspiration and aspiration precautions     Outcome: Ongoing, Progressing     SLP Bedside Swallow Study completed.  No overt S/S aspiration with trials presented at the bedside; however, risk of aspiration remains 2/2 respiratory status and generalized weakness.  REC: Dental Soft textures with thin liquids, whole medications ok one at a time, provided strict aspiration and GERD precautions.  RN and MD team notified. ST to continue to follow.     SONAL Mcgarry., [facility]-SLP  Speech-Language Pathology  Pager: [phone]  6/25/2020

## 2020-06-25 NOTE — CONSULTS
[facility]  Hepatology  Consult Note    Patient Name: Erin Sahu  MRN: [id number]  Admission Date: 6/24/2020  Hospital Length of Stay: 1 days  Attending Provider: Oscar Mckeon MD   Primary Care Physician: Junior Godinez MD  Principal Problem:Hypotension    Inpatient consult to Hepatology  Consult performed by: Bonnie Arias MD  Consult ordered by: Carl Sneed MD        Subjective:     Transplant status: No    HPI:  Patient is a 89-year-old female with past medical history of hypertension, AFib on Eliquis, hyperlipidemia, coronary artery disease, diabetes mellitus, presented to the hospital with acute onset weakness yesterday morning along with chills, nausea, and shortness of breath.  Reportedly the patient was found down by her family and brought to the ED.  She was found to be hypotensive however afebrile.  On labs noted to have leukocytosis 13, creatinine 1.9 from a normal baseline, AST//680, total bilirubin 6.2, ALKP 216. Lactic acid was elevated at 3.1.  Reportedly the patient was acutely ill, she was started on IV antibiotics and IV fluids.  Cultures were obtained.    Patient today states that she is feeling much better.  Denies abdominal pain, nausea, vomiting, chills, fevers, change in bowel habits.  She denies history of liver disease.  She does endorse intermittent abdominal pain epigastric that resolves within an hour.  She did have cholecystectomy many years ago she does not recall why.  She denies history of viral hepatitis, and denies recent exposure to people with hepatitis.  She does take Tylenol daily 3 tablets per day for the past 2 months.  Denies starting any other new medications and denies taking antibiotics.  Denies family history of liver disease.    Today the patient remains mildly hypotensive.  AST//680-->497/439, total bilirubin 6.2-->5.2, ALKP 216 --> 168. INR 1.3. Leukocytosis improved.  She remains afebrile.  A CT scan the  abdomen was done that showed pneumobilia with dilated common bile duct similar to an MRI done in 2018.  Ultrasound was done with pneumobilia and a CBD of 4 mm.  Of note the patient denies history of bile duct stones or ERCPs.    Review of Systems   Constitutional: Positive for chills and fatigue. Negative for activity change, appetite change and fever.   HENT: Negative for trouble swallowing.    Respiratory: Positive for shortness of breath. Negative for cough, choking and chest tightness.    Cardiovascular: Negative for chest pain and leg swelling.   Gastrointestinal: Positive for nausea. Negative for abdominal pain, anal bleeding, blood in stool, constipation, diarrhea and vomiting.   Genitourinary: Negative for difficulty urinating and dysuria.   Musculoskeletal: Negative for arthralgias and back pain.   Skin: Negative for color change and pallor.   Neurological: Positive for weakness. Negative for dizziness and headaches.   Psychiatric/Behavioral: Negative for agitation and confusion.       Past Medical History:   Diagnosis Date    Anxiety     Arthritis     Coronary artery disease     Diabetes mellitus type II     HHD (hypertensive heart disease)     Hyperlipidemia     Hypertension     Mitral valve disease     Pacemaker     Syncope and collapse        Past Surgical History:   Procedure Laterality Date    BACK SURGERY      BREAST SURGERY      CARDIAC PACEMAKER PLACEMENT  01/23/2009    CHOLECYSTECTOMY      CORONARY ANGIOPLASTY WITH STENT PLACEMENT  03/2001    EYE SURGERY Bilateral 06/2001    cataracts    HEMORRHOID SURGERY      HYSTERECTOMY      REPLACEMENT OF PACEMAKER GENERATOR N/A 5/1/2019    Procedure: REPLACEMENT, PACEMAKER GENERATOR;  Surgeon: Ezio Mclean MD;  Location: [facility] CATH;  Service: Cardiology;  Laterality: N/A;    ROTATOR CUFF REPAIR Right     TOTAL KNEE ARTHROPLASTY Right        Family history of liver disease: No    Review of patient's allergies indicates:   Allergen  Reactions    Iodinated contrast media Hives       Tobacco Use    Smoking status: Never Smoker    Smokeless tobacco: Never Used   Substance and Sexual Activity    Alcohol use: No    Drug use: No    Sexual activity: Not on file       Medications Prior to Admission   Medication Sig Dispense Refill Last Dose    acetaminophen (TYLENOL) 325 MG tablet Take 325 mg by mouth every 6 (six) hours as needed for Pain.       apixaban (ELIQUIS) 2.5 mg Tab Take 1 tablet (2.5 mg total) by mouth 2 (two) times daily.       aspirin (ECOTRIN) 81 MG EC tablet Take 1 tablet (81 mg total) by mouth once daily. 1 Bottle 1     carvediloL (COREG) 12.5 MG tablet Take 12.5 mg by mouth 2 (two) times daily.       cloNIDine (CATAPRES) 0.1 MG tablet Take 0.1 mg by mouth as needed (hypertension).       furosemide (LASIX) 20 MG tablet Take 1 tablet (20 mg total) by mouth once daily. (Patient taking differently: Take 20 mg by mouth every other day. ) 90 tablet 1     gabapentin (NEURONTIN) 300 MG capsule Take 1 capsule (300 mg total) by mouth 3 (three) times daily. 270 capsule 1     irbesartan (AVAPRO) 150 MG tablet Take 150 mg by mouth once daily.       LORazepam (ATIVAN) 0.5 MG tablet TAKE 1 TABLET EVERY 8 HOURS AS NEEDED 90 tablet 1     pantoprazole (PROTONIX) 40 MG tablet Take 1 tablet (40 mg total) by mouth once daily. 90 tablet 1     potassium chloride SA (K-DUR,KLOR-CON) 20 MEQ tablet Take 20 mEq by mouth once daily.       pravastatin (PRAVACHOL) 20 MG tablet Take 1 tablet (20 mg total) by mouth nightly. 90 tablet 1     alcohol swabs PadM Apply 1 each topically once daily. 100 each 6     amLODIPine (NORVASC) 2.5 MG tablet Take 1 tablet (2.5 mg total) by mouth once daily. (Patient taking differently: Take 5 mg by mouth once daily. ) 90 tablet 1     diabetic supplies, miscellan. Misc TRUEPLUS SUPER THIN 28G LANCET. USE AS DIRECTED TO TEST BLOOD SUGAR TWICE DAILY. 100 each 12     diabetic supplies, miscellan. [facility] TRUE  METRIX AIR METER. USE AS DIRECTED TO TEST BLOOD SUGAR TWICE DAILY. 1 each 0     diabetic supplies, miscellan. Misc [employer] TRUE METRIX TEST STRIPS. USE AS DIRECTED TO TEST BLOOD SUGAR TWICE DAILY 100 each 12     metFORMIN (GLUCOPHAGE) 500 MG tablet Take 1 tablet (500 mg total) by mouth every evening. 90 tablet 3     nitroGLYCERIN (NITROSTAT) 0.4 MG SL tablet Place 1 tablet (0.4 mg total) under the tongue every 5 (five) minutes as needed. 30 tablet 1     TRUE METRIX GLUCOSE TEST STRIP Strp USE TO TEST BLOOD SUGARS 2 TIMES DAILY. 200 strip 12        Objective:     Vital Signs (Most Recent):  Temp: 97.4 °F (36.3 °C) (06/25/20 1230)  Pulse: 67 (06/25/20 1102)  Resp: 20 (06/25/20 1102)  BP: (!) 88/60 (06/25/20 1102)  SpO2: 99 % (06/25/20 1102) Vital Signs (24h Range):  Temp:  [97.4 °F (36.3 °C)-98.8 °F (37.1 °C)] 97.4 °F (36.3 °C)  Pulse:  [] 67  Resp:  [18-24] 20  SpO2:  [91 %-99 %] 99 %  BP: ()/(42-60) 88/60     Weight: 80.7 kg (178 lb) (06/25/20 0800)  Body mass index is 30.55 kg/m².    Physical Exam  Constitutional:       General: She is not in acute distress.     Appearance: She is well-developed.   HENT:      Head: Normocephalic.   Eyes:      General: No scleral icterus.     Conjunctiva/sclera: Conjunctivae normal.   Neck:      Musculoskeletal: Normal range of motion and neck supple.   Cardiovascular:      Rate and Rhythm: Normal rate and regular rhythm.   Pulmonary:      Effort: Pulmonary effort is normal.      Breath sounds: Normal breath sounds.   Abdominal:      General: Bowel sounds are normal. There is no distension.      Palpations: Abdomen is soft. There is no mass.      Tenderness: There is no abdominal tenderness. There is no guarding or rebound.   Musculoskeletal: Normal range of motion.   Skin:     General: Skin is warm and dry.   Neurological:      Mental Status: She is alert and oriented to person, place, and time.         MELD-Na score: 22 at 6/25/2020  4:40 AM  MELD score: 22 at  6/25/2020  4:40 AM  Calculated from:  Serum Creatinine: 1.9 mg/dL at 6/25/2020  4:40 AM  Serum Sodium: 137 mmol/L at 6/25/2020  4:40 AM  Total Bilirubin: 5.2 mg/dL at 6/25/2020  4:40 AM  INR(ratio): 1.3 at 6/24/2020  7:55 PM  Age: 89 years 10 months    Significant Labs:  CBC:   Recent Labs   Lab 06/25/20 0440   WBC 10.56   RBC 3.79*   HGB 12.4   HCT 37.6   PLT 91*     BMP:   Recent Labs   Lab 06/25/20 0440         K 4.5      CO2 26   BUN 30*   CREATININE 1.9*   CALCIUM 8.1*     CMP:   Recent Labs   Lab 06/25/20 0440      CALCIUM 8.1*   ALBUMIN 2.6*   PROT 4.9*      K 4.5   CO2 26      BUN 30*   CREATININE 1.9*   ALKPHOS 168*   *   *   BILITOT 5.2*     Coagulation:   Recent Labs   Lab 06/24/20 1955   INR 1.3*       Significant Imaging:  Labs: Reviewed  US: Reviewed  CT: Reviewed    Assessment/Plan:     Elevated LFTs  Patient is a 89-year-old female with past history of hypertension coronary artery disease who presented to the ED with nausea, chills.  Hepatology consulted for elevated LFTs.    Patient's presentation with nausea, chills, obstructive LFT pattern, elevated lactate and leukocytosis suggest the possibility of passed sludge/stone with possible cholangitis.  However current imaging with no changes to suggest ongoing obstruction.  LFTs could be also elevated secondary to sepsis related to UTI (positive wbc's in the urine but negative nitrates and leukocyte).  Viral and autoimmune hepatitis are possibilities but less likely.  Drug-induced liver injury seems unlikely as well.    -continue IV antibiotics and follow up cultures  -pending viral and autoimmune hepatitis panel.  -avoid hepatotoxic agents.  -trend LFTs and CBC daily. If LFTs rise again, will likely proceed with EUS +/-ERCP        Thank you for your consult. I will follow-up with patient. Please contact us if you have any additional questions.    Doni Arias MD  Hepatology  [facility]  [place]

## 2020-06-25 NOTE — PROGRESS NOTES
Pharmacokinetic Initial Assessment: IV Vancomycin    Assessment/Plan:    Initiate intravenous vancomycin with loading dose of 1.25g once with subsequent doses when random concentrations are less than 20 mcg/mL  Desired empiric serum trough concentration is 10 to 15 mcg/mL  Draw vancomycin random level on 06/26 at 0700.  Pharmacy will continue to follow and monitor vancomycin.      Please contact pharmacy at extension [phone] with any questions regarding this assessment.     Thank you for the consult,   Moe Bryson       Patient brief summary:  Erin Sahu is a 89 y.o. female initiated on antimicrobial therapy with IV Vancomycin for treatment of suspected sepsis    Drug Allergies:   Review of patient's allergies indicates:   Allergen Reactions    Iodinated contrast media Hives       Actual Body Weight:   80.9kg    Renal Function:   Estimated Creatinine Clearance: 20.3 mL/min (A) (based on SCr of 1.9 mg/dL (H)).,     Dialysis Method (if applicable):  N/A    CBC (last 72 hours):  Recent Labs   Lab Result Units 06/24/20 1955 06/25/20  0440   WBC K/uL 13.60* 10.56   Hemoglobin g/dL 13.9 12.4   Hematocrit % 41.5 37.6   Platelets K/uL 102* 91*   Gran% % 84.7*  --    Lymph% % 7.4*  --    Mono% % 6.6  --    Eosinophil% % 0.0  --    Basophil% % 0.1  --    Differential Method  Automated  --        Metabolic Panel (last 72 hours):  Recent Labs   Lab Result Units 06/24/20 1955 06/24/20 2222 06/25/20  0440   Sodium mmol/L 137  --  137   Sodium Urine Random mmol/L  --  <20*  --    Potassium mmol/L 3.9  --  4.5   Chloride mmol/L 99  --  100   CO2 mmol/L 25  --  26   Glucose mg/dL 149*  --  101   Glucose, UA   --  1+*  --    BUN, Bld mg/dL 24*  --  30*   Creatinine mg/dL 1.6*  --  1.9*   Creatinine, Random Ur mg/dL  --  277.0  --    Albumin g/dL 3.0*  --  2.6*   Total Bilirubin mg/dL 6.2*  --  5.2*   Alkaline Phosphatase U/L 214*  --  168*   AST U/L 832*  --  479*   ALT U/L 633*  --  439*   Magnesium mg/dL 1.1*  --   --     Phosphorus mg/dL 4.7*  --   --        Drug levels (last 3 results):  No results for input(s): VANCOMYCINRA, VANCOMYCINPE, VANCOMYCINTR in the last 72 hours.    Microbiologic Results:  Microbiology Results (last 7 days)     Procedure Component Value Units Date/Time    Blood culture [[id number]] Collected: 06/24/20 2000    Order Status: Completed Specimen: Blood from Peripheral, Antecubital, Right Updated: 06/25/20 0515     Blood Culture, Routine No Growth to date    Blood culture [[id number]] Collected: 06/24/20 2000    Order Status: Completed Specimen: Blood from Peripheral, Hand, Left Updated: 06/25/20 0515     Blood Culture, Routine No Growth to date    Urine culture [[id number]] Collected: 06/24/20 2222    Order Status: No result Specimen: Urine Updated: 06/24/20 2303

## 2020-06-25 NOTE — ASSESSMENT & PLAN NOTE
89 y.o. female with h/o HTN, pafib (on eliquis), HLD, T2DM, CAD, who presents with weakness, chills, shortness of breath, and nausea. Hypotensive to 70s SBP extremely weak and lightheaded and was complaining of shortness of breath and nausea. Her family measured her BP at home and it was found to be 74/40.  - Labs at [facility] ED: CR 1.12, BUN 19, AST 1626, , TB 5.1, , WBC 4.7, , normal TROP, CPK and TSH, lactate 2.7. She had a cxr done which was normal except for mild left costophrenic blunting. Her urine was reportedly normal.   - Unclear etiology of hypotension. Sepsis - start infectious workup, vs. Medication induced vs. Cardiogenic (bedside ultrasound with good cardiac function, will obtain formal echo)  - transaminitis: shock liver (w/ severe hypotension at home) vs. Drug induced (statin) vs. Viral (no known exposures)  - COVID negative    Plan:  - obtain labs, initiate infectious workup: unclear source but given leukocytosis, tachycardia and severe hypotension will start broad spectrum antibiotics  - CT abdomen per  recommendations  - Hepatology consulted  - f/u blood cultures, UA, cxr, hep panel, trend lactate  - f/u echo

## 2020-06-26 LAB
ALBUMIN SERPL BCP-MCNC: 2.7 G/DL (ref 3.5–5.2)
ALBUMIN SERPL BCP-MCNC: 2.8 G/DL (ref 3.5–5.2)
ALP SERPL-CCNC: 134 U/L (ref 55–135)
ALT SERPL W/O P-5'-P-CCNC: 274 U/L (ref 10–44)
ANION GAP SERPL CALC-SCNC: 11 MMOL/L (ref 8–16)
ANION GAP SERPL CALC-SCNC: 13 MMOL/L (ref 8–16)
ANISOCYTOSIS BLD QL SMEAR: SLIGHT
AST SERPL-CCNC: 180 U/L (ref 10–40)
BACTERIA UR CULT: NO GROWTH
BASOPHILS # BLD AUTO: 0.02 K/UL (ref 0–0.2)
BASOPHILS NFR BLD: 0.2 % (ref 0–1.9)
BILIRUB SERPL-MCNC: 2.8 MG/DL (ref 0.1–1)
BUN SERPL-MCNC: 38 MG/DL (ref 8–23)
BUN SERPL-MCNC: 40 MG/DL (ref 8–23)
CALCIUM SERPL-MCNC: 8.5 MG/DL (ref 8.7–10.5)
CALCIUM SERPL-MCNC: 8.8 MG/DL (ref 8.7–10.5)
CHLORIDE SERPL-SCNC: 100 MMOL/L (ref 95–110)
CHLORIDE SERPL-SCNC: 97 MMOL/L (ref 95–110)
CO2 SERPL-SCNC: 25 MMOL/L (ref 23–29)
CO2 SERPL-SCNC: 27 MMOL/L (ref 23–29)
CREAT SERPL-MCNC: 2.4 MG/DL (ref 0.5–1.4)
CREAT SERPL-MCNC: 2.5 MG/DL (ref 0.5–1.4)
DIFFERENTIAL METHOD: ABNORMAL
EOSINOPHIL # BLD AUTO: 0.2 K/UL (ref 0–0.5)
EOSINOPHIL NFR BLD: 2.5 % (ref 0–8)
ERYTHROCYTE [DISTWIDTH] IN BLOOD BY AUTOMATED COUNT: 13.8 % (ref 11.5–14.5)
EST. GFR  (AFRICAN AMERICAN): 18.9 ML/MIN/1.73 M^2
EST. GFR  (AFRICAN AMERICAN): 19.9 ML/MIN/1.73 M^2
EST. GFR  (NON AFRICAN AMERICAN): 16.4 ML/MIN/1.73 M^2
EST. GFR  (NON AFRICAN AMERICAN): 17.3 ML/MIN/1.73 M^2
ESTIMATED AVG GLUCOSE: 137 MG/DL (ref 68–131)
GLUCOSE SERPL-MCNC: 73 MG/DL (ref 70–110)
GLUCOSE SERPL-MCNC: 92 MG/DL (ref 70–110)
GRAM STN SPEC: NORMAL
GRAM STN SPEC: NORMAL
HBA1C MFR BLD HPLC: 6.4 % (ref 4–5.6)
HCT VFR BLD AUTO: 37.9 % (ref 37–48.5)
HGB BLD-MCNC: 12.7 G/DL (ref 12–16)
HYPOCHROMIA BLD QL SMEAR: ABNORMAL
IMM GRANULOCYTES # BLD AUTO: 0.15 K/UL (ref 0–0.04)
IMM GRANULOCYTES NFR BLD AUTO: 1.8 % (ref 0–0.5)
LYMPHOCYTES # BLD AUTO: 0.9 K/UL (ref 1–4.8)
LYMPHOCYTES NFR BLD: 10.1 % (ref 18–48)
MAGNESIUM SERPL-MCNC: 1.9 MG/DL (ref 1.6–2.6)
MCH RBC QN AUTO: 32.5 PG (ref 27–31)
MCHC RBC AUTO-ENTMCNC: 33.5 G/DL (ref 32–36)
MCV RBC AUTO: 97 FL (ref 82–98)
MITOCHONDRIA AB TITR SER IF: NORMAL {TITER}
MONOCYTES # BLD AUTO: 0.5 K/UL (ref 0.3–1)
MONOCYTES NFR BLD: 5.3 % (ref 4–15)
NEUTROPHILS # BLD AUTO: 6.8 K/UL (ref 1.8–7.7)
NEUTROPHILS NFR BLD: 80.1 % (ref 38–73)
NRBC BLD-RTO: 0 /100 WBC
OVALOCYTES BLD QL SMEAR: ABNORMAL
PHOSPHATE SERPL-MCNC: 3.8 MG/DL (ref 2.7–4.5)
PLATELET # BLD AUTO: 87 K/UL (ref 150–350)
PMV BLD AUTO: 11.2 FL (ref 9.2–12.9)
POCT GLUCOSE: 156 MG/DL (ref 70–110)
POCT GLUCOSE: 82 MG/DL (ref 70–110)
POCT GLUCOSE: 94 MG/DL (ref 70–110)
POIKILOCYTOSIS BLD QL SMEAR: SLIGHT
POLYCHROMASIA BLD QL SMEAR: ABNORMAL
POTASSIUM SERPL-SCNC: 3.8 MMOL/L (ref 3.5–5.1)
POTASSIUM SERPL-SCNC: 4.1 MMOL/L (ref 3.5–5.1)
PROT SERPL-MCNC: 5.3 G/DL (ref 6–8.4)
RBC # BLD AUTO: 3.91 M/UL (ref 4–5.4)
SMOOTH MUSCLE AB TITR SER IF: NORMAL {TITER}
SODIUM SERPL-SCNC: 135 MMOL/L (ref 136–145)
SODIUM SERPL-SCNC: 138 MMOL/L (ref 136–145)
VANCOMYCIN SERPL-MCNC: 11.3 UG/ML
WBC # BLD AUTO: 8.49 K/UL (ref 3.9–12.7)

## 2020-06-26 PROCEDURE — 80053 COMPREHEN METABOLIC PANEL: CPT

## 2020-06-26 PROCEDURE — 11000001 HC ACUTE MED/SURG PRIVATE ROOM

## 2020-06-26 PROCEDURE — 93010 EKG 12-LEAD: ICD-10-PCS | Mod: ,,, | Performed by: INTERNAL MEDICINE

## 2020-06-26 PROCEDURE — 80202 ASSAY OF VANCOMYCIN: CPT

## 2020-06-26 PROCEDURE — 63600175 PHARM REV CODE 636 W HCPCS: Performed by: STUDENT IN AN ORGANIZED HEALTH CARE EDUCATION/TRAINING PROGRAM

## 2020-06-26 PROCEDURE — 99233 PR SUBSEQUENT HOSPITAL CARE,LEVL III: ICD-10-PCS | Mod: GC,,, | Performed by: STUDENT IN AN ORGANIZED HEALTH CARE EDUCATION/TRAINING PROGRAM

## 2020-06-26 PROCEDURE — 93005 ELECTROCARDIOGRAM TRACING: CPT

## 2020-06-26 PROCEDURE — 99233 SBSQ HOSP IP/OBS HIGH 50: CPT | Mod: GC,,, | Performed by: STUDENT IN AN ORGANIZED HEALTH CARE EDUCATION/TRAINING PROGRAM

## 2020-06-26 PROCEDURE — 25000003 PHARM REV CODE 250: Performed by: STUDENT IN AN ORGANIZED HEALTH CARE EDUCATION/TRAINING PROGRAM

## 2020-06-26 PROCEDURE — 85025 COMPLETE CBC W/AUTO DIFF WBC: CPT

## 2020-06-26 PROCEDURE — 83735 ASSAY OF MAGNESIUM: CPT

## 2020-06-26 PROCEDURE — 83036 HEMOGLOBIN GLYCOSYLATED A1C: CPT

## 2020-06-26 PROCEDURE — 36415 COLL VENOUS BLD VENIPUNCTURE: CPT

## 2020-06-26 PROCEDURE — 80069 RENAL FUNCTION PANEL: CPT

## 2020-06-26 PROCEDURE — 87205 SMEAR GRAM STAIN: CPT

## 2020-06-26 PROCEDURE — 93010 ELECTROCARDIOGRAM REPORT: CPT | Mod: ,,, | Performed by: INTERNAL MEDICINE

## 2020-06-26 PROCEDURE — 97535 SELF CARE MNGMENT TRAINING: CPT

## 2020-06-26 RX ORDER — HEPARIN SODIUM 5000 [USP'U]/ML
5000 INJECTION, SOLUTION INTRAVENOUS; SUBCUTANEOUS EVERY 8 HOURS
Status: DISCONTINUED | OUTPATIENT
Start: 2020-06-26 | End: 2020-06-28

## 2020-06-26 RX ORDER — LORAZEPAM 0.5 MG/1
0.5 TABLET ORAL EVERY 8 HOURS PRN
Status: DISCONTINUED | OUTPATIENT
Start: 2020-06-26 | End: 2020-06-29 | Stop reason: HOSPADM

## 2020-06-26 RX ADMIN — PIPERACILLIN AND TAZOBACTAM 4.5 G: 4; .5 INJECTION, POWDER, FOR SOLUTION INTRAVENOUS at 05:06

## 2020-06-26 RX ADMIN — HEPARIN SODIUM 5000 UNITS: 5000 INJECTION INTRAVENOUS; SUBCUTANEOUS at 05:06

## 2020-06-26 RX ADMIN — HEPARIN SODIUM 5000 UNITS: 5000 INJECTION INTRAVENOUS; SUBCUTANEOUS at 09:06

## 2020-06-26 RX ADMIN — APIXABAN 2.5 MG: 2.5 TABLET, FILM COATED ORAL at 12:06

## 2020-06-26 RX ADMIN — PANTOPRAZOLE SODIUM 40 MG: 40 TABLET, DELAYED RELEASE ORAL at 09:06

## 2020-06-26 NOTE — PT/OT/SLP PROGRESS
"Speech Language Pathology Treatment    Patient Name:  Erin Sahu   MRN:  [id number]  Admitting Diagnosis: Hypotension    Recommendations:                 General Recommendations:  Dysphagia therapy  Diet recommendations:  Dental Soft, Thin   Aspiration Precautions: Supervision with all PO advised for safety , 1 bite/sip at a time, Avoid talking while eating, Eliminate distractions, Feed only when awake/alert, Frequent oral care, HOB to 90 degrees, Meds whole 1 at a time, Small bites/sips and Strict aspiration precautions Continue to monitor for signs and symptoms of aspiration and discontinue oral feeding should you notice any of the following: watery eyes, reddened facial area, wet vocal quality, increased work of breathing, change in respiratory status, increased congestion, coughing, fever, etc.  General Precautions: Standard, aspiration, fall, respiratory  Communication strategies:  provide increased time to answer    Subjective     SLP reviewed Pt with RN , RN confirmed PT NPO upon initial attempt  Pt presents calm  She explains, "I am doing fine"  Granddaughter asks, "When will I know when she'll be expected to discharge?"     Pain/Comfort:  · Pain Rating 1: 0/10    Objective:     Has the patient been evaluated by SLP for swallowing?   Yes  Keep patient NPO? No   Current Respiratory Status: room air      Pt seen for ongoign monitoring of tolerance of diet. Upon initial attempt, Pt found awake in bed with call light in reach. Pt reported she had not eaten anything as RN had informed her she was NPO. RN explained call in to MD to inquire about clearance for PO.  Her voice was clear with adequate intensity on room air. She was alert and attentive. Pt denied difficulty with swallowing and explained she did not have an appetite. She was educated on definition, risks and overt clinical signs of aspiration, implementation of standard aspiration precautions, diet modifications for dental soft diet and SLP POC.  No " questions noted. SLP explained she would re-attempt later service day.    Upon later attempt, Pt awake in bed with granddaughter at bedside. Pt explained she was waiting on meal tray and did not care for anything to eat or drink upon SLP attempt. SLP reviewed ongoing SLP POC and aspiration precautions with Granddaughter and Pt. No questions noted. White board current. Granddaughter expressed eagerness to talk with MD team about discharge plans. RN and MD team notified of granddaughter's questions.     Assessment:     Erin Sahu is a 89 y.o. female with an SLP diagnosis of risk of aspiration 2/2 generalized weakness. ST to follow up to monitor to ensure tolerance of diet. No additional ST needs anticipated following d/c from acute.     Goals:   Multidisciplinary Problems     SLP Goals        Problem: SLP Goal    Goal Priority Disciplines Outcome   SLP Goal     SLP Ongoing, Progressing   Description: Speech Language Pathology Goals  Goals expected to be met by 7/2/20  1. Pt will tolerate a dental soft diet with thin liquids, MOD I  2. Educate Pt and family on S/S aspiration and aspiration precautions                      Plan:     · Patient to be seen:  3 x/week   · Plan of Care expires:  07/25/20  · Plan of Care reviewed with:  patient, grandchild(amando)   · SLP Follow-Up:  Yes       Discharge recommendations:  other (see comments)(pending progress, no additional ST needs anticipated following d/c from acute)     Time Tracking:     SLP Treatment Date:   06/26/20  Speech Start Time:  1012/ 12:34  Speech Stop Time:  1020   12:38  Speech Total Time (min):  8 min/4 min    Billable Minutes: [facility]/Home Management Training 8    MORENA Mcgarry, [facility]-SLP  Speech-Language Pathology  Pager: [phone]    06/26/2020

## 2020-06-26 NOTE — ASSESSMENT & PLAN NOTE
Likely 2/2 sepsis 2/2  choledocholithiasis vs bile duct sludge vs cholangitis    Improving.  AST: 832>479>180   ALT: 633>439>274       Alk Phos: 214>168>134  Tbili: 6.2>5.2>2.8    -Continue IV Zosyn  -F/u MRCP  -Hold eliquis for possible ERCP

## 2020-06-26 NOTE — NURSING
Straight cath ordered per MD Sawyer with [facility] 5. Placed and drained 400 mLs of clear yellow urine. Urine collected and sent to lab for gram stain.     Pt unable to void spontaneously, bladder scanned 415 mLs, paged on call [facility] 5.

## 2020-06-26 NOTE — PLAN OF CARE
Patient had a critical lab today, gram neg cocci, further orders given. No urine output, bladder scan revealed less that 100 ml.

## 2020-06-26 NOTE — ASSESSMENT & PLAN NOTE
Cr: 1.6>1.9>2.4. DINO likely 2/2 sepsis    IV fluids  CBC daily  F/u renal panel  Will continue to monitor

## 2020-06-26 NOTE — TREATMENT PLAN
Hepatology Treatment Plan    Erin Sahu is a 89 y.o. female admitted to hospital 6/24/2020 (Hospital Day: 3) due to Hypotension.     Interval History  LFTs continue to improve.  Patient is afebrile  Blood pressure improved.  Remains hemodynamically stable otherwise.  Blood cultures with Gram-negative rods.    Objective  Temp:  [97.5 °F (36.4 °C)-98.2 °F (36.8 °C)] 97.6 °F (36.4 °C) (06/26 0721)  Pulse:  [61-74] 67 (06/26 1127)  BP: ()/(55-65) 132/62 (06/26 0721)  Resp:  [14-20] 14 (06/26 0721)  SpO2:  [95 %-99 %] 98 % (06/26 0721)      Laboratory    Lab Results   Component Value Date    WBC 8.49 06/26/2020    HGB 12.7 06/26/2020    HCT 37.9 06/26/2020    MCV 97 06/26/2020    PLT 87 (L) 06/26/2020       Lab Results   Component Value Date     06/26/2020    K 4.1 06/26/2020     06/26/2020    CO2 25 06/26/2020    BUN 40 (H) 06/26/2020    CREATININE 2.5 (H) 06/26/2020    CALCIUM 8.8 06/26/2020       Lab Results   Component Value Date    ALBUMIN 2.8 (L) 06/26/2020     (H) 06/26/2020     (H) 06/26/2020    ALKPHOS 134 06/26/2020    BILITOT 2.8 (H) 06/26/2020       Lab Results   Component Value Date    INR 1.3 (H) 06/24/2020       MELD-Na score: 22 at 6/26/2020 12:22 PM  MELD score: 22 at 6/26/2020 12:22 PM  Calculated from:  Serum Creatinine: 2.5 mg/dL at 6/26/2020 12:22 PM  Serum Sodium: 138 mmol/L (Rounded to 137 mmol/L) at 6/26/2020 12:22 PM  Total Bilirubin: 2.8 mg/dL at 6/26/2020  4:52 AM  INR(ratio): 1.3 at 6/24/2020  7:55 PM  Age: 89 years 10 months    Plan  Given improvement of LFTs and GNR in the blood suspect elevation of LFTs likely due to sepsis secondary to a passed choledocholithiasis vs bile duct sludge.   However, would obtain an MRCP to evaluate for retained stones/biliary sludge.   Please continue IV abx and follow up cultures.  Trend daily CBC.   Hold Eliquis in case ERCP is indicated.  - please obtain daily CBC, BMP, LFT, INR  - Plan of care was discussed with primary  team    Thank you for involving us in the care of Erin Sahu. Please call with any additional concerns or questions.    Bonnie Arias MD  Gastroenterology Fellow

## 2020-06-26 NOTE — HOSPITAL COURSE
Patient was admitted to hospital medicine for further management. She was started on IV Vanc/Zosyn along with IV fluids. CT abdomen/pelvis and US abdomen was unremarkable. Hepatology was consulted given elevated LFTs and believe presentation is correlated with possibility of passed sludge/stone with possible cholangitis. Blood cx + GNRs. Switched Zosyn to Rocephin as E coli and Proteus sensitive to rocephin. Vitals, labs, and symptoms continued to improve.  Hepatology recommending EUS for possible ERCP, which patient declined given age, improvement in symptoms, and labs. Patient received a midline and will be discharged home with IV Cefriaxone for a total 14 day treatment for GNR bacteremia (estimated end date: 7/8/2020). Patient will follow up with PCP in 1 week with repeat CBC/labs.

## 2020-06-26 NOTE — SUBJECTIVE & OBJECTIVE
Interval History:   NAEON. Patient denies any pain. No N/V/F/C, no SOB. Creatinine uptrending (1.6>1.9>2.4), LFTs improving.     Review of Systems   Constitutional: Positive for fatigue. Negative for appetite change, chills, diaphoresis and fever.   HENT: Negative for congestion and trouble swallowing.    Eyes: Negative for pain and visual disturbance.   Respiratory: Negative for cough, chest tightness, shortness of breath and wheezing.    Cardiovascular: Negative for chest pain, palpitations and leg swelling.   Gastrointestinal: Negative for abdominal pain, diarrhea, nausea and vomiting.   Genitourinary: Positive for decreased urine volume and difficulty urinating.   Musculoskeletal: Negative for arthralgias and myalgias.   Skin: Negative for wound.   Neurological: Positive for weakness. Negative for dizziness, light-headedness, numbness and headaches.   Psychiatric/Behavioral: Negative for confusion.     Objective:     Vital Signs (Most Recent):  Temp: 97.6 °F (36.4 °C) (06/26/20 0721)  Pulse: 67 (06/26/20 1127)  Resp: 14 (06/26/20 0721)  BP: 132/62 (06/26/20 0721)  SpO2: 98 % (06/26/20 0721) Vital Signs (24h Range):  Temp:  [97.5 °F (36.4 °C)-98.2 °F (36.8 °C)] 97.6 °F (36.4 °C)  Pulse:  [61-74] 67  Resp:  [14-20] 14  SpO2:  [95 %-99 %] 98 %  BP: ()/(55-65) 132/62     Weight: 80.7 kg (178 lb)  Body mass index is 30.55 kg/m².    Intake/Output Summary (Last 24 hours) at 6/26/2020 1254  Last data filed at 6/25/2020 1900  Gross per 24 hour   Intake 200 ml   Output --   Net 200 ml      Physical Exam  Constitutional:       Appearance: She is obese. She is not toxic-appearing.   HENT:      Head: Normocephalic and atraumatic.   Eyes:      Conjunctiva/sclera: Conjunctivae normal.      Pupils: Pupils are equal, round, and reactive to light.   Neck:      Musculoskeletal: Normal range of motion and neck supple.   Cardiovascular:      Rate and Rhythm: Normal rate. Rhythm irregular.      Heart sounds: No murmur.    Pulmonary:      Effort: Pulmonary effort is normal. No respiratory distress.      Breath sounds: Normal breath sounds. No wheezing.      Comments: On RA  Abdominal:      General: Abdomen is flat. There is no distension.      Palpations: Abdomen is soft.      Tenderness: There is no abdominal tenderness. There is no guarding or rebound.   Musculoskeletal: Normal range of motion.         General: No swelling or tenderness.   Skin:     General: Skin is warm and dry.      Capillary Refill: Capillary refill takes less than 2 seconds.   Neurological:      General: No focal deficit present.      Mental Status: She is alert and oriented to person, place, and time.      Comments: A&Ox4   Psychiatric:         Mood and Affect: Mood normal.         Behavior: Behavior normal.         Thought Content: Thought content normal.         Judgment: Judgment normal.         Significant Labs:   CBC:   Recent Labs   Lab 06/24/20 1955 06/25/20 0440 06/26/20 0452   WBC 13.60* 10.56 8.49   HGB 13.9 12.4 12.7   HCT 41.5 37.6 37.9   * 91* 87*     CMP:   Recent Labs   Lab 06/24/20 1955 06/25/20 0440 06/26/20  0452    137 135*   K 3.9 4.5 3.8   CL 99 100 97   CO2 25 26 27   * 101 73   BUN 24* 30* 38*   CREATININE 1.6* 1.9* 2.4*   CALCIUM 8.6* 8.1* 8.5*   PROT 5.6* 4.9* 5.3*   ALBUMIN 3.0* 2.6* 2.7*   BILITOT 6.2* 5.2* 2.8*   ALKPHOS 214* 168* 134   * 479* 180*   * 439* 274*   ANIONGAP 13 11 11   EGFRNONAA 28.2* 22.9* 17.3*       Significant Imaging: I have reviewed and interpreted all pertinent imaging results/findings within the past 24 hours.

## 2020-06-26 NOTE — ASSESSMENT & PLAN NOTE
89 y.o. female with h/o HTN, pafib (on eliquis), HLD, T2DM, CAD, who presents with weakness, chills, shortness of breath, and nausea. Hypotensive to 70s SBP extremely weak and lightheaded and was complaining of shortness of breath and nausea. Her family measured her BP at home and it was found to be 74/40.  - Labs at [facility] ED: CR 1.12, BUN 19, AST 1626, , TB 5.1, , WBC 4.7, , normal TROP, CPK and TSH, lactate 2.7. She had a cxr done which was normal except for mild left costophrenic blunting. Her urine was reportedly normal.   - Etiology of hypotension: Likely Sepsis vs. Medication induced vs. Cardiogenic   - transaminitis: shock liver (w/ severe hypotension at home) vs. Drug induced (statin) vs. Viral (no known exposures)  - COVID negative    Plan:  -Blood cx with GNRs.   -On IV Zosyn with improvement of leukocytosis, LFTs, BP   - CT/US abdomen unremarkable  - Hepatology consulted and believe presentation is 2/2 passed choledocholithiasis vs bile duct sludge.   -Urine cx negative. Hep panel negative  -f/u MRCP  -ECHO 6/25: EF 60% with grade II diastolic dysfunction.

## 2020-06-26 NOTE — ASSESSMENT & PLAN NOTE
H/o pafib: on coreg and eliquis. Hold coreg for hypotension. Hold eliquis for possible ERCP.  H/o CAD, unclear if intervention. Hold statin for transaminitis

## 2020-06-26 NOTE — PLAN OF CARE
06/26/20 0857   Post-Acute Status   Post-Acute Authorization Home Health   Home Health Status Awaiting Internal Medical Clearance

## 2020-06-26 NOTE — PLAN OF CARE
Problem: SLP Goal  Goal: SLP Goal  Description: Speech Language Pathology Goals  Goals expected to be met by 7/2/20  1. Pt will tolerate a dental soft diet with thin liquids, MOD I  2. Educate Pt and family on S/S aspiration and aspiration precautions     Outcome: Ongoing, Progressing     Pt politely declined PO trials with ST today. ST to continue to follow. Ongoing education on SLP role, aspiration precautions and diet recommendations provided to Pt and Granddaughter at bedside. Granddaughter with questions re: discharge timeline, MD team and RN aware.     SONAL Mcgarry., [facility]-SLP  Speech-Language Pathology  Pager: [phone]  6/26/2020

## 2020-06-26 NOTE — NURSING
Pt AAOx4; Plan of care reviewed with patient; verbalized understanding. Pt NPO. Medications reviewed and administered as ordered. Rounding for safety and patient care per policy.   Safety precautions maintained. DME at bedside.  Call light within reach, bed wheels locked, bed in lowest position, side rails ^x2, safety maintained. NADN, Will continue monitor.

## 2020-06-27 PROBLEM — K83.09 ACUTE CHOLANGITIS: Status: ACTIVE | Noted: 2020-06-24

## 2020-06-27 PROBLEM — R65.20 SEPSIS DUE TO GRAM-NEGATIVE ORGANISM WITH ACUTE RENAL FAILURE: Status: ACTIVE | Noted: 2020-06-27

## 2020-06-27 PROBLEM — N17.9 SEPSIS DUE TO GRAM-NEGATIVE ORGANISM WITH ACUTE RENAL FAILURE: Status: ACTIVE | Noted: 2020-06-27

## 2020-06-27 PROBLEM — Z75.8 DISCHARGE PLANNING ISSUES: Status: ACTIVE | Noted: 2020-06-27

## 2020-06-27 PROBLEM — R53.81 DEBILITY: Status: ACTIVE | Noted: 2020-06-27

## 2020-06-27 PROBLEM — A41.50 SEPSIS DUE TO GRAM-NEGATIVE ORGANISM WITH ACUTE RENAL FAILURE: Status: ACTIVE | Noted: 2020-06-27

## 2020-06-27 LAB
ALBUMIN SERPL BCP-MCNC: 2.9 G/DL (ref 3.5–5.2)
ALP SERPL-CCNC: 126 U/L (ref 55–135)
ALT SERPL W/O P-5'-P-CCNC: 196 U/L (ref 10–44)
ANION GAP SERPL CALC-SCNC: 13 MMOL/L (ref 8–16)
AST SERPL-CCNC: 83 U/L (ref 10–40)
BACTERIA BLD CULT: ABNORMAL
BASOPHILS # BLD AUTO: 0.02 K/UL (ref 0–0.2)
BASOPHILS NFR BLD: 0.3 % (ref 0–1.9)
BILIRUB SERPL-MCNC: 1.8 MG/DL (ref 0.1–1)
BUN SERPL-MCNC: 37 MG/DL (ref 8–23)
CALCIUM SERPL-MCNC: 8.9 MG/DL (ref 8.7–10.5)
CHLORIDE SERPL-SCNC: 100 MMOL/L (ref 95–110)
CMV DNA SERPL NAA+PROBE-ACNC: NORMAL IU/ML
CO2 SERPL-SCNC: 24 MMOL/L (ref 23–29)
CREAT SERPL-MCNC: 2.2 MG/DL (ref 0.5–1.4)
DIFFERENTIAL METHOD: ABNORMAL
EOSINOPHIL # BLD AUTO: 0.3 K/UL (ref 0–0.5)
EOSINOPHIL NFR BLD: 4.2 % (ref 0–8)
ERYTHROCYTE [DISTWIDTH] IN BLOOD BY AUTOMATED COUNT: 13.4 % (ref 11.5–14.5)
EST. GFR  (AFRICAN AMERICAN): 22.1 ML/MIN/1.73 M^2
EST. GFR  (NON AFRICAN AMERICAN): 19.2 ML/MIN/1.73 M^2
GLUCOSE SERPL-MCNC: 88 MG/DL (ref 70–110)
HCT VFR BLD AUTO: 37.9 % (ref 37–48.5)
HGB BLD-MCNC: 13.1 G/DL (ref 12–16)
HSV-1 DNA BY PCR: NEGATIVE
HSV-2 DNA BY PCR: NEGATIVE
IMM GRANULOCYTES # BLD AUTO: 0.05 K/UL (ref 0–0.04)
IMM GRANULOCYTES NFR BLD AUTO: 0.7 % (ref 0–0.5)
INR PPP: 1 (ref 0.8–1.2)
LYMPHOCYTES # BLD AUTO: 1.1 K/UL (ref 1–4.8)
LYMPHOCYTES NFR BLD: 15.2 % (ref 18–48)
MCH RBC QN AUTO: 33.2 PG (ref 27–31)
MCHC RBC AUTO-ENTMCNC: 34.6 G/DL (ref 32–36)
MCV RBC AUTO: 96 FL (ref 82–98)
MONOCYTES # BLD AUTO: 0.3 K/UL (ref 0.3–1)
MONOCYTES NFR BLD: 4.3 % (ref 4–15)
NEUTROPHILS # BLD AUTO: 5.2 K/UL (ref 1.8–7.7)
NEUTROPHILS NFR BLD: 75.3 % (ref 38–73)
NRBC BLD-RTO: 0 /100 WBC
PHOSPHATE SERPL-MCNC: 3.2 MG/DL (ref 2.7–4.5)
PLATELET # BLD AUTO: 110 K/UL (ref 150–350)
PMV BLD AUTO: 10.8 FL (ref 9.2–12.9)
POCT GLUCOSE: 102 MG/DL (ref 70–110)
POCT GLUCOSE: 104 MG/DL (ref 70–110)
POCT GLUCOSE: 86 MG/DL (ref 70–110)
POCT GLUCOSE: 91 MG/DL (ref 70–110)
POCT GLUCOSE: 92 MG/DL (ref 70–110)
POTASSIUM SERPL-SCNC: 4 MMOL/L (ref 3.5–5.1)
PROT SERPL-MCNC: 5.6 G/DL (ref 6–8.4)
PROTHROMBIN TIME: 10.7 SEC (ref 9–12.5)
RBC # BLD AUTO: 3.95 M/UL (ref 4–5.4)
SODIUM SERPL-SCNC: 137 MMOL/L (ref 136–145)
WBC # BLD AUTO: 6.96 K/UL (ref 3.9–12.7)

## 2020-06-27 PROCEDURE — 63600175 PHARM REV CODE 636 W HCPCS: Performed by: STUDENT IN AN ORGANIZED HEALTH CARE EDUCATION/TRAINING PROGRAM

## 2020-06-27 PROCEDURE — 97530 THERAPEUTIC ACTIVITIES: CPT

## 2020-06-27 PROCEDURE — 36415 COLL VENOUS BLD VENIPUNCTURE: CPT

## 2020-06-27 PROCEDURE — 97165 OT EVAL LOW COMPLEX 30 MIN: CPT

## 2020-06-27 PROCEDURE — 99233 PR SUBSEQUENT HOSPITAL CARE,LEVL III: ICD-10-PCS | Mod: GC,,, | Performed by: HOSPITALIST

## 2020-06-27 PROCEDURE — 85025 COMPLETE CBC W/AUTO DIFF WBC: CPT

## 2020-06-27 PROCEDURE — 94761 N-INVAS EAR/PLS OXIMETRY MLT: CPT

## 2020-06-27 PROCEDURE — 99233 SBSQ HOSP IP/OBS HIGH 50: CPT | Mod: GC,,, | Performed by: HOSPITALIST

## 2020-06-27 PROCEDURE — 97161 PT EVAL LOW COMPLEX 20 MIN: CPT

## 2020-06-27 PROCEDURE — 25000003 PHARM REV CODE 250: Performed by: STUDENT IN AN ORGANIZED HEALTH CARE EDUCATION/TRAINING PROGRAM

## 2020-06-27 PROCEDURE — 85610 PROTHROMBIN TIME: CPT

## 2020-06-27 PROCEDURE — 11000001 HC ACUTE MED/SURG PRIVATE ROOM

## 2020-06-27 PROCEDURE — 84100 ASSAY OF PHOSPHORUS: CPT

## 2020-06-27 PROCEDURE — 80053 COMPREHEN METABOLIC PANEL: CPT

## 2020-06-27 RX ORDER — CARVEDILOL 12.5 MG/1
12.5 TABLET ORAL 2 TIMES DAILY
Status: DISCONTINUED | OUTPATIENT
Start: 2020-06-27 | End: 2020-06-29 | Stop reason: HOSPADM

## 2020-06-27 RX ORDER — AMLODIPINE BESYLATE 5 MG/1
5 TABLET ORAL DAILY
Status: DISCONTINUED | OUTPATIENT
Start: 2020-06-28 | End: 2020-06-29 | Stop reason: HOSPADM

## 2020-06-27 RX ADMIN — PIPERACILLIN AND TAZOBACTAM 4.5 G: 4; .5 INJECTION, POWDER, FOR SOLUTION INTRAVENOUS at 05:06

## 2020-06-27 RX ADMIN — CARVEDILOL 12.5 MG: 12.5 TABLET, FILM COATED ORAL at 08:06

## 2020-06-27 RX ADMIN — HEPARIN SODIUM 5000 UNITS: 5000 INJECTION INTRAVENOUS; SUBCUTANEOUS at 05:06

## 2020-06-27 RX ADMIN — HEPARIN SODIUM 5000 UNITS: 5000 INJECTION INTRAVENOUS; SUBCUTANEOUS at 10:06

## 2020-06-27 RX ADMIN — HEPARIN SODIUM 5000 UNITS: 5000 INJECTION INTRAVENOUS; SUBCUTANEOUS at 06:06

## 2020-06-27 RX ADMIN — PANTOPRAZOLE SODIUM 40 MG: 40 TABLET, DELAYED RELEASE ORAL at 08:06

## 2020-06-27 RX ADMIN — SODIUM CHLORIDE, SODIUM LACTATE, POTASSIUM CHLORIDE, AND CALCIUM CHLORIDE 500 ML: .6; .31; .03; .02 INJECTION, SOLUTION INTRAVENOUS at 08:06

## 2020-06-27 RX ADMIN — PIPERACILLIN AND TAZOBACTAM 4.5 G: 4; .5 INJECTION, POWDER, FOR SOLUTION INTRAVENOUS at 06:06

## 2020-06-27 NOTE — PT/OT/SLP EVAL
Occupational Therapy   Evaluation    Name: Erin Sahu  MRN: [id number]  Admitting Diagnosis:  Hypotension      Recommendations:     Discharge Recommendations: home health OT  Discharge Equipment Recommendations:  none  Barriers to discharge:  None    Assessment:     Erni Sahu is a 89 y.o. female with a medical diagnosis of Hypotension.  She presents with decreased independence with ADL's. Performance deficits affecting function: weakness, impaired endurance, impaired self care skills, impaired functional mobilty, decreased upper extremity function, decreased lower extremity function, decreased safety awareness, impaired balance.      Rehab Prognosis: Fair; patient would benefit from acute skilled OT services to address these deficits and reach maximum level of function.       Plan:     Patient to be seen 3 x/week to address the above listed problems via self-care/home management, therapeutic activities, therapeutic exercises  · Plan of Care Expires: 07/27/20  · Plan of Care Reviewed with: patient    Subjective     Chief Complaint: none  Patient/Family Comments/goals: none stated    Occupational Profile:  Living Environment: Pt resides alone in 1 story house with 3 steps 2 rails to enter. Pt was independent with ADL's & used either SC or RW for ambulation.  Pt has a walk-in shower for bathing.  Pt is an active  & is retired from [employer]. Pt enjoys watching TV.  Equipment Used at Home:  (RW, SC, grab bars in shower, shower chair, 3in 1 commode)  Assistance upon Discharge: grand-daughter & other family members/neighbor    Pain/Comfort:  · Pain Rating 1: 5/10  · Location 1: (left thigh)  · Pain Addressed 1: Distraction, Cessation of Activity, Nurse notified, Reposition  · Pain Rating Post-Intervention 1: (pt did not rate)    Patients cultural, spiritual, Bahai conflicts given the current situation: no    Objective:     Communicated with: RN prior to session.  Patient found supine with telemetry,  peripheral IV upon OT entry to room. RN present, no family present.    General Precautions: Standard, fall(DNR)   Orthopedic Precautions:N/A   Braces: N/A     Occupational Performance:    Bed Mobility:    · Patient completed Supine to Sit with stand by assistance  · Patient completed Sit to Supine with moderate assistance    Functional Mobility/Transfers:  · Patient completed Sit <> Stand Transfer with stand by assistance  with  rolling walker   · Functional Mobility: SBA using RW    Activities of Daily Living:  · Upper Body Dressing: minimum assistance seated EOB  · Lower Body Dressing: stand by assistance donning socks seated EOB    Cognitive/Visual Perceptual:  Cognitive/Psychosocial Skills:     -       Oriented to: Person, Place, Time and Situation   -       Follows Commands/attention:Follows one-step commands  -       Communication: clear/fluent  -       Memory: No Deficits noted  -       Safety awareness/insight to disability: intact     Physical Exam:  Sensation:    -       Intact  Dominant hand:    -       right  Upper Extremity Range of Motion:     -       Right Upper Extremity: WFL except 90* shoulder flexion  -       Left Upper Extremity: WFL except 90* shoulder flexion  Upper Extremity Strength:    -       Right Upper Extremity: WFL except 3-/5 shoulder flexion  -       Left Upper Extremity: WFL except 3-/5 shoulder flexion   Strength:    -       Right Upper Extremity: WFL  -       Left Upper Extremity: WFL    AMPAC 6 Click ADL:  AMPAC Total Score: 18    Treatment & Education:  Provided education regarding role of OT, POC & discharge recommendations.  Pt had no further questions & when asked whether there were any concerns pt reported none.    Education:    Patient left supine on stretcher in hallway with all lines intact, call button in reach, RN notified and transport personnel present    GOALS:   Multidisciplinary Problems     Occupational Therapy Goals        Problem: Occupational Therapy Goal     Goal Priority Disciplines Outcome Interventions   Occupational Therapy Goal     OT, PT/OT Ongoing, Progressing    Description: Goals to be met by: 7/10/2020     Patient will increase functional independence with ADLs by performing:    UE Dressing with Supervision.  LE Dressing with Supervision.  Grooming while standing with Supervision.  Toileting from toilet with Supervision for hygiene and clothing management.   Supine to sit with Modified [place].  Step transfer with Modified [place] using RW                     History:     Past Medical History:   Diagnosis Date    Anxiety     Arthritis     Coronary artery disease     Diabetes mellitus type II     HHD (hypertensive heart disease)     Hyperlipidemia     Hypertension     Mitral valve disease     Pacemaker     Syncope and collapse        Past Surgical History:   Procedure Laterality Date    BACK SURGERY      BREAST SURGERY      CARDIAC PACEMAKER PLACEMENT  01/23/2009    CHOLECYSTECTOMY      CORONARY ANGIOPLASTY WITH STENT PLACEMENT  03/2001    EYE SURGERY Bilateral 06/2001    cataracts    HEMORRHOID SURGERY      HYSTERECTOMY      REPLACEMENT OF PACEMAKER GENERATOR N/A 5/1/2019    Procedure: REPLACEMENT, PACEMAKER GENERATOR;  Surgeon: Ezio Mclean MD;  Location: [facility] CATH;  Service: Cardiology;  Laterality: N/A;    ROTATOR CUFF REPAIR Right     TOTAL KNEE ARTHROPLASTY Right        Time Tracking:     OT Date of Treatment: 06/27/20  OT Start Time: 0834  OT Stop Time: 0849  OT Total Time (min): 15 min    Billable Minutes:Evaluation 15    SERGE Diop  6/27/2020

## 2020-06-27 NOTE — PLAN OF CARE
Problem: Physical Therapy Goal  Goal: Physical Therapy Goal  Description: Goals to be met by: 7/10/20     Patient will increase functional independence with mobility by performin. Supine to sit with Modified [place]  2. Sit to supine with Contact Guard Assistance  3. Sit to stand transfer with Modified [place]  4. Bed to chair transfer with Supervision using Rolling Walker  5. Gait  x 100 feet with Supervision using Rolling Walker.   6. Ascend/descend 3 stair with bilateral Handrails with Supervision.   7. Lower extremity exercise program x 20 reps per handout, with assistance as needed.    Outcome: Ongoing, Progressing     PT goals established.

## 2020-06-27 NOTE — PLAN OF CARE
Problem: Occupational Therapy Goal  Goal: Occupational Therapy Goal  Description: Goals to be met by: 7/10/2020     Patient will increase functional independence with ADLs by performing:    UE Dressing with Supervision.  LE Dressing with Supervision.  Grooming while standing with Supervision.  Toileting from toilet with Supervision for hygiene and clothing management.   Supine to sit with Modified [place].  Step transfer with Modified [place] using RW    Outcome: Ongoing, Progressing     OT eval completed.

## 2020-06-27 NOTE — TREATMENT PLAN
Hepatology Treatment Plan    Erin Sahu is a 89 y.o. female admitted to hospital 6/24/2020 (Hospital Day: 4) due to Hypotension.     Interval History  LFTs continue to improve.  Patient continues to be afebrile.  Cultures growing E coli, sensitivities pending.  unable to obtain MRCP due to pacemaker in place     Objective  Temp:  [97.5 °F (36.4 °C)-98.6 °F (37 °C)] 97.5 °F (36.4 °C) (06/27 1146)  Pulse:  [59-84] 59 (06/27 1121)  BP: (119-170)/(68-80) 119/73 (06/27 0842)  Resp:  [16-18] 16 (06/27 1051)  SpO2:  [94 %-98 %] 94 % (06/27 1051)      Laboratory    Lab Results   Component Value Date    WBC 6.96 06/27/2020    HGB 13.1 06/27/2020    HCT 37.9 06/27/2020    MCV 96 06/27/2020     (L) 06/27/2020       Lab Results   Component Value Date     06/27/2020    K 4.0 06/27/2020     06/27/2020    CO2 24 06/27/2020    BUN 37 (H) 06/27/2020    CREATININE 2.2 (H) 06/27/2020    CALCIUM 8.9 06/27/2020       Lab Results   Component Value Date    ALBUMIN 2.9 (L) 06/27/2020     (H) 06/27/2020    AST 83 (H) 06/27/2020    ALKPHOS 126 06/27/2020    BILITOT 1.8 (H) 06/27/2020       Lab Results   Component Value Date    INR 1.0 06/27/2020    INR 1.3 (H) 06/24/2020       MELD-Na score: 16 at 6/27/2020  3:13 AM  MELD score: 16 at 6/27/2020  3:13 AM  Calculated from:  Serum Creatinine: 2.2 mg/dL at 6/27/2020  3:13 AM  Serum Sodium: 137 mmol/L at 6/27/2020  3:13 AM  Total Bilirubin: 1.8 mg/dL at 6/27/2020  3:13 AM  INR(ratio): 1.0 at 6/27/2020  3:13 AM  Age: 89 years 10 months    Plan  Pending culture sensitivities.  Patient on IV antibiotics.  MRCP cannot be obtained.  Will monitor LFTs and determine need for EUS evaluation  Will continue to follow along  - please obtain daily CBC, BMP, LFT, INR  - Plan of care was discussed with primary team    Thank you for involving us in the care of Erin Sahu. Please call with any additional concerns or questions.    Bonnie Arias MD  Gastroenterology Fellow

## 2020-06-27 NOTE — CONSULTS
Unable to see pt due to increased PICC volume. Please page anethisia on call for assitance. Re consult Monday if needed.

## 2020-06-27 NOTE — PROGRESS NOTES
[facility] Hospital Medicine  Progress Note    Patient Name: Erin Sahu  MRN: [id number]  Patient Class: IP- Inpatient   Admission Date: 6/24/2020  Length of Stay: 3 days  Attending Physician: Marion Patterson MD  Primary Care Provider: Junior Godinez MD    Hospital Medicine Team: [facility]OS[facility] MED 5 Darin Shelby MD    Subjective:     Principal Problem:Sepsis due to Gram-negative organism with acute renal failure        HPI:  Erin Sahu is a 89 y.o. female with past medical history of HTN, pafib (on eliquis), HLD, T2DM, CAD, who presents with weakness. Patient states she woke up this morning with chills and was extremely weak and did not want to get out of bed. She was found by her granddaughter at home extremely weak and lightheaded and was complaining of shortness of breath and nausea. Her family measured her BP at home and it was found to be 74/40. Patient has had hypotensive episodes in the past, most recently 4/2020, at which time she was in the ICU, and it was reportedly attributed to her anti-hypertensive medication (had been recently uptitrated). EMS was called and they measured , but [facility] had SBP in the 80s. She also became hypoxic to 82% and was placed on 40% venti mask with improvement sats to 96%. She was given 600 cc IVF with minimal improvement in BP. Patient was noted to be listless and ill-appearing but awake and following commands.   Labs at [facility]sh ED: CR 1.12, BUN 19, AST 1626, , TB 5.1, , WBC 4.7, , normal TROP, CPK and TSH, lactate 2.7. She had a cxr done which was normal except for mild left costophrenic blunting. Her urine was reportedly normal. She was transferred to [facility] for hepatology evaluation.     Overview/Hospital Course:  Patient was admitted to hospital medicine for further management. She was started on IV Vanc/Zosyn along with IV fluids. CT abdomen/pelvis and US abdomen was unremarkable.  Hepatology was consulted given elevated LFTs and believe presentation is correlated with possibility of passed sludge/stone with possible cholangitis. Blood cx + GNRs. Hep panel, urine cx was negative. Patient was given IV fluids and overnight, BP improved, lactate. LFTs improving. DINO improving. Medically responded to Abx. Pending Hepatology/AES recs evaluation for CBD stone. Switched Zosyn to Rocephin as E coli and Proteus sensitive to rocephin.     Interval History: NAEON. Patient denies any pain. No N/V/F/C, no SOB. Medically improving. Hepatology wanted an MRCP - patient unable to get an MRI due to pacemaker.       Review of Systems   Constitutional: Negative for appetite change, chills, diaphoresis, fatigue and fever.   HENT: Negative for congestion and trouble swallowing.    Eyes: Negative for pain and visual disturbance.   Respiratory: Negative for cough, chest tightness, shortness of breath and wheezing.    Cardiovascular: Negative for chest pain, palpitations and leg swelling.   Gastrointestinal: Negative for abdominal pain, diarrhea, nausea and vomiting.   Genitourinary: Positive for decreased urine volume. Negative for difficulty urinating.   Musculoskeletal: Negative for arthralgias and myalgias.   Skin: Negative for wound.   Neurological: Positive for weakness. Negative for dizziness, light-headedness, numbness and headaches.   Psychiatric/Behavioral: Negative for confusion.     Objective:     Vital Signs (Most Recent):  Temp: 98.9 °F (37.2 °C) (06/27/20 1633)  Pulse: 69 (06/27/20 1633)  Resp: 16 (06/27/20 1633)  BP: (!) 181/81 (06/27/20 1633)  SpO2: (!) 93 % (06/27/20 1633) Vital Signs (24h Range):  Temp:  [97.5 °F (36.4 °C)-98.9 °F (37.2 °C)] 98.9 °F (37.2 °C)  Pulse:  [59-84] 69  Resp:  [16-18] 16  SpO2:  [93 %-97 %] 93 %  BP: (119-181)/(73-81) 181/81     Weight: 81.6 kg (179 lb 14.3 oz)  Body mass index is 30.88 kg/m².    Intake/Output Summary (Last 24 hours) at 6/27/2020 8910  Last data filed at  6/27/2020 0600  Gross per 24 hour   Intake 360 ml   Output 500 ml   Net -140 ml      Physical Exam  Constitutional:       Appearance: She is obese. She is not toxic-appearing.   HENT:      Head: Normocephalic and atraumatic.   Eyes:      Conjunctiva/sclera: Conjunctivae normal.      Pupils: Pupils are equal, round, and reactive to light.   Neck:      Musculoskeletal: Normal range of motion and neck supple.   Cardiovascular:      Rate and Rhythm: Normal rate. Rhythm irregular.      Heart sounds: No murmur.   Pulmonary:      Effort: Pulmonary effort is normal. No respiratory distress.      Breath sounds: Normal breath sounds. No wheezing.      Comments: On RA  Abdominal:      General: Abdomen is flat. There is no distension.      Palpations: Abdomen is soft.      Tenderness: There is no abdominal tenderness. There is no guarding or rebound.   Musculoskeletal: Normal range of motion.         General: No swelling or tenderness.   Skin:     General: Skin is warm and dry.      Capillary Refill: Capillary refill takes less than 2 seconds.   Neurological:      General: No focal deficit present.      Mental Status: She is alert and oriented to person, place, and time.      Comments: A&Ox4   Psychiatric:         Mood and Affect: Mood normal.         Behavior: Behavior normal.         Thought Content: Thought content normal.         Judgment: Judgment normal.         Significant Labs: All pertinent labs within the past 24 hours have been reviewed.    Significant Imaging: I have reviewed all pertinent imaging results/findings within the past 24 hours.      Assessment/Plan:      * Sepsis due to Gram-negative organism with acute renal failure  89 y.o. female with h/o HTN, pafib (on eliquis), HLD, T2DM, CAD, who presents with weakness, chills, shortness of breath, and nausea. Hypotensive to 70s SBP extremely weak and lightheaded and was complaining of shortness of breath and nausea. Her family measured her BP at home and it was  found to be 74/40.  - Labs at [facility] ED: CR 1.12, BUN 19, AST 1626, , TB 5.1, , WBC 4.7, , normal TROP, CPK and TSH, lactate 2.7. She had a cxr done which was normal except for mild left costophrenic blunting. Her urine was reportedly normal.   - Etiology of Sepsis: Likely Acute Cholangitis vs UTI vs PNA  - transaminitis: shock liver (w/ severe hypotension at home) vs. Drug induced (statin) vs. Viral (no known exposures)  - COVID negative    Plan:  - Blood cx with GNRs showing E coli and Proteus one in each bottle - E coli pan sensitive, Proteus resistant to bactrim  - On IV zosyn - now changed to rocephin starting tomorrow AM - anticipate need for 7-10 days of total antibiotics  - CT/US abdomen unremarkable except for some biliary sludge  - Hepatology consulted and believe presentation is 2/2 passed choledocholithiasis vs bile duct sludge - recommend getting an MRCP but patient has a pacemaker in place, so no MRI. Hepatology aware - pending further recs  - Urine cx negative. Hep panel negative  - DINO slowly resolving Cr 2.5>2.4>2.2 - IV fluids  cc bolus x 1        Debility  PT/OT to eval and treat      Discharge planning issues  - Pending final Hepatology recs - Monitors vs ERCP/EUS (concerned about CBD stone causing cholangitis)  - HH with PT/OT and possible IV abx for few days (TBD)      DINO (acute kidney injury)  Cr: 1.6>1.9>2.5>2.4. DINO likely 2/2 sepsis  Slowly resolving - now 2.2    IV fluids- LR 500cc bolus x 1 today  CBC daily  F/u renal panel  Will continue to monitor      Transaminitis  Likely 2/2 sepsis 2/2  choledocholithiasis vs bile duct sludge vs cholangitis    Improving.  AST: 832>479>180>83  ALT: 633>439>274>196    Alk Phos: 214>168>134  Tbili: 6.2>5.2>2.8>1.8    -Continue IV Abx  -F/u Hepatology recs  -Hold eliquis for possible procedure            Acute cholangitis  See Sepsis for details    PAF (paroxysmal atrial fibrillation)  H/o pafib: on coreg and eliquis.     -  Resumed coreg  - Holding eliquis for possible AES procedure per Hepatology      Essential hypertension  BP now in 150s-160s systolic    - resumed home coreg this morning - BP still in 160s  - Will resume home amlodipine tomorrow AM      Diabetes mellitus with neuropathy  On metformin at home, hold  BG in 90s-low 100s here. Has not needed SSI  Avoiding scheduled insulin  Diet controlled    - Continue LDSSI, accuchecks        VTE Risk Mitigation (From admission, onward)         Ordered     heparin (porcine) injection 5,000 Units  Every 8 hours      06/26/20 1414     IP VTE HIGH RISK PATIENT  Once      06/25/20 0715     Place sequential compression device  Until discontinued      06/24/20 2011                      Darin Shelby MD  Department of Hospital Medicine   [facility]

## 2020-06-27 NOTE — ASSESSMENT & PLAN NOTE
89 y.o. female with h/o HTN, pafib (on eliquis), HLD, T2DM, CAD, who presents with weakness, chills, shortness of breath, and nausea. Hypotensive to 70s SBP extremely weak and lightheaded and was complaining of shortness of breath and nausea. Her family measured her BP at home and it was found to be 74/40.  - Labs at [facility] ED: CR 1.12, BUN 19, AST 1626, , TB 5.1, , WBC 4.7, , normal TROP, CPK and TSH, lactate 2.7. She had a cxr done which was normal except for mild left costophrenic blunting. Her urine was reportedly normal.   - Etiology of Sepsis: Likely Acute Cholangitis vs UTI vs PNA  - transaminitis: shock liver (w/ severe hypotension at home) vs. Drug induced (statin) vs. Viral (no known exposures)  - COVID negative    Plan:  - Blood cx with GNRs showing E coli and Proteus one in each bottle - E coli pan sensitive, Proteus resistant to bactrim  - On IV zosyn - now changed to rocephin starting tomorrow AM - anticipate need for 7-10 days of total antibiotics  - CT/US abdomen unremarkable except for some biliary sludge  - Hepatology consulted and believe presentation is 2/2 passed choledocholithiasis vs bile duct sludge - recommend getting an MRCP but patient has a pacemaker in place, so no MRI. Hepatology aware - pending further recs  - Urine cx negative. Hep panel negative  - DINO slowly resolving Cr 2.5>2.4>2.2 - IV fluids  cc bolus x 1

## 2020-06-27 NOTE — PROGRESS NOTES
Unable to scan pt in MRI due to pacemaker / informed floor nurse Lolly we would need information on  Device and leads to clear for MRI

## 2020-06-27 NOTE — ASSESSMENT & PLAN NOTE
On metformin at home, hold  BG in 90s-low 100s here. Has not needed SSI  Avoiding scheduled insulin  Diet controlled    - Continue LDSSI, accuchecks

## 2020-06-27 NOTE — ASSESSMENT & PLAN NOTE
Likely 2/2 sepsis 2/2  choledocholithiasis vs bile duct sludge vs cholangitis    Improving.  AST: 832>479>180>83  ALT: 633>439>274>196    Alk Phos: 214>168>134  Tbili: 6.2>5.2>2.8>1.8    -Continue IV Abx  -F/u Hepatology recs  -Hold eliquis for possible procedure

## 2020-06-27 NOTE — SUBJECTIVE & OBJECTIVE
Interval History: NAEON. Patient denies any pain. No N/V/F/C, no SOB. Medically improving. Hepatology wanted an MRCP - patient unable to get an MRI due to pacemaker.       Review of Systems   Constitutional: Negative for appetite change, chills, diaphoresis, fatigue and fever.   HENT: Negative for congestion and trouble swallowing.    Eyes: Negative for pain and visual disturbance.   Respiratory: Negative for cough, chest tightness, shortness of breath and wheezing.    Cardiovascular: Negative for chest pain, palpitations and leg swelling.   Gastrointestinal: Negative for abdominal pain, diarrhea, nausea and vomiting.   Genitourinary: Positive for decreased urine volume. Negative for difficulty urinating.   Musculoskeletal: Negative for arthralgias and myalgias.   Skin: Negative for wound.   Neurological: Positive for weakness. Negative for dizziness, light-headedness, numbness and headaches.   Psychiatric/Behavioral: Negative for confusion.     Objective:     Vital Signs (Most Recent):  Temp: 98.9 °F (37.2 °C) (06/27/20 1633)  Pulse: 69 (06/27/20 1633)  Resp: 16 (06/27/20 1633)  BP: (!) 181/81 (06/27/20 1633)  SpO2: (!) 93 % (06/27/20 1633) Vital Signs (24h Range):  Temp:  [97.5 °F (36.4 °C)-98.9 °F (37.2 °C)] 98.9 °F (37.2 °C)  Pulse:  [59-84] 69  Resp:  [16-18] 16  SpO2:  [93 %-97 %] 93 %  BP: (119-181)/(73-81) 181/81     Weight: 81.6 kg (179 lb 14.3 oz)  Body mass index is 30.88 kg/m².    Intake/Output Summary (Last 24 hours) at 6/27/2020 1745  Last data filed at 6/27/2020 0600  Gross per 24 hour   Intake 360 ml   Output 500 ml   Net -140 ml      Physical Exam  Constitutional:       Appearance: She is obese. She is not toxic-appearing.   HENT:      Head: Normocephalic and atraumatic.   Eyes:      Conjunctiva/sclera: Conjunctivae normal.      Pupils: Pupils are equal, round, and reactive to light.   Neck:      Musculoskeletal: Normal range of motion and neck supple.   Cardiovascular:      Rate and Rhythm: Normal  rate. Rhythm irregular.      Heart sounds: No murmur.   Pulmonary:      Effort: Pulmonary effort is normal. No respiratory distress.      Breath sounds: Normal breath sounds. No wheezing.      Comments: On RA  Abdominal:      General: Abdomen is flat. There is no distension.      Palpations: Abdomen is soft.      Tenderness: There is no abdominal tenderness. There is no guarding or rebound.   Musculoskeletal: Normal range of motion.         General: No swelling or tenderness.   Skin:     General: Skin is warm and dry.      Capillary Refill: Capillary refill takes less than 2 seconds.   Neurological:      General: No focal deficit present.      Mental Status: She is alert and oriented to person, place, and time.      Comments: A&Ox4   Psychiatric:         Mood and Affect: Mood normal.         Behavior: Behavior normal.         Thought Content: Thought content normal.         Judgment: Judgment normal.         Significant Labs: All pertinent labs within the past 24 hours have been reviewed.    Significant Imaging: I have reviewed all pertinent imaging results/findings within the past 24 hours.

## 2020-06-27 NOTE — ASSESSMENT & PLAN NOTE
- Pending final Hepatology recs - Monitors vs ERCP/EUS (concerned about CBD stone causing cholangitis)  - HH with PT/OT and possible IV abx for few days (TBD)

## 2020-06-27 NOTE — PROGRESS NOTES
Pt has been AAO X4 today, grand daughter visited for a short time, IV access changed to the left wrist. Pt has been free from falls and injury. She does call for asst to use the BSC no incontinence noted today.

## 2020-06-27 NOTE — PT/OT/SLP EVAL
"Physical Therapy Evaluation    Patient Name:  Erin Sahu   MRN:  [id number]    Recommendations:     Discharge Recommendations:  home health PT   Discharge Equipment Recommendations: none   Barriers to discharge: None    Assessment:     Erin Sahu is a 89 y.o. female admitted with a medical diagnosis of Hypotension.  Pt presents with past medical history of HTN, pafib (on eliquis), HLD, T2DM, CAD, who presents with weakness. Patient states she woke up this morning with chills and was extremely weak and did not want to get out of bed.  Her family measured her BP at home and it was found to be 74/40. Patient has had hypotensive episodes in the past, most recently 4/2020, at which time she was in the ICU, and it was reportedly attributed to her anti-hypertensive medication (had been recently uptitrated).     Upon evaluation, pt requires SBA of 1 person to amb short distances, with RW for support.  She presents with the following impairments/functional limitations:  weakness, impaired endurance, impaired self care skills, impaired functional mobilty, gait instability, impaired balance.    Rehab Prognosis: Good; patient would benefit from acute skilled PT services to address these deficits and reach maximum level of function.    Recent Surgery: * No surgery found *      Plan:     During this hospitalization, patient to be seen 3 x/week to address the identified rehab impairments via gait training, therapeutic activities, therapeutic exercises, neuromuscular re-education and progress toward the following goals:    · Plan of Care Expires:  07/24/20    Subjective     Chief Complaint: L lateral thigh pain  Patient/Family Comments/goals: "Just my legs."  Pain/Comfort:  · Pain Rating 1: 5/10  · Location - Side 1: Left  · Location - Orientation 1: lateral  · Location 1: thigh  · Pain Addressed 1: Pre-medicate for activity, Distraction    Patients cultural, spiritual, Congregational conflicts given the current situation: " no    Living Environment:  Pt lives alone, SSH, 3STE with B HRs.   Prior to admission, patients level of function required use of cane or RW to amb, I with ADLs.  Equipment used at home: walker, rolling, cane, straight, grab bar, shower chair, bedside commode.  DME owned (not currently used): none.  Upon discharge, patient will have assistance from granddaughter.    Objective:     Communicated with nursing prior to session.  Patient found supine with peripheral IV, telemetry  upon PT entry to room.    General Precautions: Standard, fall, aspiration(dysphagia mechanical soft diet)   Orthopedic Precautions:N/A   Braces: N/A     Exams:  · Cognitive Exam:  Patient is oriented to Person, Place, Time and Situation  · Gross Motor Coordination:  WFL  · Postural Exam:  Patient presented with the following abnormalities:    · -       Rounded shoulders  · Skin Integrity/Edema:      · -       Edema: None noted B LEs  · RUE ROM: WFL  · RUE Strength: WFL  · LUE ROM: WFL  · LUE Strength: WFL  · RLE ROM: WFL  · RLE Strength: WFL  · LLE ROM: WFL  · LLE Strength: WFL    Functional Mobility:  · Bed Mobility:     · Scooting: Attempted to scoot post, with prolonged time to perform with minimal movement - most likely due to an elevated surface  · Supine to Sit: S with bed rail  · Sit to Supine: moderate assistance  · Transfers:     · Sit to Stand:  stand by assistance with no AD  · Bed to Chair: stand by assistance with  rolling walker  using  Stand Pivot  · Gait: Pt amb 18', SBA, RW, decreased gait speed  · Balance: SBA: dynamic standing balance with AD    Therapeutic Activities and Exercises:   Whiteboard updated    AM-PAC 6 CLICK MOBILITY  Total Score:18     Patient left supine with all lines intact, call button in reach and nursing and transport tech present.    GOALS:   Multidisciplinary Problems     Physical Therapy Goals        Problem: Physical Therapy Goal    Goal Priority Disciplines Outcome Goal Variances Interventions    Physical Therapy Goal     PT, PT/OT Ongoing, Progressing     Description: Goals to be met by: 7/10/20     Patient will increase functional independence with mobility by performin. Supine to sit with Modified [place]  2. Sit to supine with Contact Guard Assistance  3. Sit to stand transfer with Modified [place]  4. Bed to chair transfer with Supervision using Rolling Walker  5. Gait  x 100 feet with Supervision using Rolling Walker.   6. Ascend/descend 3 stair with bilateral Handrails with Supervision.   7. Lower extremity exercise program x 20 reps per handout, with assistance as needed.                     History:     Past Medical History:   Diagnosis Date    Anxiety     Arthritis     Coronary artery disease     Diabetes mellitus type II     HHD (hypertensive heart disease)     Hyperlipidemia     Hypertension     Mitral valve disease     Pacemaker     Syncope and collapse        Past Surgical History:   Procedure Laterality Date    BACK SURGERY      BREAST SURGERY      CARDIAC PACEMAKER PLACEMENT  2009    CHOLECYSTECTOMY      CORONARY ANGIOPLASTY WITH STENT PLACEMENT  2001    EYE SURGERY Bilateral 2001    cataracts    HEMORRHOID SURGERY      HYSTERECTOMY      REPLACEMENT OF PACEMAKER GENERATOR N/A 2019    Procedure: REPLACEMENT, PACEMAKER GENERATOR;  Surgeon: Ezio Mclean MD;  Location: [facility];  Service: Cardiology;  Laterality: N/A;    ROTATOR CUFF REPAIR Right     TOTAL KNEE ARTHROPLASTY Right        Time Tracking:     PT Received On: 20  PT Start Time: 0834     PT Stop Time: 0851  PT Total Time (min): 17 min     Billable Minutes: Evaluation 6 and Therapeutic Activity 8      Ana Cristina Saxena, PT  2020

## 2020-06-27 NOTE — ASSESSMENT & PLAN NOTE
Cr: 1.6>1.9>2.5>2.4. DINO likely 2/2 sepsis  Slowly resolving - now 2.2    IV fluids- LR 500cc bolus x 1 today  CBC daily  F/u renal panel  Will continue to monitor

## 2020-06-27 NOTE — ASSESSMENT & PLAN NOTE
BP now in 150s-160s systolic    - resumed home coreg this morning - BP still in 160s  - Will resume home amlodipine tomorrow AM

## 2020-06-27 NOTE — ASSESSMENT & PLAN NOTE
H/o pafib: on coreg and eliquis.     - Resumed coreg  - Holding eliquis for possible AES procedure per Hepatology

## 2020-06-28 LAB
ALBUMIN SERPL BCP-MCNC: 2.7 G/DL (ref 3.5–5.2)
ALP SERPL-CCNC: 112 U/L (ref 55–135)
ALT SERPL W/O P-5'-P-CCNC: 126 U/L (ref 10–44)
ANION GAP SERPL CALC-SCNC: 14 MMOL/L (ref 8–16)
AST SERPL-CCNC: 44 U/L (ref 10–40)
BASOPHILS # BLD AUTO: 0.02 K/UL (ref 0–0.2)
BASOPHILS NFR BLD: 0.5 % (ref 0–1.9)
BILIRUB SERPL-MCNC: 1.5 MG/DL (ref 0.1–1)
BUN SERPL-MCNC: 27 MG/DL (ref 8–23)
CALCIUM SERPL-MCNC: 9 MG/DL (ref 8.7–10.5)
CHLORIDE SERPL-SCNC: 102 MMOL/L (ref 95–110)
CO2 SERPL-SCNC: 21 MMOL/L (ref 23–29)
CREAT SERPL-MCNC: 1.6 MG/DL (ref 0.5–1.4)
DIFFERENTIAL METHOD: ABNORMAL
EOSINOPHIL # BLD AUTO: 0.2 K/UL (ref 0–0.5)
EOSINOPHIL NFR BLD: 5.1 % (ref 0–8)
ERYTHROCYTE [DISTWIDTH] IN BLOOD BY AUTOMATED COUNT: 13.4 % (ref 11.5–14.5)
EST. GFR  (AFRICAN AMERICAN): 32.5 ML/MIN/1.73 M^2
EST. GFR  (NON AFRICAN AMERICAN): 28.2 ML/MIN/1.73 M^2
GLUCOSE SERPL-MCNC: 70 MG/DL (ref 70–110)
HCT VFR BLD AUTO: 36.7 % (ref 37–48.5)
HGB BLD-MCNC: 12.6 G/DL (ref 12–16)
IMM GRANULOCYTES # BLD AUTO: 0.02 K/UL (ref 0–0.04)
IMM GRANULOCYTES NFR BLD AUTO: 0.5 % (ref 0–0.5)
LYMPHOCYTES # BLD AUTO: 1.1 K/UL (ref 1–4.8)
LYMPHOCYTES NFR BLD: 25.9 % (ref 18–48)
MCH RBC QN AUTO: 33.2 PG (ref 27–31)
MCHC RBC AUTO-ENTMCNC: 34.3 G/DL (ref 32–36)
MCV RBC AUTO: 97 FL (ref 82–98)
MONOCYTES # BLD AUTO: 0.4 K/UL (ref 0.3–1)
MONOCYTES NFR BLD: 8.3 % (ref 4–15)
NEUTROPHILS # BLD AUTO: 2.6 K/UL (ref 1.8–7.7)
NEUTROPHILS NFR BLD: 59.7 % (ref 38–73)
NRBC BLD-RTO: 0 /100 WBC
PLATELET # BLD AUTO: 92 K/UL (ref 150–350)
PMV BLD AUTO: 10.1 FL (ref 9.2–12.9)
POCT GLUCOSE: 142 MG/DL (ref 70–110)
POCT GLUCOSE: 145 MG/DL (ref 70–110)
POCT GLUCOSE: 77 MG/DL (ref 70–110)
POCT GLUCOSE: 84 MG/DL (ref 70–110)
POTASSIUM SERPL-SCNC: 3.4 MMOL/L (ref 3.5–5.1)
PROT SERPL-MCNC: 5.3 G/DL (ref 6–8.4)
RBC # BLD AUTO: 3.79 M/UL (ref 4–5.4)
SODIUM SERPL-SCNC: 137 MMOL/L (ref 136–145)
WBC # BLD AUTO: 4.32 K/UL (ref 3.9–12.7)

## 2020-06-28 PROCEDURE — 63600175 PHARM REV CODE 636 W HCPCS: Performed by: STUDENT IN AN ORGANIZED HEALTH CARE EDUCATION/TRAINING PROGRAM

## 2020-06-28 PROCEDURE — 93010 ELECTROCARDIOGRAM REPORT: CPT | Mod: ,,, | Performed by: INTERNAL MEDICINE

## 2020-06-28 PROCEDURE — 25000003 PHARM REV CODE 250: Performed by: STUDENT IN AN ORGANIZED HEALTH CARE EDUCATION/TRAINING PROGRAM

## 2020-06-28 PROCEDURE — 93010 EKG 12-LEAD: ICD-10-PCS | Mod: ,,, | Performed by: INTERNAL MEDICINE

## 2020-06-28 PROCEDURE — 87040 BLOOD CULTURE FOR BACTERIA: CPT

## 2020-06-28 PROCEDURE — 11000001 HC ACUTE MED/SURG PRIVATE ROOM

## 2020-06-28 PROCEDURE — 99232 PR SUBSEQUENT HOSPITAL CARE,LEVL II: ICD-10-PCS | Mod: GC,,, | Performed by: HOSPITALIST

## 2020-06-28 PROCEDURE — 80053 COMPREHEN METABOLIC PANEL: CPT

## 2020-06-28 PROCEDURE — 99232 SBSQ HOSP IP/OBS MODERATE 35: CPT | Mod: GC,,, | Performed by: HOSPITALIST

## 2020-06-28 PROCEDURE — 93005 ELECTROCARDIOGRAM TRACING: CPT

## 2020-06-28 PROCEDURE — 85025 COMPLETE CBC W/AUTO DIFF WBC: CPT

## 2020-06-28 PROCEDURE — 36415 COLL VENOUS BLD VENIPUNCTURE: CPT

## 2020-06-28 RX ORDER — POTASSIUM CHLORIDE 20 MEQ/1
40 TABLET, EXTENDED RELEASE ORAL ONCE
Status: COMPLETED | OUTPATIENT
Start: 2020-06-28 | End: 2020-06-28

## 2020-06-28 RX ADMIN — CARVEDILOL 12.5 MG: 12.5 TABLET, FILM COATED ORAL at 08:06

## 2020-06-28 RX ADMIN — HEPARIN SODIUM 5000 UNITS: 5000 INJECTION INTRAVENOUS; SUBCUTANEOUS at 02:06

## 2020-06-28 RX ADMIN — POTASSIUM CHLORIDE 40 MEQ: 1500 TABLET, EXTENDED RELEASE ORAL at 08:06

## 2020-06-28 RX ADMIN — CEFTRIAXONE 2 G: 2 INJECTION, SOLUTION INTRAVENOUS at 08:06

## 2020-06-28 RX ADMIN — HEPARIN SODIUM 5000 UNITS: 5000 INJECTION INTRAVENOUS; SUBCUTANEOUS at 05:06

## 2020-06-28 RX ADMIN — APIXABAN 2.5 MG: 2.5 TABLET, FILM COATED ORAL at 09:06

## 2020-06-28 RX ADMIN — PANTOPRAZOLE SODIUM 40 MG: 40 TABLET, DELAYED RELEASE ORAL at 08:06

## 2020-06-28 RX ADMIN — CARVEDILOL 12.5 MG: 12.5 TABLET, FILM COATED ORAL at 09:06

## 2020-06-28 RX ADMIN — AMLODIPINE BESYLATE 5 MG: 5 TABLET ORAL at 08:06

## 2020-06-28 NOTE — ASSESSMENT & PLAN NOTE
- HH with PT/OT and IV abx, further resolution of DINO. Pending midline. Patient declining EUS at this time given improvement in labs and symptoms.

## 2020-06-28 NOTE — ASSESSMENT & PLAN NOTE
Likely 2/2 sepsis 2/2  choledocholithiasis vs bile duct sludge vs cholangitis    Improving.  AST: 832>479>180>83>44  ALT: 633>439>274>196 >44  Alk Phos: 214>168>134>112  Tbili: 6.2>5.2>2.8>1.8>1.5    -Continue IV Abx  -hepatology recommending AES perform EUS. Patient declining procedure given improvement in symptoms and labs.

## 2020-06-28 NOTE — ASSESSMENT & PLAN NOTE
H/o pafib: on coreg and eliquis at home.     - Resumed coreg  - Holding eliquis for possible AES procedure per Hepatology

## 2020-06-28 NOTE — ASSESSMENT & PLAN NOTE
On metformin at home, hold  BG in 90s-low 100s here. No insulin required with good control of BG.    -D/C SSI to minimize potential for unintentional hypoglycemia.  -accuchecks

## 2020-06-28 NOTE — ASSESSMENT & PLAN NOTE
Cr: 1.6>1.9>2.5>2.4>1.6. DINO likely 2/2 sepsis. Improving    IV fluids  CBC daily  Will continue to monitor

## 2020-06-28 NOTE — ASSESSMENT & PLAN NOTE
89 y.o. female with h/o HTN, pafib (on eliquis), HLD, T2DM, CAD, who presents with weakness, chills, shortness of breath, and nausea. Hypotensive to 70s SBP extremely weak and lightheaded and was complaining of shortness of breath and nausea. Her family measured her BP at home and it was found to be 74/40.  - Labs at [facility] ED: CR 1.12, BUN 19, AST 1626, , TB 5.1, , WBC 4.7, , normal TROP, CPK and TSH, lactate 2.7. She had a cxr done which was normal except for mild left costophrenic blunting. Her urine was reportedly normal.   - Etiology of Sepsis: Likely Acute Cholangitis vs UTI vs PNA  - transaminitis: shock liver (w/ severe hypotension at home) vs. Drug induced (statin) vs. Viral (no known exposures)  - COVID negative    Plan:  - Blood cx with GNRs showing E coli and Proteus one in each bottle - E coli pan sensitive, Proteus resistant to bactrim  - On IV rocephin. Anticipate need for 7 days total of IV antibiotics expected end date (7/1/2020)  - Will need midline. Consult placed.   - CT/US abdomen unremarkable except for some biliary sludge  - Hepatology consulted and believe presentation is 2/2 passed choledocholithiasis vs bile duct sludge - hepatology recommending EUS. Patient declining at this time given improvement of symptoms and labs. Will continue to monitor.  - Urine cx negative. Hep panel negative  - DINO improving Cr 2.5>2.4>2.2>1.6

## 2020-06-28 NOTE — PLAN OF CARE
Problem: Infection  Goal: Infection Symptom Resolution  Outcome: Ongoing, Progressing   Patient is AAOx4. No c/o pain or distress noted. O2 saturations 90-92% room air. No c/o SOB. Safety measures maintained.

## 2020-06-28 NOTE — PROGRESS NOTES
[facility] Hospital Medicine  Progress Note    Patient Name: Erin Sahu  MRN: [id number]  Patient Class: IP- Inpatient   Admission Date: 6/24/2020  Length of Stay: 4 days  Attending Physician: Marion Patterson MD  Primary Care Provider: Junior Godinez MD    Hospital Medicine Team: [facility] HOSP MED 5 [facility] Kami Whitley MD    Subjective:     Principal Problem:Sepsis due to Gram-negative organism with acute renal failure        HPI:  Erin Sahu is a 89 y.o. female with past medical history of HTN, pafib (on eliquis), HLD, T2DM, CAD, who presents with weakness. Patient states she woke up this morning with chills and was extremely weak and did not want to get out of bed. She was found by her granddaughter at home extremely weak and lightheaded and was complaining of shortness of breath and nausea. Her family measured her BP at home and it was found to be 74/40. Patient has had hypotensive episodes in the past, most recently 4/2020, at which time she was in the ICU, and it was reportedly attributed to her anti-hypertensive medication (had been recently uptitrated). EMS was called and they measured , but by arrival in the ED at [facility] had SBP in the 80s. She also became hypoxic to 82% and was placed on 40% venti mask with improvement sats to 96%. She was given 600 cc IVF with minimal improvement in BP. Patient was noted to be listless and ill-appearing but awake and following commands.   Labs at [facility] ED: CR 1.12, BUN 19, AST 1626, , TB 5.1, , WBC 4.7, , normal TROP, CPK and TSH, lactate 2.7. She had a cxr done which was normal except for mild left costophrenic blunting. Her urine was reportedly normal. She was transferred to [facility] for hepatology evaluation.     Overview/Hospital Course:  Patient was admitted to hospital medicine for further management. She was started on IV Vanc/Zosyn along with IV fluids. CT abdomen/pelvis and US abdomen was unremarkable. Hepatology  was consulted given elevated LFTs and believe presentation is correlated with possibility of passed sludge/stone with possible cholangitis. Blood cx + GNRs. Hep panel, urine cx was negative. Patient was given IV fluids and overnight, BP, lactate, LFTs and DINO improved. Switched Zosyn to Rocephin as E coli and Proteus sensitive to rocephin.  Hepatology recommending EUS, which patient declined given improvement in symptoms and labs.     Interval History: NAEON. Patient denies any pain. No N/V/F/C, no SOB. Clinically improving. Hepatology recommending an EUS, which patient declined given improvement in symptoms and labs. Repeat blood cx x 1 on 6/25 positive for GNRs.      Review of Systems   Constitutional: Negative for appetite change, chills, diaphoresis, fatigue and fever.   HENT: Negative for congestion and trouble swallowing.    Eyes: Negative for pain and visual disturbance.   Respiratory: Negative for cough, chest tightness, shortness of breath and wheezing.    Cardiovascular: Negative for chest pain, palpitations and leg swelling.   Gastrointestinal: Negative for abdominal pain, diarrhea, nausea and vomiting.   Genitourinary: Negative for difficulty urinating.   Musculoskeletal: Negative for arthralgias and myalgias.   Skin: Negative for wound.   Neurological: Positive for weakness. Negative for dizziness, light-headedness, numbness and headaches.   Psychiatric/Behavioral: Negative for confusion.     Objective:     Vital Signs (Most Recent):  Temp: 97.6 °F (36.4 °C) (06/28/20 0823)  Pulse: 68 (06/28/20 1116)  Resp: 18 (06/28/20 0823)  BP: (!) 174/90 (06/28/20 0823)  SpO2: 100 % (06/28/20 0823) Vital Signs (24h Range):  Temp:  [97.4 °F (36.3 °C)-98.9 °F (37.2 °C)] 97.6 °F (36.4 °C)  Pulse:  [60-86] 68  Resp:  [16-20] 18  SpO2:  [92 %-100 %] 100 %  BP: (148-181)/(74-90) 174/90     Weight: 81.6 kg (179 lb 14.3 oz)  Body mass index is 30.88 kg/m².    Intake/Output Summary (Last 24 hours) at 6/28/2020 1203  Last  data filed at 6/28/2020 0823  Gross per 24 hour   Intake 160 ml   Output 675 ml   Net -515 ml      Physical Exam  Constitutional:       Appearance: She is obese. She is not toxic-appearing.   HENT:      Head: Normocephalic and atraumatic.   Eyes:      Conjunctiva/sclera: Conjunctivae normal.      Pupils: Pupils are equal, round, and reactive to light.   Neck:      Musculoskeletal: Normal range of motion and neck supple.   Cardiovascular:      Rate and Rhythm: Normal rate. Rhythm irregular.      Heart sounds: No murmur.   Pulmonary:      Effort: Pulmonary effort is normal. No respiratory distress.      Breath sounds: Normal breath sounds. No wheezing.      Comments: On RA  Abdominal:      General: Abdomen is flat. There is no distension.      Palpations: Abdomen is soft.      Tenderness: There is no abdominal tenderness. There is no guarding or rebound.   Musculoskeletal: Normal range of motion.         General: No swelling or tenderness.   Skin:     General: Skin is warm and dry.      Capillary Refill: Capillary refill takes less than 2 seconds.   Neurological:      General: No focal deficit present.      Mental Status: She is alert and oriented to person, place, and time.      Comments: A&Ox4   Psychiatric:         Mood and Affect: Mood normal.         Behavior: Behavior normal.         Thought Content: Thought content normal.         Judgment: Judgment normal.         Significant Labs: All pertinent labs within the past 24 hours have been reviewed.    Significant Imaging: I have reviewed all pertinent imaging results/findings within the past 24 hours.      Assessment/Plan:      * Sepsis due to Gram-negative organism with acute renal failure  89 y.o. female with h/o HTN, pafib (on eliquis), HLD, T2DM, CAD, who presents with weakness, chills, shortness of breath, and nausea. Hypotensive to 70s SBP extremely weak and lightheaded and was complaining of shortness of breath and nausea. Her family measured her BP at home  and it was found to be 74/40.  - Labs at [facility] ED: CR 1.12, BUN 19, AST 1626, , TB 5.1, , WBC 4.7, , normal TROP, CPK and TSH, lactate 2.7. She had a cxr done which was normal except for mild left costophrenic blunting. Her urine was reportedly normal.   - Etiology of Sepsis: Likely Acute Cholangitis vs UTI vs PNA  - transaminitis: shock liver (w/ severe hypotension at home) vs. Drug induced (statin) vs. Viral (no known exposures)  - COVID negative    Plan:  - Blood cx 6/24 with GNRs showing E coli and Proteus one in each bottle - E coli pan sensitive, Proteus resistant to bactrim. Repeat cx x 1 6/25 with GNR.   - On IV rocephin. Anticipate need for 7 days total of IV antibiotics expected end date (7/1/2020)  - Will need midline. Consult placed.   - CT/US abdomen unremarkable except for some biliary sludge  - Hepatology consulted and believe presentation is 2/2 passed choledocholithiasis vs bile duct sludge - hepatology recommending EUS. Patient declining at this time given improvement of symptoms and labs. Will continue to monitor.  - Urine cx negative. Hep panel negative  - DINO improving Cr 2.5>2.4>2.2>1.6         Debility  PT/OT to eval and treat      Discharge planning issues  - HH with PT/OT and IV abx, further resolution of DINO. Pending midline. Patient declining EUS at this time given improvement in labs and symptoms.      DINO (acute kidney injury)  Cr: 1.6>1.9>2.5>2.4>1.6. DINO likely 2/2 sepsis. Improving    IV fluids  CBC daily  Will continue to monitor      Transaminitis  Likely 2/2 sepsis 2/2  choledocholithiasis vs bile duct sludge vs cholangitis    Improving.  AST: 832>479>180>83>44  ALT: 633>439>274>196 >44  Alk Phos: 214>168>134>112  Tbili: 6.2>5.2>2.8>1.8>1.5    -Continue IV Abx  -hepatology recommending AES perform EUS. Patient declining procedure given improvement in symptoms and labs.        Acute cholangitis  See Sepsis for details    PAF (paroxysmal atrial fibrillation)  H/o  pafib: on coreg and eliquis at home.     - Resumed coreg  - Holding eliquis for possible AES procedure per Hepatology      Essential hypertension  BP now in 150s-160s systolic    - resumed home coreg and home amlodipine    Diabetes mellitus with neuropathy  On metformin at home, hold  BG in 90s-low 100s here. No insulin required with good control of BG.    -D/C SSI to minimize potential for unintentional hypoglycemia.  -accuchecks        VTE Risk Mitigation (From admission, onward)         Ordered     heparin (porcine) injection 5,000 Units  Every 8 hours      06/26/20 1414     IP VTE HIGH RISK PATIENT  Once      06/25/20 0715     Place sequential compression device  Until discontinued      06/24/20 2011                      [facility] Kami Whitley MD  Department of Hospital Medicine   [facility]

## 2020-06-28 NOTE — TREATMENT PLAN
Hepatology Treatment Plan    Erin Sahu is a 89 y.o. female admitted to hospital 6/24/2020 (Hospital Day: 5) due to Sepsis due to Gram-negative organism with acute renal failure.     Interval History  LFTs and symptoms continue to improve.   Afebrile.   Pan-sensitive E.coli and K.pneumoniae growing on blood cultures    Objective  Temp:  [96.7 °F (35.9 °C)-98.9 °F (37.2 °C)] 96.7 °F (35.9 °C) (06/28 1208)  Pulse:  [60-86] 65 (06/28 1208)  BP: (147-181)/(65-90) 147/65 (06/28 1208)  Resp:  [16-20] 16 (06/28 1208)  SpO2:  [92 %-100 %] 92 % (06/28 1208)    General: Alert, Oriented x3, no distress  Neurologic: Asterixis present  Abdomen: Normoactive bowel sounds. Non-distended. Normal tympany. Soft. Non-tender. No peritoneal signs.    Laboratory    Lab Results   Component Value Date    WBC 4.32 06/28/2020    HGB 12.6 06/28/2020    HCT 36.7 (L) 06/28/2020    MCV 97 06/28/2020    PLT 92 (L) 06/28/2020       Lab Results   Component Value Date     06/28/2020    K 3.4 (L) 06/28/2020     06/28/2020    CO2 21 (L) 06/28/2020    BUN 27 (H) 06/28/2020    CREATININE 1.6 (H) 06/28/2020    CALCIUM 9.0 06/28/2020       Lab Results   Component Value Date    ALBUMIN 2.7 (L) 06/28/2020     (H) 06/28/2020    AST 44 (H) 06/28/2020    ALKPHOS 112 06/28/2020    BILITOT 1.5 (H) 06/28/2020       Lab Results   Component Value Date    INR 1.0 06/27/2020    INR 1.3 (H) 06/24/2020       MELD-Na score: 12 at 6/28/2020  3:38 AM  MELD score: 12 at 6/28/2020  3:38 AM  Calculated from:  Serum Creatinine: 1.6 mg/dL at 6/28/2020  3:38 AM  Serum Sodium: 137 mmol/L at 6/28/2020  3:38 AM  Total Bilirubin: 1.5 mg/dL at 6/28/2020  3:38 AM  INR(ratio): 1.0 at 6/27/2020  3:13 AM  Age: 89 years 10 months    Plan  We discussed the case with AES. Patient would benefit from EUS to evaluate for need for ERCP intervention to prevent recurrent cholangitis  I explained reasoning to the patient and discussed risk and benefit. She decided to defer.  She states that she is 89 and prefers not to have any procedures.   Defer further evaluation  Continue abx treatment for e.coli and k.pneumoniae per primary team  Okay to discharge.  - please obtain daily CBC, BMP, LFT, INR  - Plan of care was discussed with primary team    Thank you for involving us in the care of Erin Sahu. Please call with any additional concerns or questions.    Bonnie Arias MD  Gastroenterology Fellow

## 2020-06-28 NOTE — PLAN OF CARE
Plan of care reviewed with pt. Pt aox4, VS as charted. Purposeful rounding for pt care and safety. Denies pain. No reports of NV. No falls/injury reported this shift. Bruising noted to bilat upper extremities, otherwise skin integrity intact. CBG checked as ordered. Safety precautions maintained - bed in low position, call light in reach, side rails up x2.

## 2020-06-29 ENCOUNTER — DOCUMENTATION ONLY (OUTPATIENT)
Dept: CARDIOLOGY | Facility: HOSPITAL | Age: 85
End: 2020-06-29

## 2020-06-29 ENCOUNTER — TELEPHONE (OUTPATIENT)
Dept: OBSTETRICS AND GYNECOLOGY | Facility: CLINIC | Age: 85
End: 2020-06-29

## 2020-06-29 VITALS
RESPIRATION RATE: 18 BRPM | TEMPERATURE: 98 F | SYSTOLIC BLOOD PRESSURE: 168 MMHG | WEIGHT: 177.69 LBS | DIASTOLIC BLOOD PRESSURE: 74 MMHG | HEIGHT: 64 IN | BODY MASS INDEX: 30.34 KG/M2 | HEART RATE: 71 BPM | OXYGEN SATURATION: 95 %

## 2020-06-29 LAB
ALBUMIN SERPL BCP-MCNC: 2.9 G/DL (ref 3.5–5.2)
ALP SERPL-CCNC: 107 U/L (ref 55–135)
ALT SERPL W/O P-5'-P-CCNC: 94 U/L (ref 10–44)
ANION GAP SERPL CALC-SCNC: 12 MMOL/L (ref 8–16)
AST SERPL-CCNC: 32 U/L (ref 10–40)
BASOPHILS # BLD AUTO: 0.02 K/UL (ref 0–0.2)
BASOPHILS NFR BLD: 0.5 % (ref 0–1.9)
BILIRUB SERPL-MCNC: 1.1 MG/DL (ref 0.1–1)
BUN SERPL-MCNC: 19 MG/DL (ref 8–23)
CALCIUM SERPL-MCNC: 8.8 MG/DL (ref 8.7–10.5)
CHLORIDE SERPL-SCNC: 101 MMOL/L (ref 95–110)
CO2 SERPL-SCNC: 24 MMOL/L (ref 23–29)
CREAT SERPL-MCNC: 1.1 MG/DL (ref 0.5–1.4)
DIFFERENTIAL METHOD: ABNORMAL
EOSINOPHIL # BLD AUTO: 0.3 K/UL (ref 0–0.5)
EOSINOPHIL NFR BLD: 6.1 % (ref 0–8)
ERYTHROCYTE [DISTWIDTH] IN BLOOD BY AUTOMATED COUNT: 13 % (ref 11.5–14.5)
EST. GFR  (AFRICAN AMERICAN): 51.1 ML/MIN/1.73 M^2
EST. GFR  (NON AFRICAN AMERICAN): 44.3 ML/MIN/1.73 M^2
GLUCOSE SERPL-MCNC: 113 MG/DL (ref 70–110)
HCT VFR BLD AUTO: 37.1 % (ref 37–48.5)
HGB BLD-MCNC: 12.8 G/DL (ref 12–16)
IMM GRANULOCYTES # BLD AUTO: 0.04 K/UL (ref 0–0.04)
IMM GRANULOCYTES NFR BLD AUTO: 0.9 % (ref 0–0.5)
LYMPHOCYTES # BLD AUTO: 1.2 K/UL (ref 1–4.8)
LYMPHOCYTES NFR BLD: 28.6 % (ref 18–48)
MCH RBC QN AUTO: 33 PG (ref 27–31)
MCHC RBC AUTO-ENTMCNC: 34.5 G/DL (ref 32–36)
MCV RBC AUTO: 96 FL (ref 82–98)
MONOCYTES # BLD AUTO: 0.6 K/UL (ref 0.3–1)
MONOCYTES NFR BLD: 13.1 % (ref 4–15)
NEUTROPHILS # BLD AUTO: 2.2 K/UL (ref 1.8–7.7)
NEUTROPHILS NFR BLD: 50.8 % (ref 38–73)
NRBC BLD-RTO: 0 /100 WBC
PLATELET # BLD AUTO: 93 K/UL (ref 150–350)
PMV BLD AUTO: 10.5 FL (ref 9.2–12.9)
POCT GLUCOSE: 124 MG/DL (ref 70–110)
POCT GLUCOSE: 157 MG/DL (ref 70–110)
POCT GLUCOSE: 194 MG/DL (ref 70–110)
POCT GLUCOSE: 80 MG/DL (ref 70–110)
POTASSIUM SERPL-SCNC: 3.2 MMOL/L (ref 3.5–5.1)
PROT SERPL-MCNC: 5.4 G/DL (ref 6–8.4)
RBC # BLD AUTO: 3.88 M/UL (ref 4–5.4)
SODIUM SERPL-SCNC: 137 MMOL/L (ref 136–145)
WBC # BLD AUTO: 4.26 K/UL (ref 3.9–12.7)

## 2020-06-29 PROCEDURE — 63600175 PHARM REV CODE 636 W HCPCS: Performed by: STUDENT IN AN ORGANIZED HEALTH CARE EDUCATION/TRAINING PROGRAM

## 2020-06-29 PROCEDURE — 76937 US GUIDE VASCULAR ACCESS: CPT

## 2020-06-29 PROCEDURE — 25000003 PHARM REV CODE 250: Performed by: STUDENT IN AN ORGANIZED HEALTH CARE EDUCATION/TRAINING PROGRAM

## 2020-06-29 PROCEDURE — C1751 CATH, INF, PER/CENT/MIDLINE: HCPCS

## 2020-06-29 PROCEDURE — 36410 VNPNXR 3YR/> PHY/QHP DX/THER: CPT

## 2020-06-29 PROCEDURE — 99238 HOSP IP/OBS DSCHRG MGMT 30/<: CPT | Mod: GC,,, | Performed by: HOSPITALIST

## 2020-06-29 PROCEDURE — 97116 GAIT TRAINING THERAPY: CPT

## 2020-06-29 PROCEDURE — 36415 COLL VENOUS BLD VENIPUNCTURE: CPT

## 2020-06-29 PROCEDURE — 97530 THERAPEUTIC ACTIVITIES: CPT

## 2020-06-29 PROCEDURE — 99238 PR HOSPITAL DISCHARGE DAY,<30 MIN: ICD-10-PCS | Mod: GC,,, | Performed by: HOSPITALIST

## 2020-06-29 PROCEDURE — 85025 COMPLETE CBC W/AUTO DIFF WBC: CPT

## 2020-06-29 PROCEDURE — 80053 COMPREHEN METABOLIC PANEL: CPT

## 2020-06-29 PROCEDURE — 97535 SELF CARE MNGMENT TRAINING: CPT

## 2020-06-29 RX ORDER — FUROSEMIDE 20 MG/1
20 TABLET ORAL EVERY OTHER DAY
Status: CANCELLED
Start: 2020-06-29

## 2020-06-29 RX ORDER — POTASSIUM CHLORIDE 20 MEQ/1
40 TABLET, EXTENDED RELEASE ORAL ONCE
Status: DISCONTINUED | OUTPATIENT
Start: 2020-06-29 | End: 2020-06-29

## 2020-06-29 RX ORDER — POTASSIUM CHLORIDE 20 MEQ/1
40 TABLET, EXTENDED RELEASE ORAL 2 TIMES DAILY
Status: DISCONTINUED | OUTPATIENT
Start: 2020-06-29 | End: 2020-06-29 | Stop reason: HOSPADM

## 2020-06-29 RX ORDER — FUROSEMIDE 20 MG/1
20 TABLET ORAL EVERY OTHER DAY
Status: ON HOLD
Start: 2020-06-29 | End: 2020-09-21 | Stop reason: HOSPADM

## 2020-06-29 RX ORDER — AMLODIPINE BESYLATE 2.5 MG/1
5 TABLET ORAL DAILY
Start: 2020-06-29 | End: 2020-07-07

## 2020-06-29 RX ADMIN — CARVEDILOL 12.5 MG: 12.5 TABLET, FILM COATED ORAL at 08:06

## 2020-06-29 RX ADMIN — AMLODIPINE BESYLATE 5 MG: 5 TABLET ORAL at 08:06

## 2020-06-29 RX ADMIN — CEFTRIAXONE 2 G: 2 INJECTION, SOLUTION INTRAVENOUS at 08:06

## 2020-06-29 RX ADMIN — PANTOPRAZOLE SODIUM 40 MG: 40 TABLET, DELAYED RELEASE ORAL at 08:06

## 2020-06-29 RX ADMIN — POTASSIUM CHLORIDE 40 MEQ: 1500 TABLET, EXTENDED RELEASE ORAL at 08:06

## 2020-06-29 RX ADMIN — APIXABAN 2.5 MG: 2.5 TABLET, FILM COATED ORAL at 08:06

## 2020-06-29 NOTE — PLAN OF CARE
06/29/20 0845   Post-Acute Status   Post-Acute Authorization Home Health   Home Health Status Awaiting Internal Medical Clearance

## 2020-06-29 NOTE — PLAN OF CARE
Patient will be discharged home with [facility]. Transport will be through family.       06/29/20 1404   Final Note   Assessment Type Final Discharge Note   Anticipated Discharge Disposition Home-Health   Hospital Follow Up  Appt(s) scheduled? Yes   Discharge plans and expectations educations in teach back method with documentation complete? Yes   Right Care Referral Info   Post Acute Recommendation Home-care   [facility]   Post-Acute Status   Post-Acute Authorization Home Health   Post-Acute Placement Status Set-up Complete   Home Health Status Set-up Complete     Future Appointments   Date Time Provider Department Center   7/7/2020  9:00 AM MD RAND Steven [facility]   9/14/2020  9:45 AM MD RAND Steven  [facility]     Rena Bedoya RN, CM   Ext: [phone]

## 2020-06-29 NOTE — PLAN OF CARE
Pt would benefit from continued skilled acute PT services to improve functional mobility. POC is appropriate and pt should continue towards current goals set to progress to PLOF.     Problem: Physical Therapy Goal  Goal: Physical Therapy Goal  Description: Goals to be met by: 7/10/20     Patient will increase functional independence with mobility by performin. Supine to sit with Modified [place]  2. Sit to supine with Contact Guard Assistance  3. Sit to stand transfer with Modified [place]  4. Bed to chair transfer with Supervision using Rolling Walker  5. Gait  x 100 feet with Supervision using Rolling Walker.   6. Ascend/descend 3 stair with bilateral Handrails with Supervision.   7. Lower extremity exercise program x 20 reps per handout, with assistance as needed.    Outcome: Ongoing, Progressing

## 2020-06-29 NOTE — PT/OT/SLP PROGRESS
Occupational Therapy   Co-Treatment    Name: Erin Sahu  MRN: [id number]  Admitting Diagnosis:  Sepsis due to Gram-negative organism with acute renal failure       Recommendations:     Discharge Recommendations: home with home health  Discharge Equipment Recommendations:  none  Barriers to discharge:  None    Assessment:     Erin Sahu is a 89 y.o. female with a medical diagnosis of Sepsis due to Gram-negative organism with acute renal failure.  She presents with impairments listed below. Pt did well to tolerate and participate in the session. Pt is progressing towards goals. Pt would benefit from  services to endure safety in the home and highest level of functioning. Pt displayed global deconditioning requiring increased assist for ADLs and mobility at this time. Pt would benefit from skilled OT services to improve independence and overall occupational functioning.     Performance deficits affecting function are weakness, impaired endurance, impaired self care skills, impaired functional mobilty, decreased lower extremity function, impaired cardiopulmonary response to activity.     Rehab Prognosis:  Good; patient would benefit from acute skilled OT services to address these deficits and reach maximum level of function.       Plan:     Patient to be seen 3 x/week to address the above listed problems via self-care/home management, therapeutic activities, therapeutic exercises  · Plan of Care Expires: 07/27/20  · Plan of Care Reviewed with: patient    Subjective     Pain/Comfort:  · Pain Rating 1: 0/10  · Pain Rating Post-Intervention 1: 0/10    Objective:     Communicated with: RN prior to session.  Patient found HOB elevated with peripheral IV upon OT entry to room.    General Precautions: Standard, fall   Orthopedic Precautions:N/A   Braces: N/A     Occupational Performance:     Bed Mobility:    · Patient completed Scooting/Bridging with stand by assistance  · Patient completed Supine to Sit with stand by  assistance  · Patient completed Sit to Supine with stand by assistance     Functional Mobility/Transfers:  · Patient completed Sit <> Stand Transfer with stand by assistance  with  rolling walker   · Functional Mobility: Pt ambulated ~40 ft at [facility] w/ RW.     Activities of Daily Living:  · Grooming: supervision oral and facial hygiene while standing at the sink      [facility] [address] 6 Click ADL: 21    Treatment & Education:  Pt educated on POC and D/C recs.     Patient left HOB elevated with all lines intact and call button in reachEducation:      GOALS:   Multidisciplinary Problems     Occupational Therapy Goals        Problem: Occupational Therapy Goal    Goal Priority Disciplines Outcome Interventions   Occupational Therapy Goal     OT, PT/OT Ongoing, Progressing    Description: Goals to be met by: 7/10/2020     Patient will increase functional independence with ADLs by performing:    UE Dressing with Supervision.  LE Dressing with Supervision.  Grooming while standing with Supervision. Met 6/29/2020  Toileting from toilet with Supervision for hygiene and clothing management.   Supine to sit with Modified [place].  Step transfer with Modified [place] using RW                     Time Tracking:     OT Date of Treatment: 06/29/20  OT Start Time: 0922  OT Stop Time: 0945  OT Total Time (min): 23 min    Billable Minutes:Self Care/Home Management 23 minutes    Rolando Almonte OT  6/29/2020

## 2020-06-29 NOTE — PHYSICIAN QUERY
"PT Name: Erin Sahu  MR #: [id number]    Physician Query Form - Heart  Condition Clarification     CDS/: Ana Cristina Levy RN              Contact information: [email or web address]  This form is a permanent document in the medical record.     Query Date: June 29, 2020    By submitting this query, we are merely seeking further clarification of documentation. Please utilize your independent clinical judgment when addressing the question(s) below.    The medical record contains the following   Indicators     Supporting Clinical Findings Location in Medical Record   x BNP Labs at osh ED:       H&P 6/25       Hospital Medicine      x EF The estimated ejection fraction is 60% Echo 6/25     x Radiology findings Impression:  Cardiomegaly with mild pulmonary vascular congestion, suggestive of mild pulmonary edema secondary to CHF.   CXR 6/24   x Echo Results · Eccentric left ventricular hypertrophy.  · Normal left ventricular systolic function. The estimated ejection fraction is 60%.  · Low normal right ventricular systolic function.  · Grade II (moderate) left ventricular diastolic dysfunction consistent with pseudonormalization.  · Moderate left atrial enlargement.  · Mild-to-moderate aortic regurgitation.  · The estimated PA systolic pressure is 31 mmHg.  · Intermediate central venous pressure (8 mmHg).   Echo 6/25    "Ascites" documented     x "SOB" or "CAMPOS" documented Hypotensive to 70s SBP extremely weak and lightheaded and was complaining of shortness of breath and nausea.   H&P 6/25       Hospital Medicine            x Heart Failure documented She has a past medical history of HTN, syncope, Afib on Eliquis, HLD on Pravastatin, unknown type of CHF   Care Plan 6/24       Plan of Care           "Edema" documented     x Diuretics/Meds Current Outpatient Medications on File Prior to Encounter     furosemide (LASIX) 20 MG tablet   Take 1 tablet (20 mg total) by mouth once daily.       H&P 6/25        Hospital " Medicine    Treatment:      Other:      Heart failure (HF) can be acute, chronic or both. It is generally further specificed as systolic, diastolic, or combined. Lastly, it is important to identify an underlying etiology if known or suspected.     Common clues to acute exacerbation:  Rapidly progressive symptoms (w/in 2 weeks of presentation), using IV diuretics to treat, using supplemental O2, pulmonary edema on Xray, MI w/in 4 weeks, and/or BNP >500    Systolic Heart Failure: is defined as chart documentation of a left ventricular ejection fraction (LVEF) less than 40%     Diastolic Heart Failure: is defined as a left ventricular ejection fraction (LVEF) greater than 40%   +      Evidence of diastolic dysfunction on echocardiography OR    Right heart catheterization wedge pressure above 12 mm Hg OR    Left heart catheterization left ventricular end diastolic pressure 18 mm Hg or above.    References: *American Heart Association    The clinical guidelines noted below are only system guidelines, and do not replace the providers clinical judgment.     Provider, please specify the diagnosis associated with above clinical findings    [ X  ] Chronic Diastolic Heart Failure - Pre-existing diastolic HF diagnosis.  EF > 40%  without  acute HF symptoms documented   [   ] Other Type of Heart Failure (please specify type):     [   ] Other (please specify):     [  ] Clinically Undetermined                           Please document in your progress notes daily for the duration of treatment until resolved and include in your discharge summary.

## 2020-06-29 NOTE — PLAN OF CARE
06/29/20 1459   Post-Acute Status   Post-Acute Authorization Home Health   Home Health Status Referrals Sent

## 2020-06-29 NOTE — PROGRESS NOTES
Pt is discharge today, she had a midline placed to the upper left arm at bedside today, infusion rep here earlier and taught the daughter how to administer the IV medication. Follow up teaching completed with pt and her daughter, both voiced their understanding.She is able to transfer to and from the bedside commode. Last BM was today. Pt has had no complaints, she is excited to go home. Discharge orders reviewed with Pt and her daughter questions answered and both voiced their understanding. IV access to the right arm x2 removed, Pt did discharge with the mid line.

## 2020-06-29 NOTE — PLAN OF CARE
Pt and daughter taught about administering her medications through the mid line placed in the upper left arm I instructed both to avoid removing the dressing to the mid line  I educated them on the signs of infection and what to report.  Report any redness, warmth, odor, drainage or pain to the Dr. Immediately. Both voiced their understanding.

## 2020-06-29 NOTE — PLAN OF CARE
Problem: Occupational Therapy Goal  Goal: Occupational Therapy Goal  Description: Goals to be met by: 7/10/2020     Patient will increase functional independence with ADLs by performing:    UE Dressing with Supervision.  LE Dressing with Supervision.  Grooming while standing with Supervision. Met 6/29/2020  Toileting from toilet with Supervision for hygiene and clothing management.   Supine to sit with Modified [place].  Step transfer with Modified [place] using RW    Outcome: Ongoing, Progressing    Rolando Almonte OTR/L  6/29/2020

## 2020-06-29 NOTE — PLAN OF CARE
Went over plan of care - all questions answered. Pt npo for u/s today. No falls or injuries. Will continue to monitor

## 2020-06-29 NOTE — DISCHARGE INSTRUCTIONS
Follow up with your PCP in 1 week with repeat CBC/CMP labs for hospital follow up. Physical therapy/Occupational therapy will come to your house to assist you. Home health will come to your house to assist you with IV administration.

## 2020-06-29 NOTE — TELEPHONE ENCOUNTER
----- Message from Lidia Ogden sent at 2020 12:40 PM CDT -----  Regarding: Appointment Access  Contact: Alfredkrishna with [facility]  Erin Sahu  MRN: [id number]  : 1929  PCP: Junior Godinez  Home Phone      [phone]  Work Phone      Not on file.  Mobile          Not on file.      MESSAGE:    Request appointment access - one week per physician request for hospital f/u with PCP.   Appointment scheduled on 20.     Phone # [phone]    Pharmacy - [facility] - [facility] [address]

## 2020-06-29 NOTE — PT/OT/SLP PROGRESS
Physical Therapy Treatment    Patient Name:  Erin Sahu   MRN:  [id number]    Recommendations:     Discharge Recommendations:  home with home health   Discharge Equipment Recommendations: none   Barriers to discharge: None    Assessment:     Erin Sahu is a 89 y.o. female admitted with a medical diagnosis of Sepsis due to Gram-negative organism with acute renal failure.  She presents with the following impairments/functional limitations:  impaired endurance. Pt tolerated session well on this date improving in functional mobility and activity tolerance. Pt is near baseline and is safe to discharge home w/HH once medically stable. Pt would benefit from continued skilled acute PT 3x/wk to improve functional mobility.  Recommending pt receive PT services in HH setting following discharge from hospital once medically cleared.     Rehab Prognosis: Good; patient would benefit from acute skilled PT services to address these deficits and reach maximum level of function.    Recent Surgery: Procedure(s) (LRB):  ERCP (ENDOSCOPIC RETROGRADE CHOLANGIOPANCREATOGRAPHY) (N/A)  ULTRASOUND, UPPER GI TRACT, ENDOSCOPIC (N/A)      Plan:     During this hospitalization, patient to be seen 3 x/week to address the identified rehab impairments via gait training, therapeutic activities, therapeutic exercises, neuromuscular re-education and progress toward the following goals:    · Plan of Care Expires:  07/24/20    Subjective     Chief Complaint: none noted   Patient/Family Comments/goals: Pt pleasant and willing to participate with therapy on this date.    Pain/Comfort:  · Pain Rating 1: 0/10      Objective:     Communicated with NSG prior to session.  Patient found HOB elevated with   upon PT entry to room.     General Precautions: Standard, fall   Orthopedic Precautions:N/A   Braces: N/A     Functional Mobility:  · Bed Mobility:     · Scooting: stand by assistance  · Supine to Sit: stand by assistance  · Transfers:     · Sit to Stand:   stand by assistance with rolling walker  · Gait: 14ft x2 CGA w/RW  · Balance: SBA      AM-PAC 6 CLICK MOBILITY  Turning over in bed (including adjusting bedclothes, sheets and blankets)?: 3  Sitting down on and standing up from a chair with arms (e.g., wheelchair, bedside commode, etc.): 3  Moving from lying on back to sitting on the side of the bed?: 3  Moving to and from a bed to a chair (including a wheelchair)?: 3  Need to walk in hospital room?: 3  Climbing 3-5 steps with a railing?: 3  Basic Mobility Total Score: 18       Therapeutic Activities and Exercises:   - EOB Sitting: ~7mins SBA performing ADL tasks  - Static [place] SBA performing ADL/grooming tasks at sink   - Pt educated on:   -PT roles, expectations, and POC    -Safety with mobility   -Benefits of OOB activities to increase strength and functional mobility    -Performing ther ex for increasing LE ROM and strength   -Discharge recommendations     Patient left HOB elevated with call button in reach..    GOALS:   Multidisciplinary Problems     Physical Therapy Goals        Problem: Physical Therapy Goal    Goal Priority Disciplines Outcome Goal Variances Interventions   Physical Therapy Goal     PT, PT/OT Ongoing, Progressing     Description: Goals to be met by: 7/10/20     Patient will increase functional independence with mobility by performin. Supine to sit with Modified [place]  2. Sit to supine with Contact Guard Assistance  3. Sit to stand transfer with Modified [place]  4. Bed to chair transfer with Supervision using Rolling Walker  5. Gait  x 100 feet with Supervision using Rolling Walker.   6. Ascend/descend 3 stair with bilateral Handrails with Supervision.   7. Lower extremity exercise program x 20 reps per handout, with assistance as needed.                     Time Tracking:     PT Received On: 20  PT Start Time: 919     PT Stop Time: 943  PT Total Time (min): 24 min     Billable Minutes: Gait Training 11  and Therapeutic Activity 13    Treatment Type: Treatment  PT/PTA: PT           Nirmal Slater, PT  06/29/2020

## 2020-06-29 NOTE — PLAN OF CARE
Referral sent to [facility]. Patient stated she had this agency prior to admit and would like to resume with agency.      06/29/20 1013   Post-Acute Status   Post-Acute Authorization Placement   Post-Acute Placement Status Referrals Sent     Rena Bedoya RN, CM   Ext: [phone]

## 2020-06-29 NOTE — PLAN OF CARE
[facility]    HOME HEALTH ORDERS  FACE TO FACE ENCOUNTER    Patient Name: Erin Sahu  YOB: 1929    PCP: Junior Godinez MD   PCP Address: [address] [place] [address] / [place] [address]  PCP Phone Number: [phone]  PCP Fax: [phone]    Encounter Date: 06/29/2020    Admit to Home Health    Diagnoses:  Active Hospital Problems    Diagnosis  POA    *Sepsis due to Gram-negative organism with acute renal failure [A41.50, R65.20, N17.9]  Yes    Discharge planning issues [Z02.9]  Not Applicable    Debility [R53.81]  Yes    DINO (acute kidney injury) [N17.9]  Yes    Elevated LFTs [R94.5]  Yes    Acute cholangitis [K83.09]  Yes    Transaminitis [R74.0]  Yes    PAF (paroxysmal atrial fibrillation) [I48.0]  Yes    SSS (sick sinus syndrome) [I49.5]  Yes    Essential hypertension [I10]  Yes    Diabetes mellitus with neuropathy [E11.40]  Yes      Resolved Hospital Problems   No resolved problems to display.       Future Appointments   Date Time Provider Department Center   9/14/2020  9:45 AM Junior Godinez MD [facility]           I have seen and examined this patient face to face today. My clinical findings that support the need for the home health skilled services and home bound status are the following:  Weakness/numbness causing balance and gait disturbance due to Infection and Weakness/Debility making it taxing to leave home. Patient needs home IV antibiotics.    Allergies:  Review of patient's allergies indicates:   Allergen Reactions    Iodinated contrast media Hives       Diet: soft mechanical diet    Activities: activity as tolerated    Nursing:   SN to complete comprehensive assessment including routine vital signs. Instruct on disease process and s/s of complications to report to MD. Review/verify medication list sent home with the patient at time of discharge  and instruct patient/caregiver as needed. Frequency may be adjusted depending on start of care  date.    Notify MD if SBP > 160 or < 90; DBP > 90 or < 50; HR > 120 or < 50; Temp > 101; Other:         CONSULTS:    Physical Therapy to evaluate and treat. Evaluate for home safety and equipment needs; Establish/upgrade home exercise program. Perform / instruct on therapeutic exercises, gait training, transfer training, and Range of Motion.  Occupational Therapy to evaluate and treat. Evaluate home environment for safety and equipment needs. Perform/Instruct on transfers, ADL training, ROM, and therapeutic exercises. Aide to provide assistance with personal care, ADLs, and vital signs.    MISCELLANEOUS CARE:  Home Infusion Therapy:   SN to perform Infusion Therapy/Central Line Care.  Review Central Line Care & Central Line Flush with patient.    Administer (drug and dose): Ceftriaxone 2 g q24h (Estimated End date: 7/8/2020)  Last dose given: 6/29/2020                         Home dose due:     Scrub the Hub: Prior to accessing the line, always perform a 30 second alcohol scrub  Each lumen of the central line is to be flushed at least daily with 10 mL Normal Saline and 3 mL Heparin flush (10 units/mL)  Skilled Nurse (SN) may draw blood from IV access  Blood Draw Procedure:   - Aspirate at least 5 mL of blood   - Discard   - Obtain specimen   - Change injection cap   - Flush with 20 mL Normal Saline followed by a                 3-5 mL Heparin flush (10 units/mL)  Central :   - Sterile dressing changes are done weekly and as needed.   - Use chlor-hexadine scrub to cleanse site, apply Biopatch to insertion site,       apply securement device dressing   - Injection caps are changed weekly and after EVERY lab draw.   - If sterile gauze is under dressing to control oozing,                 dressing change must be performed every 24 hours until gauze is not needed.    WOUND CARE ORDERS  n/a      Medications: Review discharge medications with patient and family and provide education.      Current Discharge  Medication List      START taking these medications    Details   cefTRIAXone (ROCEPHIN) 2 g/50 mL PgBk IVPB Inject 50 mLs (2 g total) into the vein once daily. Estimated end date: 7/8/2020 for 9 days  Qty: 9 each, Refills: 0         CONTINUE these medications which have CHANGED    Details   amLODIPine (NORVASC) 2.5 MG tablet Take 2 tablets (5 mg total) by mouth once daily.    Comments: .  Associated Diagnoses: Essential hypertension      furosemide (LASIX) 20 MG tablet Take 1 tablet (20 mg total) by mouth every other day.    Associated Diagnoses: Essential hypertension         CONTINUE these medications which have NOT CHANGED    Details   acetaminophen (TYLENOL) 325 MG tablet Take 325 mg by mouth every 6 (six) hours as needed for Pain.      apixaban (ELIQUIS) 2.5 mg Tab Take 1 tablet (2.5 mg total) by mouth 2 (two) times daily.      aspirin (ECOTRIN) 81 MG EC tablet Take 1 tablet (81 mg total) by mouth once daily.  Qty: 1 Bottle, Refills: 1      carvediloL (COREG) 12.5 MG tablet Take 12.5 mg by mouth 2 (two) times daily.    Comments: .      cloNIDine (CATAPRES) 0.1 MG tablet Take 0.1 mg by mouth as needed (hypertension).    Comments: .      gabapentin (NEURONTIN) 300 MG capsule Take 1 capsule (300 mg total) by mouth 3 (three) times daily.  Qty: 270 capsule, Refills: 1    Associated Diagnoses: Diabetes mellitus without complication      irbesartan (AVAPRO) 150 MG tablet Take 150 mg by mouth once daily.    Comments: .      LORazepam (ATIVAN) 0.5 MG tablet TAKE 1 TABLET EVERY 8 HOURS AS NEEDED  Qty: 90 tablet, Refills: 1    Associated Diagnoses: Anxiety disorder, unspecified type      pantoprazole (PROTONIX) 40 MG tablet Take 1 tablet (40 mg total) by mouth once daily.  Qty: 90 tablet, Refills: 1    Associated Diagnoses: Gastroesophageal reflux disease, esophagitis presence not specified      potassium chloride SA (K-DUR,KLOR-CON) 20 MEQ tablet Take 20 mEq by mouth once daily.      pravastatin (PRAVACHOL) 20 MG tablet  Take 1 tablet (20 mg total) by mouth nightly.  Qty: 90 tablet, Refills: 1    Associated Diagnoses: Controlled type 2 diabetes mellitus without complication, without long-term current use of insulin      alcohol swabs PadM Apply 1 each topically once daily.  Qty: 100 each, Refills: 6      diabetic supplies, miscellan. Misc TRUEPLUS SUPER THIN 28G LANCET. USE AS DIRECTED TO TEST BLOOD SUGAR TWICE DAILY.  Qty: 100 each, Refills: 12    Associated Diagnoses: Diabetes mellitus without complication; Essential hypertension      metFORMIN (GLUCOPHAGE) 500 MG tablet Take 1 tablet (500 mg total) by mouth every evening.  Qty: 90 tablet, Refills: 3    Associated Diagnoses: Controlled type 2 diabetes mellitus without complication, without long-term current use of insulin      nitroGLYCERIN (NITROSTAT) 0.4 MG SL tablet Place 1 tablet (0.4 mg total) under the tongue every 5 (five) minutes as needed.  Qty: 30 tablet, Refills: 1    Associated Diagnoses: Coronary artery disease, angina presence unspecified, unspecified vessel or lesion type, unspecified whether native or transplanted heart      TRUE METRIX GLUCOSE TEST STRIP Strp USE TO TEST BLOOD SUGARS 2 TIMES DAILY.  Qty: 200 strip, Refills: 12             I certify that this patient is confined to her home and needs physical therapy and occupational therapy and intermittent skilled nursing care.

## 2020-06-29 NOTE — PROGRESS NOTES
Received a call from the MRI Department in relation to this patient needing to be scheduled for a MRI and has a [employer]tronic Pacemaker.  Please see information below as it relates to patient's Device being MRI compatible/conditional.         Medtronic [place] XT CR [id number]  RA Lead: [id number]  RV Lead: [id number]    Although patient's Pacemaker is MR-Conditional, RA and RV leads are NOT MR-Conditional as per [employer]'s MR-Conditional Product Listing web site.      Satnam in MRI informed of the above.  Understanding was verbalized.

## 2020-06-29 NOTE — DISCHARGE SUMMARY
[facility] Hospital Medicine  Discharge Summary      Patient Name: Erin Sahu  MRN: [id number]  Admission Date: 6/24/2020  Hospital Length of Stay: 5 days  Discharge Date and Time:  06/29/2020 12:56 PM  Attending Physician: Marion Patterson MD   Discharging Provider: Rebecca Whitley MD  Primary Care Provider: Junior Godinez MD  Hospital Medicine Team: [facility] [place] [place] HOSP MED 5 [facility] Kami Whitley MD    HPI:   Erin Sahu is a 89 y.o. female with past medical history of HTN, pafib (on eliquis), HLD, T2DM, CAD, who presents with weakness. Patient states she woke up this morning with chills and was extremely weak and did not want to get out of bed. She was found by her granddaughter at home extremely weak and lightheaded and was complaining of shortness of breath and nausea. Her family measured her BP at home and it was found to be 74/40. Patient has had hypotensive episodes in the past, most recently 4/2020, at which time she was in the ICU, and it was reportedly attributed to her anti-hypertensive medication (had been recently uptitrated). EMS was called and they measured , but by arrival in the ED at [facility] the [facility] had SBP in the 80s. She also became hypoxic to 82% and was placed on 40% venti mask with improvement sats to 96%. She was given 600 cc IVF with minimal improvement in BP. Patient was noted to be listless and ill-appearing but awake and following commands.   Labs at [facility] ED: CR 1.12, BUN 19, AST 1626, , TB 5.1, , WBC 4.7, , normal TROP, CPK and TSH, lactate 2.7. She had a cxr done which was normal except for mild left costophrenic blunting. Her urine was reportedly normal. She was transferred to [facility] [place] [place] for hepatology evaluation.     Procedure(s) (LRB):  ERCP (ENDOSCOPIC RETROGRADE CHOLANGIOPANCREATOGRAPHY) (N/A)  ULTRASOUND, UPPER GI TRACT, ENDOSCOPIC (N/A)      Hospital Course:   Patient was admitted to hospital medicine for further management. She was started on IV  Vanc/Zosyn along with IV fluids. CT abdomen/pelvis and US abdomen was unremarkable. Hepatology was consulted given elevated LFTs and believe presentation is correlated with possibility of passed sludge/stone with possible cholangitis. Blood cx + GNRs. Switched Zosyn to Rocephin as E coli and Proteus sensitive to rocephin. Vitals, labs, and symptoms continued to improve.  Hepatology recommending EUS for possible ERCP, which patient declined given age, improvement in symptoms, and labs. Patient received a midline and will be discharged home with IV Cefriaxone for a total 14 day treatment for GNR bacteremia (estimated end date: 7/8/2020). Patient will follow up with PCP in 1 week with repeat CBC/labs.     Vitals:    06/29/20 0200 06/29/20 0223 06/29/20 0359 06/29/20 0749   BP:  120/80 118/70 (!) 172/84   Patient Position:       Pulse:  80 76 62   Resp:  18 18 15   Temp:  98.5 °F (36.9 °C) 98.2 °F (36.8 °C) 96.8 °F (36 °C)   TempSrc:  Oral Oral    SpO2:  97%  (!) 94%   Weight: 80.6 kg (177 lb 11.1 oz)  80.6 kg (177 lb 11.1 oz)    Height:         Physical Exam  Constitutional:       Appearance: She is obese. She is not toxic-appearing.   HENT:      Head: Normocephalic and atraumatic.   Eyes:      Conjunctiva/sclera: Conjunctivae normal.      Pupils: Pupils are equal, round, and reactive to light.   Neck:      Musculoskeletal: Normal range of motion and neck supple.   Cardiovascular:      Rate and Rhythm: Normal rate. Rhythm irregular.      Heart sounds: No murmur.   Pulmonary:      Effort: Pulmonary effort is normal. No respiratory distress.      Breath sounds: Normal breath sounds. No wheezing.      Comments: On RA  Abdominal:      General: Abdomen is flat. There is no distension.      Palpations: Abdomen is soft.      Tenderness: There is no abdominal tenderness. There is no guarding or rebound.   Musculoskeletal: Normal range of motion.         General: No swelling or tenderness.   Skin:     General: Skin is warm and  dry.      Capillary Refill: Capillary refill takes less than 2 seconds.   Neurological:      General: No focal deficit present.      Mental Status: She is alert and oriented to person, place, and time.      Comments: A&Ox4   Psychiatric:         Mood and Affect: Mood normal.         Behavior: Behavior normal.         Thought Content: Thought content normal.         Judgment: Judgment normal.        Consults:   Consults (From admission, onward)        Status Ordering Provider     Inpatient consult to Hepatology  Once     Provider:  (Not yet assigned)    Completed GREGORIA SANCHEZ     Inpatient consult to Midline team  Once     Provider:  (Not yet assigned)    Completed MAGDALENA STEEN     Inpatient consult to Midline team  Once     Provider:  (Not yet assigned)    Completed NI BUTLER          No new Assessment & Plan notes have been filed under this hospital service since the last note was generated.  Service: Hospital Medicine    Final Active Diagnoses:    Diagnosis Date Noted POA    PRINCIPAL PROBLEM:  Sepsis due to Gram-negative organism with acute renal failure [A41.50, R65.20, N17.9] 06/27/2020 Yes    Discharge planning issues [Z02.9] 06/27/2020 Not Applicable    Debility [R53.81] 06/27/2020 Yes    DINO (acute kidney injury) [N17.9] 06/25/2020 Yes    Elevated LFTs [R94.5] 06/25/2020 Yes    Acute cholangitis [K83.09] 06/24/2020 Yes    Transaminitis [R74.0] 06/24/2020 Yes    PAF (paroxysmal atrial fibrillation) [I48.0] 05/01/2019 Yes    SSS (sick sinus syndrome) [I49.5] 05/01/2019 Yes    Essential hypertension [I10] 07/23/2015 Yes    Diabetes mellitus with neuropathy [E11.40] 12/31/2012 Yes      Problems Resolved During this Admission:       Discharged Condition: fair    Disposition: home    Follow Up:  Follow-up Information     Junior Godinez MD On 7/14/2020.    Specialty: Internal Medicine  Why: hospital follow up at 3:15 PM  Contact information:  [address]  [place]  [address]  [phone]                 Patient Instructions:      CBC W/ AUTO DIFFERENTIAL   Standing Status: Future Standing Exp. Date: 08/28/21     COMPREHENSIVE METABOLIC PANEL   Standing Status: Future Standing Exp. Date: 08/28/21       Significant Diagnostic Studies: Labs:   CMP   Recent Labs   Lab 06/28/20 0338 06/29/20 0413    137   K 3.4* 3.2*    101   CO2 21* 24   GLU 70 113*   BUN 27* 19   CREATININE 1.6* 1.1   CALCIUM 9.0 8.8   PROT 5.3* 5.4*   ALBUMIN 2.7* 2.9*   BILITOT 1.5* 1.1*   ALKPHOS 112 107   AST 44* 32   * 94*   ANIONGAP 14 12   ESTGFRAFRICA 32.5* 51.1*   EGFRNONAA 28.2* 44.3*    and CBC   Recent Labs   Lab 06/28/20 0338 06/29/20 0413   WBC 4.32 4.26   HGB 12.6 12.8   HCT 36.7* 37.1   PLT 92* 93*       Pending Diagnostic Studies:     None         Medications:  Reconciled Home Medications:      Medication List      START taking these medications    cefTRIAXone 2 g/50 mL Pgbk IVPB  Commonly known as: ROCEPHIN  Inject 50 mLs (2 g total) into the vein once daily. Estimated end date: 7/8/2020         CHANGE how you take these medications    diabetic supplies, miscellan. Misc  TRUEPLUS SUPER THIN 28G LANCET. USE AS DIRECTED TO TEST BLOOD SUGAR TWICE DAILY.  What changed: Another medication with the same name was removed. Continue taking this medication, and follow the directions you see here.        CONTINUE taking these medications    acetaminophen 325 MG tablet  Commonly known as: TYLENOL  Take 325 mg by mouth every 6 (six) hours as needed for Pain.     alcohol swabs Padm  Apply 1 each topically once daily.     amLODIPine 2.5 MG tablet  Commonly known as: NORVASC  Take 2 tablets (5 mg total) by mouth once daily.     apixaban 2.5 mg Tab  Commonly known as: ELIQUIS  Take 1 tablet (2.5 mg total) by mouth 2 (two) times daily.     aspirin 81 MG EC tablet  Commonly known as: ECOTRIN  Take 1 tablet (81 mg total) by mouth once daily.     carvediloL 12.5 MG tablet  Commonly known as:  COREG  Take 12.5 mg by mouth 2 (two) times daily.     cloNIDine 0.1 MG tablet  Commonly known as: CATAPRES  Take 0.1 mg by mouth as needed (hypertension).     furosemide 20 MG tablet  Commonly known as: LASIX  Take 1 tablet (20 mg total) by mouth every other day.     gabapentin 300 MG capsule  Commonly known as: NEURONTIN  Take 1 capsule (300 mg total) by mouth 3 (three) times daily.     irbesartan 150 MG tablet  Commonly known as: AVAPRO  Take 150 mg by mouth once daily.     LORazepam 0.5 MG tablet  Commonly known as: ATIVAN  TAKE 1 TABLET EVERY 8 HOURS AS NEEDED     metFORMIN 500 MG tablet  Commonly known as: GLUCOPHAGE  Take 1 tablet (500 mg total) by mouth every evening.     nitroGLYCERIN 0.4 MG SL tablet  Commonly known as: NITROSTAT  Place 1 tablet (0.4 mg total) under the tongue every 5 (five) minutes as needed.     pantoprazole 40 MG tablet  Commonly known as: PROTONIX  Take 1 tablet (40 mg total) by mouth once daily.     potassium chloride SA 20 MEQ tablet  Commonly known as: K-DUR,KLOR-CON  Take 20 mEq by mouth once daily.     pravastatin 20 MG tablet  Commonly known as: PRAVACHOL  Take 1 tablet (20 mg total) by mouth nightly.     TRUE METRIX GLUCOSE TEST STRIP Strp  Generic drug: blood sugar diagnostic  USE TO TEST BLOOD SUGARS 2 TIMES DAILY.            Indwelling Lines/Drains at time of discharge:   Lines/Drains/Airways     None                 Time spent on the discharge of patient: 40 minutes  Patient was seen and examined on the date of discharge and determined to be suitable for discharge.         Rebecca Whitley MD  Department of Hospital Medicine  [facility]

## 2020-06-29 NOTE — CONSULTS
Single lumen 18g x 8 midline placed to left cephalic vein.  Max dwell date 7/28/20, Lot# [id number]  Needle advance into the vessel under real time ultrasound guidance.  Image recorded and saved.

## 2020-06-30 ENCOUNTER — PATIENT OUTREACH (OUTPATIENT)
Dept: ADMINISTRATIVE | Facility: CLINIC | Age: 85
End: 2020-06-30

## 2020-06-30 LAB
BACTERIA BLD CULT: ABNORMAL
BACTERIA BLD CULT: NORMAL

## 2020-06-30 NOTE — PATIENT INSTRUCTIONS
Sepsis     To treat sepsis, antibiotics and fluids may by given through an intravenous (IV) line.     Sepsis happens when your body responds with widespread inflammation to a bad infection or bacteremia--the presence of bacteria in your bloodstream. Sepsis can be deadly. Blood pressure may drop and the lungs and kidneys may start to fail. Emergency care for sepsis is crucial.  Risk factors  Those most at risk for sepsis are:  · Infants or older adults  · People who have an illness that weakens their immune system, such as cancer, AIDS, or diabetes  · People being treated with chemotherapy medicines or radiation, which weakens the immune system  · People who have had a transplant  · People with a very severe infection such as pneumonia, meningitis, or a urinary tract infection  When to go to the emergency department (ED)  Sepsis is an emergency. Go to the nearest ED if you have a fever with any of these symptoms:  · Chills and shaking  · Rapid heartbeat and breathing  · Trouble breathing  · Severe nausea or uncontrolled vomiting  · Confusion, disorientation, drowsiness, or dizziness  · Decreased urination  · Severe pain, including in the back or joints   What to expect in the ED  · Blood and urine tests are done to look for bacteria. They also check for organ failure.  · Blood, urine, or sputum cultures may be taken. The samples are sent to a lab. They are placed in a special container. Any bacteria should grow in 24 hours.  · X-rays or other imaging tests may be done.  A person with sepsis will be admitted to the hospital and treated with antibiotics. Treatment may also include oxygen and intravenous fluids.  Date Last Reviewed: 10/1/2016  © 3472-5517 [employer]. [address] [place] [address], [place] [address]. All rights reserved. This information is not intended as a substitute for professional medical care. Always follow your healthcare professional's instructions.        Sepsis     To treat sepsis,  antibiotics and fluids may by given through an intravenous (IV) line.     Sepsis happens when your body responds with widespread inflammation to a bad infection or bacteremia--the presence of bacteria in your bloodstream. Sepsis can be deadly. Blood pressure may drop and the lungs and kidneys may start to fail. Emergency care for sepsis is crucial.  Risk factors  Those most at risk for sepsis are:  · Infants or older adults  · People who have an illness that weakens their immune system, such as cancer, AIDS, or diabetes  · People being treated with chemotherapy medicines or radiation, which weakens the immune system  · People who have had a transplant  · People with a very severe infection such as pneumonia, meningitis, or a urinary tract infection  When to go to the emergency department (ED)  Sepsis is an emergency. Go to the nearest ED if you have a fever with any of these symptoms:  · Chills and shaking  · Rapid heartbeat and breathing  · Trouble breathing  · Severe nausea or uncontrolled vomiting  · Confusion, disorientation, drowsiness, or dizziness  · Decreased urination  · Severe pain, including in the back or joints   What to expect in the ED  · Blood and urine tests are done to look for bacteria. They also check for organ failure.  · Blood, urine, or sputum cultures may be taken. The samples are sent to a lab. They are placed in a special container. Any bacteria should grow in 24 hours.  · X-rays or other imaging tests may be done.  A person with sepsis will be admitted to the hospital and treated with antibiotics. Treatment may also include oxygen and intravenous fluids.  Date Last Reviewed: 10/1/2016  © 8482-8525 [employer]. [address] [place] [address], [place] [address]. All rights reserved. This information is not intended as a substitute for professional medical care. Always follow your healthcare professional's instructions.

## 2020-06-30 NOTE — TELEPHONE ENCOUNTER
Contact [employer] for delivery confirmation of antibiotic IV ordered. Shipped yesterday for today delivery per Tamiko Pino.  Contacted [facility] and confirmed that patient I scheduled for visit today.    Patient was informed of this information

## 2020-07-03 LAB — BACTERIA BLD CULT: NORMAL

## 2020-07-07 ENCOUNTER — OFFICE VISIT (OUTPATIENT)
Dept: INTERNAL MEDICINE | Facility: CLINIC | Age: 85
End: 2020-07-07
Payer: MEDICARE

## 2020-07-07 VITALS
BODY MASS INDEX: 30.78 KG/M2 | DIASTOLIC BLOOD PRESSURE: 88 MMHG | OXYGEN SATURATION: 97 % | SYSTOLIC BLOOD PRESSURE: 138 MMHG | HEART RATE: 84 BPM | RESPIRATION RATE: 16 BRPM | WEIGHT: 180.31 LBS | HEIGHT: 64 IN

## 2020-07-07 DIAGNOSIS — R79.89 ELEVATED LFTS: ICD-10-CM

## 2020-07-07 DIAGNOSIS — A41.50 SEPSIS DUE TO GRAM-NEGATIVE ORGANISM WITH ACUTE RENAL FAILURE: ICD-10-CM

## 2020-07-07 DIAGNOSIS — Z09 HOSPITAL DISCHARGE FOLLOW-UP: Primary | ICD-10-CM

## 2020-07-07 DIAGNOSIS — N17.9 SEPSIS DUE TO GRAM-NEGATIVE ORGANISM WITH ACUTE RENAL FAILURE: ICD-10-CM

## 2020-07-07 DIAGNOSIS — R65.20 SEPSIS DUE TO GRAM-NEGATIVE ORGANISM WITH ACUTE RENAL FAILURE: ICD-10-CM

## 2020-07-07 PROCEDURE — 1126F PR PAIN SEVERITY QUANTIFIED, NO PAIN PRESENT: ICD-10-PCS | Mod: S$GLB,,, | Performed by: INTERNAL MEDICINE

## 2020-07-07 PROCEDURE — 99214 OFFICE O/P EST MOD 30 MIN: CPT | Mod: S$GLB,,, | Performed by: INTERNAL MEDICINE

## 2020-07-07 PROCEDURE — 1101F PR PT FALLS ASSESS DOC 0-1 FALLS W/OUT INJ PAST YR: ICD-10-PCS | Mod: CPTII,S$GLB,, | Performed by: INTERNAL MEDICINE

## 2020-07-07 PROCEDURE — 99214 PR OFFICE/OUTPT VISIT, EST, LEVL IV, 30-39 MIN: ICD-10-PCS | Mod: S$GLB,,, | Performed by: INTERNAL MEDICINE

## 2020-07-07 PROCEDURE — 1159F MED LIST DOCD IN RCRD: CPT | Mod: S$GLB,,, | Performed by: INTERNAL MEDICINE

## 2020-07-07 PROCEDURE — 1159F PR MEDICATION LIST DOCUMENTED IN MEDICAL RECORD: ICD-10-PCS | Mod: S$GLB,,, | Performed by: INTERNAL MEDICINE

## 2020-07-07 PROCEDURE — 1126F AMNT PAIN NOTED NONE PRSNT: CPT | Mod: S$GLB,,, | Performed by: INTERNAL MEDICINE

## 2020-07-07 PROCEDURE — 99999 PR PBB SHADOW E&M-EST. PATIENT-LVL IV: ICD-10-PCS | Mod: PBBFAC,,, | Performed by: INTERNAL MEDICINE

## 2020-07-07 PROCEDURE — 99999 PR PBB SHADOW E&M-EST. PATIENT-LVL IV: CPT | Mod: PBBFAC,,, | Performed by: INTERNAL MEDICINE

## 2020-07-07 PROCEDURE — 1101F PT FALLS ASSESS-DOCD LE1/YR: CPT | Mod: CPTII,S$GLB,, | Performed by: INTERNAL MEDICINE

## 2020-07-07 NOTE — PROGRESS NOTES
Subjective:       Patient ID: Erin Sahu is a 89 y.o. female.    Chief Complaint: Hospital Follow Up    Discharge Summary              Patient Name: Erin Sahu    MRN: [id number]    Admission Date: 6/24/2020    Hospital Length of Stay: 5 days    Discharge Date and Time:  06/29/2020 12:56 PM    Attending Physician: Marion Patterson MD     Discharging Provider: Rebecca Whitley MD    Primary Care Provider: Junior Godinez MD    Hospital Medicine Team: [facility] HOSP MED 5 Rebecca Whitley MD         HPI:     Erni Sahu is a 89 y.o. female with past medical history of HTN, pafib (on eliquis), HLD, T2DM, CAD, who presents with weakness. Patient states she woke up this morning with chills and was extremely weak and did not want to get out of bed. She was found by her granddaughter at home extremely weak and lightheaded and was complaining of shortness of breath and nausea. Her family measured her BP at home and it was found to be 74/40. Patient has had hypotensive episodes in the past, most recently 4/2020, at which time she was in the ICU, and it was reportedly attributed to her anti-hypertensive medication (had been recently uptitrated). EMS was called and they measured , but by arrival in the ED at [facility] had SBP in the 80s. She also became hypoxic to 82% and was placed on 40% venti mask with improvement sats to 96%. She was given 600 cc IVF with minimal improvement in BP. Patient was noted to be listless and ill-appearing but awake and following commands.     Labs at [facility] ED: CR 1.12, BUN 19, AST 1626, , TB 5.1, , WBC 4.7, , normal TROP, CPK and TSH, lactate 2.7. She had a cxr done which was normal except for mild left costophrenic blunting. Her urine was reportedly normal. She was transferred to [facility] for hepatology evaluation.          Procedure(s) (LRB):    ERCP (ENDOSCOPIC RETROGRADE CHOLANGIOPANCREATOGRAPHY) (N/A)    ULTRASOUND, UPPER GI TRACT, ENDOSCOPIC (N/A)            Hospital Course:     Patient was admitted to hospital medicine for further management. She was started on IV Vanc/Zosyn along with IV fluids. CT abdomen/pelvis and US abdomen was unremarkable. Hepatology was consulted given elevated LFTs and believe presentation is correlated with possibility of passed sludge/stone with possible cholangitis. Blood cx + GNRs. Switched Zosyn to Rocephin as E coli and Proteus sensitive to rocephin. Vitals, labs, and symptoms continued to improve.  Hepatology recommending EUS for possible ERCP, which patient declined given age, improvement in symptoms, and labs. Patient received a midline and will be discharged home with IV Cefriaxone for a total 14 day treatment for GNR bacteremia (estimated end date: 7/8/2020). Patient will follow up with PCP in 1 week with repeat CBC/labs.          Vitals                                                                                                                                                                                                                                                                                                            Physical Exam    Constitutional:         Appearance: She is obese. She is not toxic-appearing.   HENT:        Head: Normocephalic and atraumatic.   Eyes:        Conjunctiva/sclera: Conjunctivae normal.      Pupils: Pupils are equal, round, and reactive to light.   Neck:        Musculoskeletal: Normal range of motion and neck supple.   Cardiovascular:        Rate and Rhythm: Normal rate. Rhythm irregular.      Heart sounds: No murmur.   Pulmonary:        Effort: Pulmonary effort is normal. No respiratory distress.      Breath sounds: Normal breath sounds. No wheezing.      Comments: On RA  Abdominal:      General: Abdomen is flat. There is no distension.      Palpations: Abdomen is soft.      Tenderness: There is no abdominal tenderness. There is no guarding or rebound.     Musculoskeletal: Normal range  of motion.           General: No swelling or tenderness.     Skin:       General: Skin is warm and dry.      Capillary Refill: Capillary refill takes less than 2 seconds.   Neurological:        General: No focal deficit present.      Mental Status: She is alert and oriented to person, place, and time.      Comments: A&Ox4   Psychiatric:         Mood and Affect: Mood normal.         Behavior: Behavior normal.         Thought Content: Thought content normal.         Judgment: Judgment normal.               Consults:               Consults (From admission, onward)                                   Status       Ordering Provider                       Inpatient consult to Hepatology  Once         Provider:  (Not yet assigned)          Completed     GREGORIA SANCHEZ                     Inpatient consult to Midline team  Once         Provider:  (Not yet assigned)          Completed     MAGDALENA STEEN                     Inpatient consult to Midline team  Once         Provider:  (Not yet assigned)          Completed     NI BUTLER                            No new Assessment & Plan notes have been filed under this hospital service since the last note was generated.    Service: Hospital Medicine                Final Active Diagnoses:             Diagnosis     Date Noted     POA            PRINCIPAL PROBLEM:  Sepsis due to Gram-negative organism with acute renal failure (A41.50, R65.20, N17.9)     06/27/2020     Yes            Discharge planning issues (Z02.9)     06/27/2020     Not Applicable            Debility (R53.81)     06/27/2020     Yes            DINO (acute kidney injury) (N17.9)     06/25/2020     Yes            Elevated LFTs (R94.5)     06/25/2020     Yes            Acute cholangitis (K83.09)     06/24/2020     Yes            Transaminitis (R74.0)     06/24/2020     Yes            PAF (paroxysmal atrial fibrillation) (I48.0)     05/01/2019     Yes            SSS (sick sinus  syndrome) (I49.5)     05/01/2019     Yes            Essential hypertension (I10)     07/23/2015     Yes            Diabetes mellitus with neuropathy (E11.40)     12/31/2012     Yes               Problems Resolved During this Admission:                Discharged Condition: fair         Disposition: home         Follow Up:          Follow-up Information                    Junior Godinez MD On 7/14/2020.        Specialty: Internal Medicine    Why: hospital follow up at 3:15 PM    Contact information:    [address]    [place] [address]    [phone]                                             Patient Instructions:                    CBC W/ AUTO DIFFERENTIAL       Standing Status:     Future     Standing Exp. Date:     08/28/21                    COMPREHENSIVE METABOLIC PANEL       Standing Status:     Future     Standing Exp. Date:     08/28/21                Significant Diagnostic Studies: Labs:     CMP            Recent Labs       Lab     06/28/20 0338     06/29/20 0413       NA     137     137       K     3.4*     3.2*       CL     102     101       CO2     21*     24       GLU     70     113*       BUN     27*     19       CREATININE     1.6*     1.1       CALCIUM     9.0     8.8       PROT     5.3*     5.4*       ALBUMIN     2.7*     2.9*       BILITOT     1.5*     1.1*       ALKPHOS     112     107       AST     44*     32       ALT     126*     94*       ANIONGAP     14     12       ESTGFRAFRICA     32.5*     51.1*       EGFRNONAA     28.2*     44.3*        and CBC            Recent Labs       Lab     06/28/20    0338     06/29/20    0413       WBC     4.32     4.26       HGB     12.6     12.8       HCT     36.7*     37.1       PLT     92*     93*                      Pending Diagnostic Studies:                None                      Medications:    Reconciled Home Medications:               Medication List                 START taking these medications          cefTRIAXone 2 g/50 mL Pgbk  IVPB    Commonly known as: ROCEPHIN    Inject 50 mLs (2 g total) into the vein once daily. Estimated end date: 7/8/2020                         CHANGE how you take these medications          diabetic supplies, miscellan. Misc    TRUEPLUS SUPER THIN 28G LANCET. USE AS DIRECTED TO TEST BLOOD SUGAR TWICE DAILY.    What changed: Another medication with the same name was removed. Continue taking this medication, and follow the directions you see here.                        CONTINUE taking these medications          acetaminophen 325 MG tablet    Commonly known as: TYLENOL    Take 325 mg by mouth every 6 (six) hours as needed for Pain.            alcohol swabs Padm    Apply 1 each topically once daily.            amLODIPine 2.5 MG tablet    Commonly known as: NORVASC    Take 2 tablets (5 mg total) by mouth once daily.            apixaban 2.5 mg Tab    Commonly known as: ELIQUIS    Take 1 tablet (2.5 mg total) by mouth 2 (two) times daily.            aspirin 81 MG EC tablet    Commonly known as: ECOTRIN    Take 1 tablet (81 mg total) by mouth once daily.            carvediloL 12.5 MG tablet    Commonly known as: COREG    Take 12.5 mg by mouth 2 (two) times daily.            cloNIDine 0.1 MG tablet    Commonly known as: CATAPRES    Take 0.1 mg by mouth as needed (hypertension).            furosemide 20 MG tablet    Commonly known as: LASIX    Take 1 tablet (20 mg total) by mouth every other day.            gabapentin 300 MG capsule    Commonly known as: NEURONTIN    Take 1 capsule (300 mg total) by mouth 3 (three) times daily.            irbesartan 150 MG tablet    Commonly known as: AVAPRO    Take 150 mg by mouth once daily.            LORazepam 0.5 MG tablet    Commonly known as: ATIVAN    TAKE 1 TABLET EVERY 8 HOURS AS NEEDED            metFORMIN 500 MG tablet    Commonly known as: GLUCOPHAGE    Take 1 tablet (500 mg total) by mouth every evening.            nitroGLYCERIN 0.4 MG SL tablet    Commonly known as:  NITROSTAT    Place 1 tablet (0.4 mg total) under the tongue every 5 (five) minutes as needed.            pantoprazole 40 MG tablet    Commonly known as: PROTONIX    Take 1 tablet (40 mg total) by mouth once daily.            potassium chloride SA 20 MEQ tablet    Commonly known as: K-DUR,KLOR-CON    Take 20 mEq by mouth once daily.            pravastatin 20 MG tablet    Commonly known as: PRAVACHOL    Take 1 tablet (20 mg total) by mouth nightly.            TRUE METRIX GLUCOSE TEST STRIP Strp    Generic drug: blood sugar diagnostic    USE TO TEST BLOOD SUGARS 2 TIMES DAILY.                                Indwelling Lines/Drains at time of discharge:           Lines/Drains/Airways                None                                              Time spent on the discharge of patient: 40 minutes    Patient was seen and examined on the date of discharge and determined to be suitable for discharge.                   Rebecca Whitley MD    Department of Hospital Medicine    [facility]    7/7/20  She is doing well .  Will be finishing her 14 days of IV therapy .      Review of Systems   Constitutional: Negative for chills and fever.   HENT: Negative for congestion, hearing loss, sinus pressure and sore throat.    Eyes: Negative for photophobia.   Respiratory: Negative for cough, choking, chest tightness, shortness of breath and wheezing.    Cardiovascular: Negative for chest pain and palpitations.   Gastrointestinal: Negative for blood in stool, nausea and vomiting.   Endocrine: Negative for polydipsia and polyphagia.   Genitourinary: Negative for dysuria and hematuria.   Musculoskeletal: Positive for gait problem and myalgias. Negative for arthralgias.        Foot pain left   Skin: Negative for pallor.   Neurological: Positive for numbness. Negative for dizziness.        Numbness and tingling in feet; on gabapentin ;wants to see neurology .   Hematological: Does not bruise/bleed easily.    Psychiatric/Behavioral: Negative for confusion and suicidal ideas. The patient is nervous/anxious.        Objective:      Physical Exam  Vitals signs and nursing note reviewed.   Constitutional:       Appearance: She is well-developed.   HENT:      Head: Normocephalic and atraumatic.      Right Ear: External ear normal.      Left Ear: External ear normal.   Eyes:      Conjunctiva/sclera: Conjunctivae normal.      Pupils: Pupils are equal, round, and reactive to light.   Neck:      Musculoskeletal: Normal range of motion and neck supple.      Thyroid: No thyromegaly.      Vascular: No JVD.      Trachea: No tracheal deviation.   Cardiovascular:      Rate and Rhythm: Normal rate and regular rhythm.      Pulses:           Dorsalis pedis pulses are 1+ on the right side and 1+ on the left side.        Posterior tibial pulses are 1+ on the right side and 1+ on the left side.      Heart sounds: Normal heart sounds.   Pulmonary:      Effort: Pulmonary effort is normal. No respiratory distress.      Breath sounds: Normal breath sounds. No wheezing or rales.   Chest:      Chest wall: No tenderness.   Abdominal:      General: Bowel sounds are normal. There is no distension.      Palpations: Abdomen is soft. There is no mass.      Tenderness: There is no abdominal tenderness. There is no guarding or rebound.   Musculoskeletal: Normal range of motion.   Feet:      Right foot:      Protective Sensation: 7 sites tested. 2 sites sensed.      Skin integrity: No ulcer, erythema or dry skin.      Left foot:      Protective Sensation: 7 sites tested. 2 sites sensed.      Skin integrity: No ulcer, erythema or dry skin.   Lymphadenopathy:      Cervical: No cervical adenopathy.   Skin:     General: Skin is warm and dry.   Neurological:      Mental Status: She is alert and oriented to person, place, and time.      Cranial Nerves: No cranial nerve deficit.      Motor: No abnormal muscle tone.      Coordination: Coordination normal.       Deep Tendon Reflexes: Reflexes are normal and symmetric.         Assessment:       1. Sepsis due to Gram-negative organism with acute renal failure    2. Elevated LFTs        Plan:   Erin was seen today for hospital follow up.    Diagnoses and all orders for this visit:    Sepsis due to Gram-negative organism with acute renal failure  -     Comprehensive metabolic panel; Future  Doing great   Will finish her rocephin 14 days course   Getting daily shots at home.    Elevated LFTs  -     Comprehensive metabolic panel; Future  Lab Results   Component Value Date    ALT 94 (H) 06/29/2020    AST 32 06/29/2020    ALKPHOS 107 06/29/2020    BILITOT 1.1 (H) 06/29/2020       Problem List Items Addressed This Visit     None

## 2020-07-08 ENCOUNTER — TELEPHONE (OUTPATIENT)
Dept: INTERNAL MEDICINE | Facility: CLINIC | Age: 85
End: 2020-07-08

## 2020-07-08 NOTE — TELEPHONE ENCOUNTER
----- Message from Lidia Ogden sent at 2020  1:54 PM CDT -----  Regarding: IV Antibiotics  Contact: Rosalina with [facility]  MRN: [id number]  : 1929  PCP: Junior Godinez  Home Phone      [phone]  Work Phone      Not on file.  Mobile          Not on file.      MESSAGE:    She request to speak to a nurse regarding IV antibiotics - patient ended today / unsure if IV medication is to continue per PCP.    Phone # [phone]    Pharmacy - [facility] - [facility] [address]

## 2020-07-14 ENCOUNTER — DOCUMENT SCAN (OUTPATIENT)
Dept: HOME HEALTH SERVICES | Facility: HOSPITAL | Age: 85
End: 2020-07-14
Payer: MEDICARE

## 2020-07-17 ENCOUNTER — TELEPHONE (OUTPATIENT)
Dept: ENDOSCOPY | Facility: HOSPITAL | Age: 85
End: 2020-07-17

## 2020-07-17 NOTE — TELEPHONE ENCOUNTER
I spoke to patient to see if she is interested in schedule EUS/ERCP. She stated that she did not want to schedule. She did have a fever yesterday and is going to an urgent care to get tested for COVID. Also she has labs scheduled next week with her PCP.

## 2020-08-05 ENCOUNTER — TELEPHONE (OUTPATIENT)
Dept: INTERNAL MEDICINE | Facility: CLINIC | Age: 85
End: 2020-08-05

## 2020-08-05 NOTE — TELEPHONE ENCOUNTER
----- Message from Dana Kamara sent at 2020  1:06 PM CDT -----  Contact: Kirti[facility]  MRN: [id number]  : 1929  PCP: Junior Godinez  Home Phone      [phone]  Work Phone      Not on file.  Mobile          Not on file.      MESSAGE:   Would like to speak to nurse about getting orders for her labs sent over to them so that they can draw her labs prior to her visit.  Please send to fax number below.    Phone: [phone]  Fax [phone]

## 2020-08-12 LAB
CHOLEST/HDLC SERPL: 1.9 {RATIO}
CHOLESTEROL, TOTAL: 152
HDLC SERPL-MCNC: 80 MG/DL (ref 35–70)
LDL CHOLESTEROL DIRECT: 60 MG/DL
TRIGL SERPL-MCNC: 59 MG/DL
VLDL CHOLESTEROL: 11.8 MG/DL

## 2020-08-14 ENCOUNTER — DOCUMENT SCAN (OUTPATIENT)
Dept: HOME HEALTH SERVICES | Facility: HOSPITAL | Age: 85
End: 2020-08-14
Payer: MEDICARE

## 2020-08-31 ENCOUNTER — TELEPHONE (OUTPATIENT)
Dept: INTERNAL MEDICINE | Facility: CLINIC | Age: 85
End: 2020-08-31

## 2020-08-31 DIAGNOSIS — E11.40 TYPE 2 DIABETES MELLITUS WITH DIABETIC NEUROPATHY, WITHOUT LONG-TERM CURRENT USE OF INSULIN: ICD-10-CM

## 2020-08-31 DIAGNOSIS — I10 ESSENTIAL HYPERTENSION: Primary | ICD-10-CM

## 2020-08-31 DIAGNOSIS — E78.5 HYPERLIPIDEMIA, UNSPECIFIED HYPERLIPIDEMIA TYPE: ICD-10-CM

## 2020-08-31 NOTE — TELEPHONE ENCOUNTER
Spoke with Kirti with [facility] and she states that the pt is having a BMP drawn for Dr. Peralta today and the pt wants to know if you want her to have any labs drawn before her appt on 9/14/2020.     Please advise.

## 2020-08-31 NOTE — TELEPHONE ENCOUNTER
Spoke with Amy and she states that they just unique lab orders that Dr. Godinez ordered on 8/14/2020. Informed Amy that I didn't see anything and requested the labs to be faxed again. Amy is faxing the labs. Awaiting labs for Dr. Godinez to review.

## 2020-08-31 NOTE — TELEPHONE ENCOUNTER
Labs received from 8/14/2020. Dr. Godinez reviewed them and states that we do not need any labs drawn. Informed Amy to disregard the orders that were sent to her. Amy verbalized understanding.

## 2020-08-31 NOTE — TELEPHONE ENCOUNTER
Lab orders faxed to Kirti at [facility] so they can draw the labs on the pt. Fax confirmation received.

## 2020-08-31 NOTE — TELEPHONE ENCOUNTER
----- Message from Lidia Akinjen sent at 2020  9:13 AM CDT -----  Regarding: Home Health Visit  Contact: Kirti with [facility]  Erin Sahu  MRN: [id number]  : 1929  PCP: Junior Godinez  Home Phone      [phone]  Work Phone      Not on file.  Mobile          Not on file.      MESSAGE:    Request to speak to a nurse regarding lab orders. The home health nurse is scheduled to see the patient today between 11:00 AM - 11:30 AM. Patient is scheduled to have labs drawn today for Dr. Peralta. Patient would like to have labs for Dr. Godinez drawn on the same visit.     Phone # [phone]    Pharmacy - [facility] - [facility] [address]

## 2020-08-31 NOTE — TELEPHONE ENCOUNTER
----- Message from Lidia Ogden sent at 2020  1:06 PM CDT -----  Regarding: Clarification of lab orders  Contact: Amy frost [facility]  Erin Sahu  MRN: [id number]  : 1929  PCP: Junior Godinez  Home Phone      [phone]  Work Phone      Not on file.  Mobile          Not on file.      MESSAGE:    Request clarification of lab orders sent to [facility] today.     Phone # [phone]    Pharmacy - [facility] - [facility] [address]

## 2020-09-09 ENCOUNTER — TELEPHONE (OUTPATIENT)
Dept: INTERNAL MEDICINE | Facility: CLINIC | Age: 85
End: 2020-09-09

## 2020-09-09 NOTE — TELEPHONE ENCOUNTER
----- Message from Dana Kamara sent at 2020  2:14 PM CDT -----  Contact: Kirti[facility]  Erin Sahu  MRN: [id number]  : 1929  PCP: Junior Godinez  Home Phone      [phone]  Work Phone      Not on file.  Mobile          Not on file.    MESSAGE:     Would like to speak to nurse concerning labs.  Wanted to see if Dr. Godinez wanted to repeat her labs since she just had a month ago. Please call to advise.    Phone: [phone]

## 2020-09-10 ENCOUNTER — TELEPHONE (OUTPATIENT)
Dept: INTERNAL MEDICINE | Facility: CLINIC | Age: 85
End: 2020-09-10

## 2020-09-10 ENCOUNTER — PATIENT OUTREACH (OUTPATIENT)
Dept: ADMINISTRATIVE | Facility: HOSPITAL | Age: 85
End: 2020-09-10

## 2020-09-12 ENCOUNTER — HOSPITAL ENCOUNTER (EMERGENCY)
Facility: HOSPITAL | Age: 85
Discharge: ANOTHER HOSPITAL | End: 2020-09-12
Attending: SURGERY
Payer: MEDICARE

## 2020-09-12 VITALS
HEART RATE: 63 BPM | TEMPERATURE: 99 F | SYSTOLIC BLOOD PRESSURE: 121 MMHG | RESPIRATION RATE: 20 BRPM | OXYGEN SATURATION: 100 % | DIASTOLIC BLOOD PRESSURE: 65 MMHG | BODY MASS INDEX: 30.24 KG/M2 | WEIGHT: 176.19 LBS

## 2020-09-12 DIAGNOSIS — R00.0 TACHYCARDIA: ICD-10-CM

## 2020-09-12 DIAGNOSIS — A41.9 SEPSIS, DUE TO UNSPECIFIED ORGANISM, UNSPECIFIED WHETHER ACUTE ORGAN DYSFUNCTION PRESENT: Primary | ICD-10-CM

## 2020-09-12 LAB
ALBUMIN SERPL BCP-MCNC: 2.9 G/DL (ref 3.5–5.2)
ALLENS TEST: ABNORMAL
ALP SERPL-CCNC: 242 U/L (ref 55–135)
ALT SERPL W/O P-5'-P-CCNC: 50 U/L (ref 10–44)
ANION GAP SERPL CALC-SCNC: 13 MMOL/L (ref 8–16)
APTT BLDCRRT: 31.3 SEC (ref 21–32)
AST SERPL-CCNC: 30 U/L (ref 10–40)
BASOPHILS # BLD AUTO: 0.01 K/UL (ref 0–0.2)
BASOPHILS NFR BLD: 0.2 % (ref 0–1.9)
BILIRUB SERPL-MCNC: 1.7 MG/DL (ref 0.1–1)
BILIRUB UR QL STRIP: ABNORMAL
BNP SERPL-MCNC: 158 PG/ML (ref 0–99)
BUN SERPL-MCNC: 38 MG/DL (ref 10–30)
CALCIUM SERPL-MCNC: 8.5 MG/DL (ref 8.7–10.5)
CHLORIDE SERPL-SCNC: 91 MMOL/L (ref 95–110)
CK MB SERPL-MCNC: 2 NG/ML (ref 0.1–6.5)
CK MB SERPL-RTO: 1.7 % (ref 0–5)
CK SERPL-CCNC: 115 U/L (ref 20–180)
CK SERPL-CCNC: 115 U/L (ref 20–180)
CLARITY UR: CLEAR
CO2 SERPL-SCNC: 25 MMOL/L (ref 23–29)
COLOR UR: YELLOW
CREAT SERPL-MCNC: 1.6 MG/DL (ref 0.5–1.4)
CRP SERPL-MCNC: 155.6 MG/L (ref 0–8.2)
D DIMER PPP IA.FEU-MCNC: 0.76 MG/L FEU
DELSYS: ABNORMAL
DIFFERENTIAL METHOD: ABNORMAL
EOSINOPHIL # BLD AUTO: 0 K/UL (ref 0–0.5)
EOSINOPHIL NFR BLD: 0.5 % (ref 0–8)
ERYTHROCYTE [DISTWIDTH] IN BLOOD BY AUTOMATED COUNT: 12.7 % (ref 11.5–14.5)
ERYTHROCYTE [SEDIMENTATION RATE] IN BLOOD BY WESTERGREN METHOD: 34 MM/HR (ref 0–20)
EST. GFR  (AFRICAN AMERICAN): 32 ML/MIN/1.73 M^2
EST. GFR  (NON AFRICAN AMERICAN): 28 ML/MIN/1.73 M^2
FERRITIN SERPL-MCNC: 409 NG/ML (ref 20–300)
GLUCOSE SERPL-MCNC: 203 MG/DL (ref 70–110)
GLUCOSE UR QL STRIP: NEGATIVE
HCO3 UR-SCNC: 22.9 MMOL/L (ref 22–26)
HCT VFR BLD AUTO: 36.3 % (ref 37–48.5)
HGB BLD-MCNC: 12.8 G/DL (ref 12–16)
HGB UR QL STRIP: NEGATIVE
IMM GRANULOCYTES # BLD AUTO: 0.06 K/UL (ref 0–0.04)
IMM GRANULOCYTES NFR BLD AUTO: 0.9 % (ref 0–0.5)
INR PPP: 1.1 (ref 0.8–1.2)
KETONES UR QL STRIP: NEGATIVE
LACTATE SERPL-SCNC: 1.1 MMOL/L (ref 0.5–2.2)
LACTATE SERPL-SCNC: 3.9 MMOL/L (ref 0.5–2.2)
LDH SERPL L TO P-CCNC: 156 U/L (ref 110–260)
LEUKOCYTE ESTERASE UR QL STRIP: NEGATIVE
LIPASE SERPL-CCNC: 10 U/L (ref 4–60)
LYMPHOCYTES # BLD AUTO: 0.3 K/UL (ref 1–4.8)
LYMPHOCYTES NFR BLD: 4 % (ref 18–48)
MAGNESIUM SERPL-MCNC: 1.2 MG/DL (ref 1.6–2.6)
MCH RBC QN AUTO: 33.2 PG (ref 27–31)
MCHC RBC AUTO-ENTMCNC: 35.3 G/DL (ref 32–36)
MCV RBC AUTO: 94 FL (ref 82–98)
MONOCYTES # BLD AUTO: 0.1 K/UL (ref 0.3–1)
MONOCYTES NFR BLD: 1.9 % (ref 4–15)
NEUTROPHILS # BLD AUTO: 6 K/UL (ref 1.8–7.7)
NEUTROPHILS NFR BLD: 92.5 % (ref 38–73)
NITRITE UR QL STRIP: NEGATIVE
NRBC BLD-RTO: 0 /100 WBC
PCO2 BLDA: 33 MMHG (ref 35–45)
PH SMN: 7.45 [PH] (ref 7.35–7.45)
PH UR STRIP: 5 [PH] (ref 5–8)
PHOSPHATE SERPL-MCNC: 2.7 MG/DL (ref 2.7–4.5)
PLATELET # BLD AUTO: 109 K/UL (ref 150–350)
PMV BLD AUTO: 9.9 FL (ref 9.2–12.9)
PO2 BLDA: 52 MMHG (ref 75–100)
POC BE: -0.5 MMOL/L (ref -2–2)
POC COHB: 1.6 % (ref 0–3)
POC METHB: 1 % (ref 0–1.5)
POC O2HB ARTERIAL: 88.7 % (ref 94–100)
POC SATURATED O2: 91.2 % (ref 90–100)
POC TCO2: 23.9 MMOL/L
POC THB: 12.5 G/DL (ref 12–18)
POTASSIUM SERPL-SCNC: 3.7 MMOL/L (ref 3.5–5.1)
PROCALCITONIN SERPL IA-MCNC: 12.07 NG/ML
PROT SERPL-MCNC: 6 G/DL (ref 6–8.4)
PROT UR QL STRIP: NEGATIVE
PROTHROMBIN TIME: 11.8 SEC (ref 9–12.5)
RBC # BLD AUTO: 3.86 M/UL (ref 4–5.4)
SARS-COV-2 RDRP RESP QL NAA+PROBE: NEGATIVE
SITE: ABNORMAL
SODIUM SERPL-SCNC: 129 MMOL/L (ref 136–145)
SP GR UR STRIP: 1.01 (ref 1–1.03)
TROPONIN I SERPL DL<=0.01 NG/ML-MCNC: 0.01 NG/ML (ref 0–0.03)
TSH SERPL DL<=0.005 MIU/L-ACNC: 2.64 UIU/ML (ref 0.4–4)
URN SPEC COLLECT METH UR: ABNORMAL
UROBILINOGEN UR STRIP-ACNC: ABNORMAL EU/DL
WBC # BLD AUTO: 6.45 K/UL (ref 3.9–12.7)

## 2020-09-12 PROCEDURE — 80053 COMPREHEN METABOLIC PANEL: CPT

## 2020-09-12 PROCEDURE — 25000003 PHARM REV CODE 250: Performed by: SURGERY

## 2020-09-12 PROCEDURE — 85730 THROMBOPLASTIN TIME PARTIAL: CPT

## 2020-09-12 PROCEDURE — 96366 THER/PROPH/DIAG IV INF ADDON: CPT

## 2020-09-12 PROCEDURE — 84145 PROCALCITONIN (PCT): CPT

## 2020-09-12 PROCEDURE — 36600 WITHDRAWAL OF ARTERIAL BLOOD: CPT

## 2020-09-12 PROCEDURE — 86140 C-REACTIVE PROTEIN: CPT

## 2020-09-12 PROCEDURE — 83690 ASSAY OF LIPASE: CPT

## 2020-09-12 PROCEDURE — 85651 RBC SED RATE NONAUTOMATED: CPT

## 2020-09-12 PROCEDURE — 82803 BLOOD GASES ANY COMBINATION: CPT | Performed by: SURGERY

## 2020-09-12 PROCEDURE — 87077 CULTURE AEROBIC IDENTIFY: CPT

## 2020-09-12 PROCEDURE — 83735 ASSAY OF MAGNESIUM: CPT

## 2020-09-12 PROCEDURE — 83615 LACTATE (LD) (LDH) ENZYME: CPT

## 2020-09-12 PROCEDURE — 96367 TX/PROPH/DG ADDL SEQ IV INF: CPT | Mod: XS

## 2020-09-12 PROCEDURE — 25000003 PHARM REV CODE 250

## 2020-09-12 PROCEDURE — 96365 THER/PROPH/DIAG IV INF INIT: CPT

## 2020-09-12 PROCEDURE — 82728 ASSAY OF FERRITIN: CPT

## 2020-09-12 PROCEDURE — 93010 EKG 12-LEAD: ICD-10-PCS | Mod: 76,,, | Performed by: INTERNAL MEDICINE

## 2020-09-12 PROCEDURE — U0002 COVID-19 LAB TEST NON-CDC: HCPCS

## 2020-09-12 PROCEDURE — 99285 EMERGENCY DEPT VISIT HI MDM: CPT | Mod: 25

## 2020-09-12 PROCEDURE — 81003 URINALYSIS AUTO W/O SCOPE: CPT

## 2020-09-12 PROCEDURE — 85025 COMPLETE CBC W/AUTO DIFF WBC: CPT

## 2020-09-12 PROCEDURE — 87186 SC STD MICRODIL/AGAR DIL: CPT

## 2020-09-12 PROCEDURE — 63600175 PHARM REV CODE 636 W HCPCS: Performed by: SURGERY

## 2020-09-12 PROCEDURE — 93005 ELECTROCARDIOGRAM TRACING: CPT

## 2020-09-12 PROCEDURE — 85610 PROTHROMBIN TIME: CPT

## 2020-09-12 PROCEDURE — 82550 ASSAY OF CK (CPK): CPT

## 2020-09-12 PROCEDURE — 84484 ASSAY OF TROPONIN QUANT: CPT

## 2020-09-12 PROCEDURE — 96361 HYDRATE IV INFUSION ADD-ON: CPT

## 2020-09-12 PROCEDURE — 83605 ASSAY OF LACTIC ACID: CPT | Mod: 91

## 2020-09-12 PROCEDURE — 93010 ELECTROCARDIOGRAM REPORT: CPT | Mod: ,,, | Performed by: INTERNAL MEDICINE

## 2020-09-12 PROCEDURE — 84443 ASSAY THYROID STIM HORMONE: CPT

## 2020-09-12 PROCEDURE — 83880 ASSAY OF NATRIURETIC PEPTIDE: CPT

## 2020-09-12 PROCEDURE — 82553 CREATINE MB FRACTION: CPT

## 2020-09-12 PROCEDURE — 85379 FIBRIN DEGRADATION QUANT: CPT

## 2020-09-12 PROCEDURE — 87040 BLOOD CULTURE FOR BACTERIA: CPT | Mod: 59

## 2020-09-12 PROCEDURE — 84100 ASSAY OF PHOSPHORUS: CPT

## 2020-09-12 PROCEDURE — 36415 COLL VENOUS BLD VENIPUNCTURE: CPT

## 2020-09-12 RX ORDER — VANCOMYCIN HYDROCHLORIDE 1 G/20ML
INJECTION, POWDER, LYOPHILIZED, FOR SOLUTION INTRAVENOUS
Status: COMPLETED
Start: 2020-09-12 | End: 2020-09-12

## 2020-09-12 RX ORDER — NOREPINEPHRINE BITARTRATE/D5W 4MG/250ML
PLASTIC BAG, INJECTION (ML) INTRAVENOUS
Status: DISCONTINUED
Start: 2020-09-12 | End: 2020-09-13 | Stop reason: HOSPADM

## 2020-09-12 RX ADMIN — SODIUM CHLORIDE 1000 ML: 0.9 INJECTION, SOLUTION INTRAVENOUS at 05:09

## 2020-09-12 RX ADMIN — CEFTRIAXONE 2 G: 2 INJECTION, SOLUTION INTRAVENOUS at 06:09

## 2020-09-12 RX ADMIN — VANCOMYCIN HYDROCHLORIDE 2000 MG: 500 INJECTION, POWDER, LYOPHILIZED, FOR SOLUTION INTRAVENOUS at 04:09

## 2020-09-12 RX ADMIN — PIPERACILLIN AND TAZOBACTAM 4.5 G: 4; .5 INJECTION, POWDER, FOR SOLUTION INTRAVENOUS at 05:09

## 2020-09-12 RX ADMIN — NOREPINEPHRINE BITARTRATE 0.02 MCG/KG/MIN: 1 INJECTION, SOLUTION, CONCENTRATE INTRAVENOUS at 05:09

## 2020-09-12 RX ADMIN — SODIUM CHLORIDE 1000 ML: 0.9 INJECTION, SOLUTION INTRAVENOUS at 04:09

## 2020-09-12 NOTE — ED NOTES
Awake and alert. Levophed 4 mg infusing @ 0.02 mcg/kg/min to CVC right femoral. Rocephin 2 mg infusing to CVC. 2 L NC. Oxygen saturation 96 %. Cardiac monitor, heart rate and oxygen saturation monitor. HOB 45 degrees. Family at bedside. Bed in lowest position with side rails up.

## 2020-09-12 NOTE — ED TRIAGE NOTES
Pt presents with C/O increase in SOB over the past few days and low grade fever per home health nurse

## 2020-09-12 NOTE — ED NOTES
MD at bedside, RT at bedside, charge nurse at bedside.  Patient placed on 100% non rebreather, placed in trendelenberg position

## 2020-09-12 NOTE — ED NOTES
Daughter came to nurses station to report that patient was beginning to itch and appeared to have red blotches on her face.  MD notified.  Patient's blood pressure dropped suddenly at this time. Patient's oxygen saturation started dropping at this time.

## 2020-09-12 NOTE — ED NOTES
Leveled patient's position to 45 degree head elevation.  Changed patient to 2L NC.  Patient tolerating well.  Will continue to monitor.

## 2020-09-13 ENCOUNTER — HOSPITAL ENCOUNTER (INPATIENT)
Facility: OTHER | Age: 85
LOS: 9 days | Discharge: NURSING, REHAB OR LONG-TERM CARE | DRG: 871 | End: 2020-09-22
Attending: HOSPITALIST | Admitting: HOSPITALIST
Payer: MEDICARE

## 2020-09-13 DIAGNOSIS — A41.52 SEPSIS DUE TO PSEUDOMONAS SPECIES: ICD-10-CM

## 2020-09-13 DIAGNOSIS — A41.9 SEPSIS: ICD-10-CM

## 2020-09-13 LAB
ALBUMIN SERPL BCP-MCNC: 2.4 G/DL (ref 3.5–5.2)
ALBUMIN SERPL BCP-MCNC: 2.6 G/DL (ref 3.5–5.2)
ALP SERPL-CCNC: 164 U/L (ref 55–135)
ALP SERPL-CCNC: 174 U/L (ref 55–135)
ALT SERPL W/O P-5'-P-CCNC: 35 U/L (ref 10–44)
ALT SERPL W/O P-5'-P-CCNC: 40 U/L (ref 10–44)
ANION GAP SERPL CALC-SCNC: 10 MMOL/L (ref 8–16)
ANION GAP SERPL CALC-SCNC: 11 MMOL/L (ref 8–16)
AST SERPL-CCNC: 18 U/L (ref 10–40)
AST SERPL-CCNC: 20 U/L (ref 10–40)
BASOPHILS # BLD AUTO: 0.02 K/UL (ref 0–0.2)
BASOPHILS # BLD AUTO: 0.02 K/UL (ref 0–0.2)
BASOPHILS NFR BLD: 0.2 % (ref 0–1.9)
BASOPHILS NFR BLD: 0.2 % (ref 0–1.9)
BILIRUB SERPL-MCNC: 0.8 MG/DL (ref 0.1–1)
BILIRUB SERPL-MCNC: 1 MG/DL (ref 0.1–1)
BUN SERPL-MCNC: 29 MG/DL (ref 10–30)
BUN SERPL-MCNC: 33 MG/DL (ref 10–30)
CALCIUM SERPL-MCNC: 8 MG/DL (ref 8.7–10.5)
CALCIUM SERPL-MCNC: 8 MG/DL (ref 8.7–10.5)
CHLORIDE SERPL-SCNC: 98 MMOL/L (ref 95–110)
CHLORIDE SERPL-SCNC: 99 MMOL/L (ref 95–110)
CO2 SERPL-SCNC: 22 MMOL/L (ref 23–29)
CO2 SERPL-SCNC: 23 MMOL/L (ref 23–29)
CREAT SERPL-MCNC: 1.1 MG/DL (ref 0.5–1.4)
CREAT SERPL-MCNC: 1.3 MG/DL (ref 0.5–1.4)
DIFFERENTIAL METHOD: ABNORMAL
DIFFERENTIAL METHOD: ABNORMAL
EOSINOPHIL # BLD AUTO: 0.1 K/UL (ref 0–0.5)
EOSINOPHIL # BLD AUTO: 0.2 K/UL (ref 0–0.5)
EOSINOPHIL NFR BLD: 0.8 % (ref 0–8)
EOSINOPHIL NFR BLD: 2 % (ref 0–8)
ERYTHROCYTE [DISTWIDTH] IN BLOOD BY AUTOMATED COUNT: 12.7 % (ref 11.5–14.5)
ERYTHROCYTE [DISTWIDTH] IN BLOOD BY AUTOMATED COUNT: 12.9 % (ref 11.5–14.5)
EST. GFR  (AFRICAN AMERICAN): 41 ML/MIN/1.73 M^2
EST. GFR  (AFRICAN AMERICAN): 51 ML/MIN/1.73 M^2
EST. GFR  (NON AFRICAN AMERICAN): 36 ML/MIN/1.73 M^2
EST. GFR  (NON AFRICAN AMERICAN): 44 ML/MIN/1.73 M^2
ESTIMATED AVG GLUCOSE: 146 MG/DL (ref 68–131)
GLUCOSE SERPL-MCNC: 164 MG/DL (ref 70–110)
GLUCOSE SERPL-MCNC: 190 MG/DL (ref 70–110)
HBA1C MFR BLD HPLC: 6.7 % (ref 4–5.6)
HCT VFR BLD AUTO: 33.3 % (ref 37–48.5)
HCT VFR BLD AUTO: 34.8 % (ref 37–48.5)
HGB BLD-MCNC: 11.6 G/DL (ref 12–16)
HGB BLD-MCNC: 12.3 G/DL (ref 12–16)
IMM GRANULOCYTES # BLD AUTO: 0.05 K/UL (ref 0–0.04)
IMM GRANULOCYTES # BLD AUTO: 0.06 K/UL (ref 0–0.04)
IMM GRANULOCYTES NFR BLD AUTO: 0.5 % (ref 0–0.5)
IMM GRANULOCYTES NFR BLD AUTO: 0.7 % (ref 0–0.5)
LACTATE SERPL-SCNC: 1.2 MMOL/L (ref 0.5–2.2)
LYMPHOCYTES # BLD AUTO: 1.3 K/UL (ref 1–4.8)
LYMPHOCYTES # BLD AUTO: 1.3 K/UL (ref 1–4.8)
LYMPHOCYTES NFR BLD: 14 % (ref 18–48)
LYMPHOCYTES NFR BLD: 15.5 % (ref 18–48)
MAGNESIUM SERPL-MCNC: 1.1 MG/DL (ref 1.6–2.6)
MAGNESIUM SERPL-MCNC: 1.5 MG/DL (ref 1.6–2.6)
MCH RBC QN AUTO: 32.7 PG (ref 27–31)
MCH RBC QN AUTO: 33.3 PG (ref 27–31)
MCHC RBC AUTO-ENTMCNC: 34.8 G/DL (ref 32–36)
MCHC RBC AUTO-ENTMCNC: 35.3 G/DL (ref 32–36)
MCV RBC AUTO: 94 FL (ref 82–98)
MCV RBC AUTO: 94 FL (ref 82–98)
MONOCYTES # BLD AUTO: 0.8 K/UL (ref 0.3–1)
MONOCYTES # BLD AUTO: 0.8 K/UL (ref 0.3–1)
MONOCYTES NFR BLD: 9 % (ref 4–15)
MONOCYTES NFR BLD: 9.9 % (ref 4–15)
NEUTROPHILS # BLD AUTO: 6 K/UL (ref 1.8–7.7)
NEUTROPHILS # BLD AUTO: 6.9 K/UL (ref 1.8–7.7)
NEUTROPHILS NFR BLD: 71.7 % (ref 38–73)
NEUTROPHILS NFR BLD: 75.5 % (ref 38–73)
NRBC BLD-RTO: 0 /100 WBC
NRBC BLD-RTO: 0 /100 WBC
PHOSPHATE SERPL-MCNC: 2.5 MG/DL (ref 2.7–4.5)
PHOSPHATE SERPL-MCNC: 2.7 MG/DL (ref 2.7–4.5)
PLATELET # BLD AUTO: 102 K/UL (ref 150–350)
PLATELET # BLD AUTO: 103 K/UL (ref 150–350)
PLATELET BLD QL SMEAR: ABNORMAL
PMV BLD AUTO: 10.3 FL (ref 9.2–12.9)
PMV BLD AUTO: 9.8 FL (ref 9.2–12.9)
POCT GLUCOSE: 172 MG/DL (ref 70–110)
POCT GLUCOSE: 173 MG/DL (ref 70–110)
POCT GLUCOSE: 210 MG/DL (ref 70–110)
POTASSIUM SERPL-SCNC: 3.4 MMOL/L (ref 3.5–5.1)
POTASSIUM SERPL-SCNC: 3.5 MMOL/L (ref 3.5–5.1)
PROCALCITONIN SERPL IA-MCNC: 10.67 NG/ML
PROT SERPL-MCNC: 5 G/DL (ref 6–8.4)
PROT SERPL-MCNC: 5.3 G/DL (ref 6–8.4)
RBC # BLD AUTO: 3.55 M/UL (ref 4–5.4)
RBC # BLD AUTO: 3.69 M/UL (ref 4–5.4)
SODIUM SERPL-SCNC: 131 MMOL/L (ref 136–145)
SODIUM SERPL-SCNC: 132 MMOL/L (ref 136–145)
WBC # BLD AUTO: 8.32 K/UL (ref 3.9–12.7)
WBC # BLD AUTO: 9.19 K/UL (ref 3.9–12.7)

## 2020-09-13 PROCEDURE — 63600175 PHARM REV CODE 636 W HCPCS: Performed by: NURSE PRACTITIONER

## 2020-09-13 PROCEDURE — 84100 ASSAY OF PHOSPHORUS: CPT

## 2020-09-13 PROCEDURE — 85025 COMPLETE CBC W/AUTO DIFF WBC: CPT

## 2020-09-13 PROCEDURE — 25000003 PHARM REV CODE 250: Performed by: HOSPITALIST

## 2020-09-13 PROCEDURE — 83735 ASSAY OF MAGNESIUM: CPT

## 2020-09-13 PROCEDURE — 83605 ASSAY OF LACTIC ACID: CPT

## 2020-09-13 PROCEDURE — 25000003 PHARM REV CODE 250: Performed by: NURSE PRACTITIONER

## 2020-09-13 PROCEDURE — 80053 COMPREHEN METABOLIC PANEL: CPT | Mod: 91

## 2020-09-13 PROCEDURE — 99223 1ST HOSP IP/OBS HIGH 75: CPT | Mod: AI,,, | Performed by: HOSPITALIST

## 2020-09-13 PROCEDURE — 83036 HEMOGLOBIN GLYCOSYLATED A1C: CPT

## 2020-09-13 PROCEDURE — 20000000 HC ICU ROOM

## 2020-09-13 PROCEDURE — 63600175 PHARM REV CODE 636 W HCPCS: Performed by: HOSPITALIST

## 2020-09-13 PROCEDURE — 84145 PROCALCITONIN (PCT): CPT

## 2020-09-13 PROCEDURE — 99223 PR INITIAL HOSPITAL CARE,LEVL III: ICD-10-PCS | Mod: AI,,, | Performed by: HOSPITALIST

## 2020-09-13 RX ORDER — ONDANSETRON 8 MG/1
8 TABLET, ORALLY DISINTEGRATING ORAL EVERY 8 HOURS PRN
Status: DISCONTINUED | OUTPATIENT
Start: 2020-09-13 | End: 2020-09-22 | Stop reason: HOSPADM

## 2020-09-13 RX ORDER — ACETAMINOPHEN 325 MG/1
650 TABLET ORAL EVERY 4 HOURS PRN
Status: DISCONTINUED | OUTPATIENT
Start: 2020-09-13 | End: 2020-09-22 | Stop reason: HOSPADM

## 2020-09-13 RX ORDER — MAGNESIUM SULFATE HEPTAHYDRATE 40 MG/ML
4 INJECTION, SOLUTION INTRAVENOUS ONCE
Status: COMPLETED | OUTPATIENT
Start: 2020-09-13 | End: 2020-09-13

## 2020-09-13 RX ORDER — IBUPROFEN 200 MG
16 TABLET ORAL
Status: DISCONTINUED | OUTPATIENT
Start: 2020-09-13 | End: 2020-09-22 | Stop reason: HOSPADM

## 2020-09-13 RX ORDER — INSULIN ASPART 100 [IU]/ML
1-10 INJECTION, SOLUTION INTRAVENOUS; SUBCUTANEOUS
Status: DISCONTINUED | OUTPATIENT
Start: 2020-09-13 | End: 2020-09-22 | Stop reason: HOSPADM

## 2020-09-13 RX ORDER — ASPIRIN 81 MG/1
81 TABLET ORAL DAILY
Status: DISCONTINUED | OUTPATIENT
Start: 2020-09-13 | End: 2020-09-22 | Stop reason: HOSPADM

## 2020-09-13 RX ORDER — CARVEDILOL 12.5 MG/1
12.5 TABLET ORAL 2 TIMES DAILY
Status: DISCONTINUED | OUTPATIENT
Start: 2020-09-13 | End: 2020-09-22 | Stop reason: HOSPADM

## 2020-09-13 RX ORDER — PRAVASTATIN SODIUM 20 MG/1
20 TABLET ORAL NIGHTLY
Status: DISCONTINUED | OUTPATIENT
Start: 2020-09-13 | End: 2020-09-22 | Stop reason: HOSPADM

## 2020-09-13 RX ORDER — SODIUM CHLORIDE, SODIUM LACTATE, POTASSIUM CHLORIDE, CALCIUM CHLORIDE 600; 310; 30; 20 MG/100ML; MG/100ML; MG/100ML; MG/100ML
INJECTION, SOLUTION INTRAVENOUS CONTINUOUS
Status: DISCONTINUED | OUTPATIENT
Start: 2020-09-13 | End: 2020-09-22 | Stop reason: HOSPADM

## 2020-09-13 RX ORDER — IBUPROFEN 200 MG
24 TABLET ORAL
Status: DISCONTINUED | OUTPATIENT
Start: 2020-09-13 | End: 2020-09-22 | Stop reason: HOSPADM

## 2020-09-13 RX ORDER — SODIUM CHLORIDE 0.9 % (FLUSH) 0.9 %
10 SYRINGE (ML) INJECTION
Status: DISCONTINUED | OUTPATIENT
Start: 2020-09-13 | End: 2020-09-22 | Stop reason: HOSPADM

## 2020-09-13 RX ORDER — PANTOPRAZOLE SODIUM 40 MG/1
40 TABLET, DELAYED RELEASE ORAL DAILY
Status: DISCONTINUED | OUTPATIENT
Start: 2020-09-13 | End: 2020-09-22 | Stop reason: HOSPADM

## 2020-09-13 RX ORDER — POTASSIUM CHLORIDE 20 MEQ/1
40 TABLET, EXTENDED RELEASE ORAL ONCE
Status: COMPLETED | OUTPATIENT
Start: 2020-09-13 | End: 2020-09-13

## 2020-09-13 RX ORDER — GLUCAGON 1 MG
1 KIT INJECTION
Status: DISCONTINUED | OUTPATIENT
Start: 2020-09-13 | End: 2020-09-22 | Stop reason: HOSPADM

## 2020-09-13 RX ADMIN — SODIUM CHLORIDE, SODIUM LACTATE, POTASSIUM CHLORIDE, AND CALCIUM CHLORIDE: .6; .31; .03; .02 INJECTION, SOLUTION INTRAVENOUS at 12:09

## 2020-09-13 RX ADMIN — PIPERACILLIN SODIUM AND TAZOBACTAM SODIUM 4.5 G: 4; .5 INJECTION, POWDER, LYOPHILIZED, FOR SOLUTION INTRAVENOUS at 02:09

## 2020-09-13 RX ADMIN — PRAVASTATIN SODIUM 20 MG: 20 TABLET ORAL at 09:09

## 2020-09-13 RX ADMIN — INSULIN ASPART 2 UNITS: 100 INJECTION, SOLUTION INTRAVENOUS; SUBCUTANEOUS at 04:09

## 2020-09-13 RX ADMIN — ACETAMINOPHEN 650 MG: 325 TABLET, FILM COATED ORAL at 08:09

## 2020-09-13 RX ADMIN — POTASSIUM CHLORIDE 40 MEQ: 1500 TABLET, EXTENDED RELEASE ORAL at 11:09

## 2020-09-13 RX ADMIN — PANTOPRAZOLE SODIUM 40 MG: 40 TABLET, DELAYED RELEASE ORAL at 08:09

## 2020-09-13 RX ADMIN — CARVEDILOL 12.5 MG: 12.5 TABLET, FILM COATED ORAL at 09:09

## 2020-09-13 RX ADMIN — INSULIN ASPART 4 UNITS: 100 INJECTION, SOLUTION INTRAVENOUS; SUBCUTANEOUS at 11:09

## 2020-09-13 RX ADMIN — PIPERACILLIN SODIUM AND TAZOBACTAM SODIUM 4.5 G: 4; .5 INJECTION, POWDER, LYOPHILIZED, FOR SOLUTION INTRAVENOUS at 09:09

## 2020-09-13 RX ADMIN — ASPIRIN 81 MG: 81 TABLET, COATED ORAL at 08:09

## 2020-09-13 RX ADMIN — ACETAMINOPHEN 650 MG: 325 TABLET, FILM COATED ORAL at 09:09

## 2020-09-13 RX ADMIN — SODIUM CHLORIDE, SODIUM LACTATE, POTASSIUM CHLORIDE, AND CALCIUM CHLORIDE: .6; .31; .03; .02 INJECTION, SOLUTION INTRAVENOUS at 04:09

## 2020-09-13 RX ADMIN — PIPERACILLIN SODIUM AND TAZOBACTAM SODIUM 4.5 G: 4; .5 INJECTION, POWDER, LYOPHILIZED, FOR SOLUTION INTRAVENOUS at 06:09

## 2020-09-13 RX ADMIN — MAGNESIUM SULFATE 4 G: 2 INJECTION INTRAVENOUS at 02:09

## 2020-09-13 RX ADMIN — APIXABAN 2.5 MG: 2.5 TABLET, FILM COATED ORAL at 09:09

## 2020-09-13 RX ADMIN — APIXABAN 2.5 MG: 2.5 TABLET, FILM COATED ORAL at 08:09

## 2020-09-13 RX ADMIN — CARVEDILOL 12.5 MG: 12.5 TABLET, FILM COATED ORAL at 08:09

## 2020-09-13 RX ADMIN — PRAVASTATIN SODIUM 20 MG: 20 TABLET ORAL at 12:09

## 2020-09-13 RX ADMIN — INSULIN ASPART 1 UNITS: 100 INJECTION, SOLUTION INTRAVENOUS; SUBCUTANEOUS at 09:09

## 2020-09-13 NOTE — PLAN OF CARE
(Physician in Lead of Transfers)   Outside Transfer Acceptance Note / [facility] Referral [facility]      Transferring Physician: Moy Branham MD    Accepting Physician: Rachelle Giraldo MD    Date of Acceptance: 09/12/2020    Transferring Facility: [facility]    Reason for Transfer: higher level of care    Report from Transferring Physician/Hospital course:  Patient is a 89 y.o. female who has a past medical history of Anxiety, Arthritis, Coronary artery disease, Diabetes mellitus type II, HHD (hypertensive heart disease), Hyperlipidemia, Hypertension, Mitral valve disease, Pacemaker, and Syncope and collapse presented with fever/chills and hypotension. She presented to [facility] with SBP of 89 and fever of 100.7. Work up revealed elevated lactic acid of 3.9, pro vivian of 12.07, D-dimer of 0.76. She was given IVF's with no improvement of BP then started on Levophed. MAP >65 on 0.02mcg/kg/min. Etiology of sepsis unclear. UA negative, CXR with no infiltrates, CT scan of abdomen with no acute findings (offical read pending). Started on empiric abx with Zosyn and Vanc. Facility seeking transfer for higher level of care.     Vital Signs (Most Recent):  Temp: 98.6 °F (37 °C) (09/12/20 1739)  Pulse: 71 (09/12/20 1906)  Resp: 17 (09/12/20 1856)  BP: (!) 107/59 (09/12/20 1906)  SpO2: 96 % (09/12/20 1906) Vital Signs (24h Range):  Temp:  [98.6 °F (37 °C)-99.7 °F (37.6 °C)] 98.6 °F (37 °C)  Pulse:  [68-89] 71  Resp:  [17-44] 17  SpO2:  [85 %-100 %] 96 %  BP: ()/(40-80) 107/59       Labs & Radiographs: see EPIC    Significant Labs:   Blood Culture: No results for input(s): LABBLOO in the last 48 hours., CMP:   Recent Labs   Lab 09/12/20  1457   *   K 3.7   CL 91*   CO2 25   *   BUN 38*   CREATININE 1.6*   CALCIUM 8.5*   PROT 6.0   ALBUMIN 2.9*   BILITOT 1.7*   ALKPHOS 242*   AST 30   ALT 50*   ANIONGAP 13   ESTGFRAFRICA 32*   EGFRNONAA 28*   , CBC:   Recent Labs   Lab 09/12/20  1457   WBC 6.45    HGB 12.8   HCT 36.3*   *   , INR:   Recent Labs   Lab 09/12/20  1457   INR 1.1   , Lipid Panel No results for input(s): CHOL, HDL, LDLCALC, TRIG, CHOLHDL in the last 48 hours. and Troponin   Recent Labs   Lab 09/12/20  1457   TROPONINI 0.015       Significant Imaging:   X-Ray Chest 1 View  Narrative: EXAMINATION:  XR CHEST 1 VIEW    CLINICAL HISTORY:  shortness of  breath;    TECHNIQUE:  A portable AP view of the chest was acquired.    COMPARISON:  Chest x-ray 06/24/2020    FINDINGS:  There is a dual lead left chest cardiac pacemaker device in place.  The cardiac silhouette is enlarged.  There is atherosclerotic calcification present within the thoracic aortic arch.  No airspace consolidation or pulmonary mass identified.  There is linear atelectasis versus fibrotic scarring present in the right lower lung zone.  No significant volume of pleural fluid or pneumothorax.  There is degenerative change of the AC joints.  There is a high-riding left humeral head suggesting a full-thickness left rotator cuff tendon tear.  There is advanced degenerative change versus CPPD of the left shoulder joint.  There are postoperative changes of right rotator cuff tendon repair..  Impression: Linear atelectasis versus fibrosis in the right lower lung zone.  Otherwise, no interval detrimental change when compared to the previous exam.    Electronically signed by: Jon Joel MD  Date:    09/12/2020  Time:    16:23      To Do List:   1. Admit to  ICU  2. Cont IV abx  3. Follow up CT abdomen  4. Follow up cultures  5. Cont Levophed and wean as tolerated.     Rachelle Giraldo MD  Hospital Medicine Staff

## 2020-09-13 NOTE — SUBJECTIVE & OBJECTIVE
Past Medical History:   Diagnosis Date    Anxiety     Arthritis     Coronary artery disease     Diabetes mellitus type II     HHD (hypertensive heart disease)     Hyperlipidemia     Hypertension     Mitral valve disease     Pacemaker     Syncope and collapse        Past Surgical History:   Procedure Laterality Date    BACK SURGERY      BREAST SURGERY      CARDIAC PACEMAKER PLACEMENT  01/23/2009    CHOLECYSTECTOMY      CORONARY ANGIOPLASTY WITH STENT PLACEMENT  03/2001    EYE SURGERY Bilateral 06/2001    cataracts    HEMORRHOID SURGERY      HYSTERECTOMY      REPLACEMENT OF PACEMAKER GENERATOR N/A 5/1/2019    Procedure: REPLACEMENT, PACEMAKER GENERATOR;  Surgeon: Ezio Mclean MD;  [facility];  Service: Cardiology;  Laterality: N/A;    ROTATOR CUFF REPAIR Right     TOTAL KNEE ARTHROPLASTY Right        Review of patient's allergies indicates:   Allergen Reactions    Iodinated contrast media Hives       Current Facility-Administered Medications on File Prior to Encounter   Medication    [COMPLETED] cefTRIAXone (ROCEPHIN) 2 g/50 mL D5W IVPB    [COMPLETED] norepinephrine 4 mg in dextrose 5 % 250 mL infusion    [COMPLETED] piperacillin-tazobactam 4.5 g in dextrose 5 % 100 mL IVPB (ready to mix system)    [COMPLETED] sodium chloride 0.9% bolus 1,000 mL    [COMPLETED] sodium chloride 0.9% bolus 1,000 mL    [COMPLETED] vancomycin (VANCOCIN) 1,000 mg injection    [COMPLETED] vancomycin 2 g in dextrose 5 % 500 mL IVPB    [DISCONTINUED] norepinephrine bitartrate-D5W 4 mg/250 mL (16 mcg/mL) infusion Soln     Current Outpatient Medications on File Prior to Encounter   Medication Sig    acetaminophen (TYLENOL) 325 MG tablet Take 325 mg by mouth every 6 (six) hours as needed for Pain.    alcohol swabs PadM Apply 1 each topically once daily.    apixaban (ELIQUIS) 2.5 mg Tab Take 1 tablet (2.5 mg total) by mouth 2 (two) times daily.    aspirin (ECOTRIN) 81 MG EC tablet Take 1 tablet (81  mg total) by mouth once daily.    carvediloL (COREG) 12.5 MG tablet Take 12.5 mg by mouth 2 (two) times daily.    cloNIDine (CATAPRES) 0.1 MG tablet Take 0.1 mg by mouth as needed (hypertension).    diabetic supplies, miscellan. Misc TRUEPLUS SUPER THIN 28G LANCET. USE AS DIRECTED TO TEST BLOOD SUGAR TWICE DAILY.    furosemide (LASIX) 20 MG tablet Take 1 tablet (20 mg total) by mouth every other day.    gabapentin (NEURONTIN) 300 MG capsule Take 1 capsule (300 mg total) by mouth 3 (three) times daily.    irbesartan (AVAPRO) 150 MG tablet Take 150 mg by mouth once daily.    LORazepam (ATIVAN) 0.5 MG tablet TAKE 1 TABLET EVERY 8 HOURS AS NEEDED    metFORMIN (GLUCOPHAGE) 500 MG tablet Take 1 tablet (500 mg total) by mouth every evening.    nitroGLYCERIN (NITROSTAT) 0.4 MG SL tablet Place 1 tablet (0.4 mg total) under the tongue every 5 (five) minutes as needed.    pantoprazole (PROTONIX) 40 MG tablet TAKE 1 TABLET (40 MG TOTAL) BY MOUTH ONCE DAILY.    potassium chloride SA (K-DUR,KLOR-CON) 20 MEQ tablet Take 20 mEq by mouth once daily.    pravastatin (PRAVACHOL) 20 MG tablet Take 1 tablet (20 mg total) by mouth nightly.    TRUE METRIX GLUCOSE TEST STRIP Strp USE TO TEST BLOOD SUGARS 2 TIMES DAILY.     Family History     None        Tobacco Use    Smoking status: Never Smoker    Smokeless tobacco: Never Used   Substance and Sexual Activity    Alcohol use: No    Drug use: No    Sexual activity: Not on file     Review of Systems   Constitutional: Positive for activity change, fatigue and fever. Negative for appetite change.   HENT: Negative for congestion, ear pain, rhinorrhea and sinus pressure.    Eyes: Negative for pain and discharge.   Respiratory: Negative for cough, chest tightness, shortness of breath and wheezing.    Cardiovascular: Negative for chest pain and leg swelling.   Gastrointestinal: Negative for abdominal distention, abdominal pain, diarrhea, nausea and vomiting.   Endocrine: Negative  for cold intolerance and heat intolerance.   Genitourinary: Negative for difficulty urinating, flank pain, frequency, hematuria and urgency.   Musculoskeletal: Negative for arthralgias, joint swelling and myalgias.   Allergic/Immunologic: Negative for environmental allergies and food allergies.   Neurological: Positive for weakness. Negative for dizziness, light-headedness and headaches.   Hematological: Does not bruise/bleed easily.   Psychiatric/Behavioral: Negative for agitation, behavioral problems and decreased concentration.     Objective:     Vital Signs (Most Recent):    Vital Signs (24h Range):  Temp:  [98.6 °F (37 °C)-99.7 °F (37.6 °C)] 98.6 °F (37 °C)  Pulse:  [62-89] 63  Resp:  [17-44] 20  SpO2:  [85 %-100 %] 100 %  BP: ()/(40-80) 121/65        There is no height or weight on file to calculate BMI.    Physical Exam  Constitutional:       Appearance: Normal appearance. She is well-developed.   HENT:      Head: Normocephalic.   Eyes:      General:         Right eye: No discharge.         Left eye: No discharge.      Conjunctiva/sclera: Conjunctivae normal.   Neck:      Musculoskeletal: Normal range of motion and neck supple.   Cardiovascular:      Rate and Rhythm: Normal rate and regular rhythm.      Pulses:           Radial pulses are 2+ on the right side and 2+ on the left side.      Heart sounds: Normal heart sounds.   Pulmonary:      Effort: Pulmonary effort is normal. No respiratory distress.      Breath sounds: Normal breath sounds.   Abdominal:      General: Bowel sounds are normal. There is no distension.      Palpations: Abdomen is soft.      Tenderness: There is no abdominal tenderness.   Musculoskeletal: Normal range of motion.   Skin:     General: Skin is warm and dry.   Neurological:      Mental Status: She is alert and oriented to person, place, and time.      GCS: GCS eye subscore is 4. GCS verbal subscore is 5. GCS motor subscore is 6.   Psychiatric:         Mood and Affect: Mood  normal.         Speech: Speech normal.         Behavior: Behavior normal.         Thought Content: Thought content normal.             Significant Labs: All pertinent labs within the past 24 hours have been reviewed.    Significant Imaging: I have reviewed all pertinent imaging results/findings within the past 24 hours.

## 2020-09-13 NOTE — CONSULTS
Wound Care consult for wounds to sacrum, right elbow and right 3d toe. Elderly lady that was awake conversing when spoken to. Pictures taken.Assisted by RNIris.    Sacrum - Moisture Associated Skin Damage - due to urinary incontinence. Patient with a PureWick in place, but unable to contain all the urine. Nursing reported patient had the redness on admit. Cleaned buttock and gluteal cleft with warm wipes, patted dry. Applied Triad Paste to area and covered with a silicone bordered foam dressing.    Right Elbow - Typo noted to picture associated with this area - There is a dry, red based scab that measured 0.2 cm X 0.2 cm. Nursing had applied a silicone bordered foam dressing to the elbow for protection.    Right Third Toe - patient reported she spilled hot water on her toe. Patient reported it occurred a week ago and she has kept a band aid over the area.   There is a dry red to tan scab that measured 0.7 cm X 0.5 cm. Eleonora-wound area is red/pink. Patient reported there is no pain. Patient reported she has home health and they have taken good care of her. Nursing to violeta to keep a band aid over for protection.    Assisted Iris with changing the linens and gown on patient. Patient is able to turn self in bed with a little assistance. Patient is on a low air loss mattress.    Recommend: Sacrum - Moisture Associated Skin Damage - After perineal care, apply Triad paste (hydrophilic) to the sacrum and cover with a silicone bordered foam dressing.    Recommend: Right elbow and right 3d toe - Keep dry scabs covered with a silicone bordered foam dressing for protection.    OLIVIA Prakash    Sacrum      Right elbow - scab      Right 3d toe scab

## 2020-09-13 NOTE — NURSING
Pt remains stable, in nad, denies pain at this time. IVF infusing to R femoral central line, mag replacement and abx as well. Vitals stable, pacemaker capture on demand. purewick in place. K and mag values low- mag replaced and Ángel Pearce made aware of low k, no new orders at this time.  Will continue to monitor pt.

## 2020-09-13 NOTE — HPI
"Per Vikas Goode, [place]:    "The patient is a 89 y.o. female who has a past medical history of coronary artery disease, diabetes mellitus type II, HHD (hypertensive heart disease), hyperlipidemia, hypertension, and syncope presented to [facility] with fever/chills and hypotension.  Patient states she started feeling weak and lethargic this morning, so her grand daughter called EMS.  On presentation to outside facility, patient was febrile and hypotensive requiring pressor support.  Her lactic was elevated above 3, so broad spectrum antibiotics initiated.  She was transferred to [facility] for continued intensive care."  "

## 2020-09-13 NOTE — NURSING
Pt admitted to unit in stretcher, accompanied by EMS. Pt alert and oriented, in NAD, denies pain at this time. Pt vitals taken and stable. Pt had levophed infusing at 0.02mcg/kg/min, which was dc'd upon admission due to stable BP-see vitals flowsheet. Admission questionnaire completed and head to to assessment completed. SOS and wound care consult in place for prior to admission wounds- non-blanchable redness to perirectal area, wound to R elbow(foam applied), and wound to R toe(foam applied). LR infusing, scds applied, purewick applied. R femoral triple lumen intact. Will continue to monitor.

## 2020-09-13 NOTE — PLAN OF CARE
Problem: Adult Inpatient Plan of Care  Goal: Plan of Care Review  Outcome: Ongoing, Progressing  Goal: Patient-Specific Goal (Individualization)  Outcome: Ongoing, Progressing  Goal: Absence of Hospital-Acquired Illness or Injury  Outcome: Ongoing, Progressing  Goal: Optimal Comfort and Wellbeing  Outcome: Ongoing, Progressing  Goal: Readiness for Transition of Care  Outcome: Ongoing, Progressing  Goal: Rounds/Family Conference  Outcome: Ongoing, Progressing     Problem: Diabetes Comorbidity  Goal: Blood Glucose Level Within Desired Range  Outcome: Ongoing, Progressing     Problem: Electrolyte Imbalance (Acute Kidney Injury/Impairment)  Goal: Serum Electrolyte Balance  Outcome: Ongoing, Progressing     Problem: Fluid Imbalance (Acute Kidney Injury/Impairment)  Goal: Optimal Fluid Balance  Outcome: Ongoing, Progressing     Problem: Hematologic Alteration (Acute Kidney Injury/Impairment)  Goal: Hemoglobin, Hematocrit and Platelets Within Normal Range  Outcome: Ongoing, Progressing     Problem: Oral Intake Inadequate (Acute Kidney Injury/Impairment)  Goal: Optimal Nutrition Intake  Outcome: Ongoing, Progressing     Problem: Renal Function Impairment (Acute Kidney Injury/Impairment)  Goal: Effective Renal Function  Outcome: Ongoing, Progressing     Problem: Infection  Goal: Infection Symptom Resolution  Outcome: Ongoing, Progressing     Problem: Adjustment to Illness (Sepsis/Septic Shock)  Goal: Optimal Coping  Outcome: Ongoing, Progressing     Problem: Bleeding (Sepsis/Septic Shock)  Goal: Absence of Bleeding  Outcome: Ongoing, Progressing     Problem: Glycemic Control Impaired (Sepsis/Septic Shock)  Goal: Blood Glucose Level Within Desired Range  Outcome: Ongoing, Progressing     Problem: Hemodynamic Instability (Sepsis/Septic Shock)  Goal: Effective Tissue Perfusion  Outcome: Ongoing, Progressing     Problem: Infection (Sepsis/Septic Shock)  Goal: Absence of Infection Signs/Symptoms  Outcome: Ongoing, Progressing      Problem: Nutrition Impaired (Sepsis/Septic Shock)  Goal: Optimal Nutrition Intake  Outcome: Ongoing, Progressing     Problem: Respiratory Compromise (Sepsis/Septic Shock)  Goal: Effective Oxygenation and Ventilation  Outcome: Ongoing, Progressing     Problem: Fall Injury Risk  Goal: Absence of Fall and Fall-Related Injury  Outcome: Ongoing, Progressing     Problem: Wound  Goal: Optimal Wound Healing  Outcome: Ongoing, Progressing

## 2020-09-13 NOTE — H&P
[facility] Hospital Medicine  History & Physical    Patient Name: Erin Sahu  MRN: [id number]  Admission Date: 9/13/2020  Attending Physician: Law Tee MD   Primary Care Provider: Junior Godinez MD         Patient information was obtained from patient, past medical records and ER records.     Subjective:     Principal Problem:Sepsis    Chief Complaint: No chief complaint on file.       HPI: The patient is a 89 y.o. female who has a past medical history of coronary artery disease, diabetes mellitus type II, HHD (hypertensive heart disease), hyperlipidemia, hypertension, and syncope presented to [facility] with fever/chills and hypotension.  Patient states she started feeling weak and lethargic this morning, so her grand daughter called EMS.  On presentation to outside facility, patient was febrile and hypotensive requiring pressor support.  Her lactic was elevated above 3, so broad spectrum antibiotics initiated.  She was transferred to [facility] for continued intensive care.     Past Medical History:   Diagnosis Date    Anxiety     Arthritis     Coronary artery disease     Diabetes mellitus type II     HHD (hypertensive heart disease)     Hyperlipidemia     Hypertension     Mitral valve disease     Pacemaker     Syncope and collapse        Past Surgical History:   Procedure Laterality Date    BACK SURGERY      BREAST SURGERY      CARDIAC PACEMAKER PLACEMENT  01/23/2009    CHOLECYSTECTOMY      CORONARY ANGIOPLASTY WITH STENT PLACEMENT  03/2001    EYE SURGERY Bilateral 06/2001    cataracts    HEMORRHOID SURGERY      HYSTERECTOMY      REPLACEMENT OF PACEMAKER GENERATOR N/A 5/1/2019    Procedure: REPLACEMENT, PACEMAKER GENERATOR;  Surgeon: Ezio Mclean MD;  Location: [facility] CATH;  Service: Cardiology;  Laterality: N/A;    ROTATOR CUFF REPAIR Right     TOTAL KNEE ARTHROPLASTY Right        Review of patient's allergies indicates:   Allergen Reactions    Iodinated  contrast media Hives       Current Facility-Administered Medications on File Prior to Encounter   Medication    [COMPLETED] cefTRIAXone (ROCEPHIN) 2 g/50 mL D5W IVPB    [COMPLETED] norepinephrine 4 mg in dextrose 5 % 250 mL infusion    [COMPLETED] piperacillin-tazobactam 4.5 g in dextrose 5 % 100 mL IVPB (ready to mix system)    [COMPLETED] sodium chloride 0.9% bolus 1,000 mL    [COMPLETED] sodium chloride 0.9% bolus 1,000 mL    [COMPLETED] vancomycin (VANCOCIN) 1,000 mg injection    [COMPLETED] vancomycin 2 g in dextrose 5 % 500 mL IVPB    [DISCONTINUED] norepinephrine bitartrate-D5W 4 mg/250 mL (16 mcg/mL) infusion Soln     Current Outpatient Medications on File Prior to Encounter   Medication Sig    acetaminophen (TYLENOL) 325 MG tablet Take 325 mg by mouth every 6 (six) hours as needed for Pain.    alcohol swabs PadM Apply 1 each topically once daily.    apixaban (ELIQUIS) 2.5 mg Tab Take 1 tablet (2.5 mg total) by mouth 2 (two) times daily.    aspirin (ECOTRIN) 81 MG EC tablet Take 1 tablet (81 mg total) by mouth once daily.    carvediloL (COREG) 12.5 MG tablet Take 12.5 mg by mouth 2 (two) times daily.    cloNIDine (CATAPRES) 0.1 MG tablet Take 0.1 mg by mouth as needed (hypertension).    diabetic supplies, miscellan. Misc TRUEPLUS SUPER THIN 28G LANCET. USE AS DIRECTED TO TEST BLOOD SUGAR TWICE DAILY.    furosemide (LASIX) 20 MG tablet Take 1 tablet (20 mg total) by mouth every other day.    gabapentin (NEURONTIN) 300 MG capsule Take 1 capsule (300 mg total) by mouth 3 (three) times daily.    irbesartan (AVAPRO) 150 MG tablet Take 150 mg by mouth once daily.    LORazepam (ATIVAN) 0.5 MG tablet TAKE 1 TABLET EVERY 8 HOURS AS NEEDED    metFORMIN (GLUCOPHAGE) 500 MG tablet Take 1 tablet (500 mg total) by mouth every evening.    nitroGLYCERIN (NITROSTAT) 0.4 MG SL tablet Place 1 tablet (0.4 mg total) under the tongue every 5 (five) minutes as needed.    pantoprazole (PROTONIX) 40 MG tablet  TAKE 1 TABLET (40 MG TOTAL) BY MOUTH ONCE DAILY.    potassium chloride SA (K-DUR,KLOR-CON) 20 MEQ tablet Take 20 mEq by mouth once daily.    pravastatin (PRAVACHOL) 20 MG tablet Take 1 tablet (20 mg total) by mouth nightly.    TRUE METRIX GLUCOSE TEST STRIP Strp USE TO TEST BLOOD SUGARS 2 TIMES DAILY.     Family History     None        Tobacco Use    Smoking status: Never Smoker    Smokeless tobacco: Never Used   Substance and Sexual Activity    Alcohol use: No    Drug use: No    Sexual activity: Not on file     Review of Systems   Constitutional: Positive for activity change, fatigue and fever. Negative for appetite change.   HENT: Negative for congestion, ear pain, rhinorrhea and sinus pressure.    Eyes: Negative for pain and discharge.   Respiratory: Negative for cough, chest tightness, shortness of breath and wheezing.    Cardiovascular: Negative for chest pain and leg swelling.   Gastrointestinal: Negative for abdominal distention, abdominal pain, diarrhea, nausea and vomiting.   Endocrine: Negative for cold intolerance and heat intolerance.   Genitourinary: Negative for difficulty urinating, flank pain, frequency, hematuria and urgency.   Musculoskeletal: Negative for arthralgias, joint swelling and myalgias.   Allergic/Immunologic: Negative for environmental allergies and food allergies.   Neurological: Positive for weakness. Negative for dizziness, light-headedness and headaches.   Hematological: Does not bruise/bleed easily.   Psychiatric/Behavioral: Negative for agitation, behavioral problems and decreased concentration.     Objective:     Vital Signs (Most Recent):    Vital Signs (24h Range):  Temp:  [98.6 °F (37 °C)-99.7 °F (37.6 °C)] 98.6 °F (37 °C)  Pulse:  [62-89] 63  Resp:  [17-44] 20  SpO2:  [85 %-100 %] 100 %  BP: ()/(40-80) 121/65        There is no height or weight on file to calculate BMI.    Physical Exam  Constitutional:       Appearance: Normal appearance. She is well-developed.    HENT:      Head: Normocephalic.   Eyes:      General:         Right eye: No discharge.         Left eye: No discharge.      Conjunctiva/sclera: Conjunctivae normal.   Neck:      Musculoskeletal: Normal range of motion and neck supple.   Cardiovascular:      Rate and Rhythm: Normal rate and regular rhythm.      Pulses:           Radial pulses are 2+ on the right side and 2+ on the left side.      Heart sounds: Normal heart sounds.   Pulmonary:      Effort: Pulmonary effort is normal. No respiratory distress.      Breath sounds: Normal breath sounds.   Abdominal:      General: Bowel sounds are normal. There is no distension.      Palpations: Abdomen is soft.      Tenderness: There is no abdominal tenderness.   Musculoskeletal: Normal range of motion.   Skin:     General: Skin is warm and dry.   Neurological:      Mental Status: She is alert and oriented to person, place, and time.      GCS: GCS eye subscore is 4. GCS verbal subscore is 5. GCS motor subscore is 6.   Psychiatric:         Mood and Affect: Mood normal.         Speech: Speech normal.         Behavior: Behavior normal.         Thought Content: Thought content normal.             Significant Labs: All pertinent labs within the past 24 hours have been reviewed.    Significant Imaging: I have reviewed all pertinent imaging results/findings within the past 24 hours.    Assessment/Plan:     * Sepsis  Febrile, tachypnea, hypotensive on presentation to [facility]. WBC not elevated.  Hypertensive on arrival to [facility]. Levophed off. No obvious sources of infection    Lactic pending  Continue Zosyn  IVF      PAF (paroxysmal atrial fibrillation)  Sinus on monitor.    Continue Coreg/Apixaban      Essential hypertension  Hypotensive at outside facility; hypertensive on arrival.    Hold antihypertensives for now      Diabetes mellitus with neuropathy  BG-209    A1c pending  Moderate dose SSI  BG AC/HS      Hyperlipidemia  Continue pravastatin        VTE Risk Mitigation  (From admission, onward)         Ordered     apixaban tablet 2.5 mg  2 times daily      09/13/20 0008     Reason for No Pharmacological VTE Prophylaxis  Once     Question:  Reasons:  Answer:  Already adequately anticoagulated on oral Anticoagulants    09/13/20 0008     Place HOLDEN hose  Until discontinued      09/13/20 0008     IP VTE HIGH RISK PATIENT  Once      09/13/20 0008     Place sequential compression device  Until discontinued      09/13/20 0008                   Vikas Goode NP  Department of Hospital Medicine   [facility]

## 2020-09-13 NOTE — ED PROVIDER NOTES
[facility]                                                 Chief Complaint  89 y.o. female with Shortness of Breath (for a few days) and Fever      History of Present Illness  Erin Sahu presents to the emergency room with fever at home  Patient weakness fatigue, felt short of breath, chills this evening PTA  Patient has a history significant for gram-negative sepsis in June 2020  Granddaughter states the patient was weak and lethargic yesterday  Was seen at [facility] ER, given IV fluids and discharge after  Daughter states that the patient began to get low BP and fever after    The history is provided by the patient   device was not used during this ER visit    Past Medical History   -- Anxiety     -- Arthritis     -- Coronary artery disease     -- Diabetes mellitus type II     -- Hyperlipidemia     -- Hypertension        Past Surgical History   -- BREAST SURGERY       -- CARDIAC PACEMAKER PLACEMENT       -- CHOLECYSTECTOMY       -- CORONARY ANGIOPLASTY WITH STENT PLACEMENT       -- EYE SURGERY       -- HYSTERECTOMY       -- TOTAL KNEE ARTHROPLASTY          ALLERGIES: IV dye    I have reviewed all of this patient's past medical, surgical, family, and social   histories as well as active allergies and medications documented in the  electronic medical record    Review of Systems and Physical Exam      Review of Systems  -- Constitution - weakness and fatigue, fever and chills  -- Eyes - no tearing or redness, no visual disturbance  -- Ear, Nose - no tinnitus or earache, no nasal congestion or discharge  -- Mouth,Throat - no sore throat, no toothache, normal voice, normal swallowing  -- Respiratory - shortness of breath, no CAMPOS, no cough or congestion  -- Cardiovascular - denies chest pain, no palpitations, denies claudication  -- Gastrointestinal - denies abdominal pain, nausea, vomiting, or diarrhea  -- Genitourinary - no dysuria, denies flank pain, no hematuria, no  STD risk  -- Musculoskeletal - denies back pain, negative for trauma or injury  -- Neurological - no headache, denies weakness or seizure; no LOC  -- Skin - denies pallor, rash, or changes in skin. no hives or welts noted    Vital Signs  Her tympanic temperature is 98.6 °F (37 °C).   Her blood pressure is 112/56 and her pulse is 69.   Her respiration is 18 and oxygen saturation is 98%.     Physical Exam  -- Nursing note and vitals reviewed  -- Constitutional: Appears well-developed and well-nourished  -- Head: Atraumatic. Normocephalic. No obvious abnormality  -- Eyes: Pupils are equal and reactive to light. Normal conjunctiva and lids  -- Cardiac: Normal rate, regular rhythm and normal heart sounds  -- Pulmonary: Normal respiratory effort, breath sounds clear to auscultation  -- Abdominal: Soft, no tenderness. Normal bowel sounds. Normal liver edge  -- Musculoskeletal: Normal range of motion, no effusions. Joints stable   -- Neurological: No focal deficits. Showed good interaction with staff  -- Vascular: Posterior tibial, dorsalis pedis and radial pulses 2+ bilaterally      Emergency Room Course      Lab Results   (L)   K 3.7   CL 91 (L)   CO2 25   BUN 38 (H)   CREATININE 1.6 (H)    (H)   ALKPHOS 242 (H)   AST 30   ALT 50 (H)   BILITOT 1.7 (H)   ALBUMIN 2.9 (L)   PROT 6.0   WBC 6.45   HGB 12.8   HCT 36.3 (L)    (L)         CPKMB 2.0   TROPONINI 0.015   INR 1.1    (H)   DDIMER 0.76 (H)   LACTATE 3.9 (HH)   MG 1.2 (L)   TSH 2.642     Urinalysis  -- Urinalysis performed during this ER visit showed no signs of infection      EKG   -- The EKG findings today were without concerning findings from baseline  -- chest x-ray showed linear atelectasis and fibrosis, no infiltrate noted  -- CT of the chest and pelvis showed mild right abdominal wall edema  -- see virtual Radiology report for full details this evening    Additional Work up  -- rapid Coronavirus PCR was negative  -- Blood  cultures have also been drawn, results are pending  -- CRP, ESR, LDH and ferritin pending  -- procalcitonin is 12    Medications Given  norepinephrine bitartrate-D5W 4 mg/250 mL (16 mcg/mL) infusion Soln   cefTRIAXone (ROCEPHIN) 2 g/50 mL D5W IVPB (2 g Intravenous New Bag 9/12/20 1814)   sodium chloride 0.9% bolus 1,000 mL (0 mLs Intravenous Stopped 9/12/20 1736)   vancomycin 2 g in dextrose 5 % 500 mL IVPB (0 mg Intravenous Stopped 9/12/20 1734)   piperacillin-tazobactam 4.5 g in dextrose 5 % 100 mL IVPB (ready to mix system)    sodium chloride 0.9% bolus 1,000 mL (0 mLs Intravenous Stopped 9/12/20 1833)   vancomycin (VANCOCIN) 1,000 mg injection (0 mg  Override Pull 9/12/20 1711)   norepinephrine 4 mg in dextrose 5 % 250 mL infusion     Critical Care ED Physician Time (minutes):  -- Performed by: Moy Branham M.D.  -- Date/Time: 7:08 PM 9/12/2020   -- Direct Patient Care (Face Time): 5  -- Additional History from Records or Taking Additional History: 0  -- Ordering, Reviewing, and Interpreting Diagnostic Studies: 5  -- Total Time in Documentation: 5  -- Consultation with Other Physicians: 5  -- Consultation with Family Related to Condition: 0  -- Total Critical Care Time: 20     ED Physician Management  -- Diagnosis management comments: 89 y.o. female with weakness and fatigue  -- patient with fever chills and hypotension at home, history of Gram-negative sepsis  -- patient never got in ER CPR endoscopic ultrasound during her June sepsis issues  -- patient presents today identically, no fever with a procalcitonin of 12, elevated lactic  -- patient given IV antibiotics tailored after previous cultures from June 2020 admit  -- now on Levophed to help maintain pressure, will reach out to [facility]  -- patient needs higher level care and possible hepatology evaluation for issue  -- 7:09 PM: Levophed IV fluids and IV antibiotics going, patient is stable at this time    Diagnosis  [R00.0] Tachycardia  [A41.9]  Sepsis    Disposition and Plan  -- Disposition: transfer  -- Condition: stable    This note is dictated on M*Modal word recognition program.  There are word recognition mistakes that are occasionally missed on review.         Moy Branham MD  09/12/20 1910

## 2020-09-13 NOTE — PROGRESS NOTES
eICU Brief Admission Note       Briefly,  89 y.o. female who has a past medical history of Anxiety, Arthritis, Coronary artery disease, Diabetes mellitus type II, HHD (hypertensive heart disease), Hyperlipidemia, Hypertension, Mitral valve disease, Pacemaker, and Syncope and collapse presented with fever/chills and hypotension. She presented to [facility] with SBP of 89 and fever of 100.7.     Video assessment :  Lying in bed     Vitals reviewed   febrile, on pressors     LABs reviewed       Radiology reviewed         Assessment / Plan :  Septic shock -- possible pneumonia ; SARS-CoV2 negative   Thrombocytopenia   Hyponatremia   DINO/CKD   Low Mg   Multiple co-morbidities :  Anxiety, Arthritis, Coronary artery disease, Diabetes mellitus type II, HHD (hypertensive heart disease), Hyperlipidemia, Hypertension, Mitral valve disease, Pacemaker, and Syncope   - ABX : Vanco + Zosyn; f/u cultures   - on Apixaban, ASA, BB   - f/u CT report         DVT Px : Apixaban   GI Px : PPI       Patient seen over video : Yes  Chart reviewed : Yes  Spoke with RN : No

## 2020-09-13 NOTE — ASSESSMENT & PLAN NOTE
Febrile, tachypnea, hypotensive on presentation to [facility]. WBC not elevated.  Hypertensive on arrival to [facility]. Levophed off. No obvious sources of infection    Lactic pending  Continue Zosyn  IVF

## 2020-09-13 NOTE — PLAN OF CARE
SW met with patient at bedside to complete discharge planning assessment.  Patient alert and oriented xs 4.  Patient verified all demographic information on facesheet is correct.  Patient verified PCP is Dr. Junior Godinez.  Patient verified primary health insurance is [employer].  Patient with home health and listed DME.  Patient signed patient choice disclosure choosing to resume home health with [facility].  Patient states she receives 2 hours every Friday of services through the [facility].  Patient with POA (Beni Bouziga, grand-child) and Living Will.  Patient not on dialysis or medication coumadin.  Patient states she is on Eliquis.  Patient with no 30 day admission.  Patient with no financial issues at this time.  Patient family will provide transportation upon discharge from facility.  Patient independent with ADLs, live alone, drives self.  Patient uses [facility] [address] [place] [id number] [facility] [address].       09/13/20 9743   Discharge Assessment   Assessment Type Discharge Planning Assessment   Confirmed/corrected address and phone number on facesheet? Yes   Assessment information obtained from? Patient   Prior to hospitilization cognitive status: Alert/Oriented   Prior to hospitalization functional status: Independent   Current cognitive status: Alert/Oriented   Current Functional Status: Independent   Lives With alone   Able to Return to Prior Arrangements yes   Is patient able to care for self after discharge? Yes   Patient's perception of discharge disposition home health   Readmission Within the Last 30 Days no previous admission in last 30 days   Patient currently being followed by outpatient case management? No   Patient currently receives any other outside agency services? Yes   How many hours a day does the patient receive services? 2   Name and contact number of agency or person providing outside services [facility] (set up)   Is it the patient/care giver preference to resume  care with the current outside agency? Yes   Equipment Currently Used at Home walker, rolling;shower chair;cane, straight   Do you have any problems affording any of your prescribed medications? No   Does the patient have transportation home? Yes   Transportation Anticipated family or friend will provide   Does the patient receive services at the Coumadin Clinic? No  (Eliquis)   Discharge Plan A Home Health   Discharge Plan B Home Health   DME Needed Upon Discharge  none   Patient/Family in Agreement with Plan yes

## 2020-09-14 ENCOUNTER — TELEPHONE (OUTPATIENT)
Dept: INTERNAL MEDICINE | Facility: CLINIC | Age: 85
End: 2020-09-14

## 2020-09-14 LAB
ALBUMIN SERPL BCP-MCNC: 2.5 G/DL (ref 3.5–5.2)
ALP SERPL-CCNC: 136 U/L (ref 55–135)
ALT SERPL W/O P-5'-P-CCNC: 27 U/L (ref 10–44)
ANION GAP SERPL CALC-SCNC: 8 MMOL/L (ref 8–16)
AST SERPL-CCNC: 13 U/L (ref 10–40)
BASOPHILS # BLD AUTO: 0.02 K/UL (ref 0–0.2)
BASOPHILS NFR BLD: 0.3 % (ref 0–1.9)
BILIRUB SERPL-MCNC: 0.9 MG/DL (ref 0.1–1)
BUN SERPL-MCNC: 16 MG/DL (ref 10–30)
CALCIUM SERPL-MCNC: 8.3 MG/DL (ref 8.7–10.5)
CHLORIDE SERPL-SCNC: 99 MMOL/L (ref 95–110)
CO2 SERPL-SCNC: 24 MMOL/L (ref 23–29)
CREAT SERPL-MCNC: 0.8 MG/DL (ref 0.5–1.4)
DIFFERENTIAL METHOD: ABNORMAL
EOSINOPHIL # BLD AUTO: 0.3 K/UL (ref 0–0.5)
EOSINOPHIL NFR BLD: 4.9 % (ref 0–8)
ERYTHROCYTE [DISTWIDTH] IN BLOOD BY AUTOMATED COUNT: 12.6 % (ref 11.5–14.5)
EST. GFR  (AFRICAN AMERICAN): >60 ML/MIN/1.73 M^2
EST. GFR  (NON AFRICAN AMERICAN): >60 ML/MIN/1.73 M^2
GLUCOSE SERPL-MCNC: 162 MG/DL (ref 70–110)
HCT VFR BLD AUTO: 36.7 % (ref 37–48.5)
HGB BLD-MCNC: 12.7 G/DL (ref 12–16)
IMM GRANULOCYTES # BLD AUTO: 0.06 K/UL (ref 0–0.04)
IMM GRANULOCYTES NFR BLD AUTO: 1 % (ref 0–0.5)
LYMPHOCYTES # BLD AUTO: 1 K/UL (ref 1–4.8)
LYMPHOCYTES NFR BLD: 17.2 % (ref 18–48)
MAGNESIUM SERPL-MCNC: 1.7 MG/DL (ref 1.6–2.6)
MCH RBC QN AUTO: 32.2 PG (ref 27–31)
MCHC RBC AUTO-ENTMCNC: 34.6 G/DL (ref 32–36)
MCV RBC AUTO: 93 FL (ref 82–98)
MONOCYTES # BLD AUTO: 0.5 K/UL (ref 0.3–1)
MONOCYTES NFR BLD: 7.6 % (ref 4–15)
NEUTROPHILS # BLD AUTO: 4.1 K/UL (ref 1.8–7.7)
NEUTROPHILS NFR BLD: 69 % (ref 38–73)
NRBC BLD-RTO: 0 /100 WBC
PHOSPHATE SERPL-MCNC: 2 MG/DL (ref 2.7–4.5)
PLATELET # BLD AUTO: 89 K/UL (ref 150–350)
PMV BLD AUTO: 9 FL (ref 9.2–12.9)
POCT GLUCOSE: 164 MG/DL (ref 70–110)
POCT GLUCOSE: 191 MG/DL (ref 70–110)
POCT GLUCOSE: 197 MG/DL (ref 70–110)
POTASSIUM SERPL-SCNC: 4 MMOL/L (ref 3.5–5.1)
PROT SERPL-MCNC: 5 G/DL (ref 6–8.4)
RBC # BLD AUTO: 3.95 M/UL (ref 4–5.4)
SODIUM SERPL-SCNC: 131 MMOL/L (ref 136–145)
WBC # BLD AUTO: 5.94 K/UL (ref 3.9–12.7)

## 2020-09-14 PROCEDURE — 25000003 PHARM REV CODE 250: Performed by: NURSE PRACTITIONER

## 2020-09-14 PROCEDURE — 99233 SBSQ HOSP IP/OBS HIGH 50: CPT | Mod: ,,, | Performed by: HOSPITALIST

## 2020-09-14 PROCEDURE — 83735 ASSAY OF MAGNESIUM: CPT

## 2020-09-14 PROCEDURE — 99233 PR SUBSEQUENT HOSPITAL CARE,LEVL III: ICD-10-PCS | Mod: ,,, | Performed by: HOSPITALIST

## 2020-09-14 PROCEDURE — 63600175 PHARM REV CODE 636 W HCPCS: Performed by: HOSPITALIST

## 2020-09-14 PROCEDURE — 84100 ASSAY OF PHOSPHORUS: CPT

## 2020-09-14 PROCEDURE — 80053 COMPREHEN METABOLIC PANEL: CPT

## 2020-09-14 PROCEDURE — 20000000 HC ICU ROOM

## 2020-09-14 PROCEDURE — 85025 COMPLETE CBC W/AUTO DIFF WBC: CPT

## 2020-09-14 PROCEDURE — 25000003 PHARM REV CODE 250: Performed by: HOSPITALIST

## 2020-09-14 PROCEDURE — 63600175 PHARM REV CODE 636 W HCPCS: Performed by: NURSE PRACTITIONER

## 2020-09-14 RX ORDER — LORAZEPAM 0.5 MG/1
0.5 TABLET ORAL EVERY 12 HOURS PRN
Status: DISCONTINUED | OUTPATIENT
Start: 2020-09-14 | End: 2020-09-22 | Stop reason: HOSPADM

## 2020-09-14 RX ORDER — NIFEDIPINE 30 MG/1
30 TABLET, EXTENDED RELEASE ORAL DAILY
Status: DISCONTINUED | OUTPATIENT
Start: 2020-09-14 | End: 2020-09-22 | Stop reason: HOSPADM

## 2020-09-14 RX ORDER — HALOPERIDOL 5 MG/ML
5 INJECTION INTRAMUSCULAR ONCE
Status: DISCONTINUED | OUTPATIENT
Start: 2020-09-14 | End: 2020-09-14

## 2020-09-14 RX ORDER — HALOPERIDOL 5 MG/ML
1 INJECTION INTRAMUSCULAR EVERY 4 HOURS PRN
Status: DISCONTINUED | OUTPATIENT
Start: 2020-09-14 | End: 2020-09-22 | Stop reason: HOSPADM

## 2020-09-14 RX ORDER — HALOPERIDOL 5 MG/ML
2 INJECTION INTRAMUSCULAR ONCE
Status: COMPLETED | OUTPATIENT
Start: 2020-09-14 | End: 2020-09-14

## 2020-09-14 RX ADMIN — HALOPERIDOL LACTATE 2 MG: 5 INJECTION, SOLUTION INTRAMUSCULAR at 06:09

## 2020-09-14 RX ADMIN — HALOPERIDOL LACTATE 1 MG: 5 INJECTION, SOLUTION INTRAMUSCULAR at 10:09

## 2020-09-14 RX ADMIN — APIXABAN 5 MG: 2.5 TABLET, FILM COATED ORAL at 08:09

## 2020-09-14 RX ADMIN — PIPERACILLIN SODIUM AND TAZOBACTAM SODIUM 4.5 G: 4; .5 INJECTION, POWDER, LYOPHILIZED, FOR SOLUTION INTRAVENOUS at 05:09

## 2020-09-14 RX ADMIN — SODIUM CHLORIDE, SODIUM LACTATE, POTASSIUM CHLORIDE, AND CALCIUM CHLORIDE: .6; .31; .03; .02 INJECTION, SOLUTION INTRAVENOUS at 04:09

## 2020-09-14 RX ADMIN — PRAVASTATIN SODIUM 20 MG: 20 TABLET ORAL at 10:09

## 2020-09-14 RX ADMIN — INSULIN ASPART 2 UNITS: 100 INJECTION, SOLUTION INTRAVENOUS; SUBCUTANEOUS at 04:09

## 2020-09-14 RX ADMIN — INSULIN ASPART 2 UNITS: 100 INJECTION, SOLUTION INTRAVENOUS; SUBCUTANEOUS at 07:09

## 2020-09-14 RX ADMIN — ACETAMINOPHEN 650 MG: 325 TABLET, FILM COATED ORAL at 07:09

## 2020-09-14 RX ADMIN — PIPERACILLIN SODIUM AND TAZOBACTAM SODIUM 4.5 G: 4; .5 INJECTION, POWDER, LYOPHILIZED, FOR SOLUTION INTRAVENOUS at 03:09

## 2020-09-14 RX ADMIN — CARVEDILOL 12.5 MG: 12.5 TABLET, FILM COATED ORAL at 08:09

## 2020-09-14 RX ADMIN — NIFEDIPINE 30 MG: 30 TABLET, FILM COATED, EXTENDED RELEASE ORAL at 08:09

## 2020-09-14 RX ADMIN — PIPERACILLIN SODIUM AND TAZOBACTAM SODIUM 4.5 G: 4; .5 INJECTION, POWDER, LYOPHILIZED, FOR SOLUTION INTRAVENOUS at 09:09

## 2020-09-14 RX ADMIN — LORAZEPAM 0.5 MG: 0.5 TABLET ORAL at 02:09

## 2020-09-14 RX ADMIN — HALOPERIDOL LACTATE 1 MG: 5 INJECTION, SOLUTION INTRAMUSCULAR at 05:09

## 2020-09-14 RX ADMIN — PANTOPRAZOLE SODIUM 40 MG: 40 TABLET, DELAYED RELEASE ORAL at 08:09

## 2020-09-14 RX ADMIN — INSULIN ASPART 1 UNITS: 100 INJECTION, SOLUTION INTRAVENOUS; SUBCUTANEOUS at 10:09

## 2020-09-14 RX ADMIN — APIXABAN 5 MG: 2.5 TABLET, FILM COATED ORAL at 10:09

## 2020-09-14 RX ADMIN — ASPIRIN 81 MG: 81 TABLET, COATED ORAL at 08:09

## 2020-09-14 RX ADMIN — CARVEDILOL 12.5 MG: 12.5 TABLET, FILM COATED ORAL at 10:09

## 2020-09-14 NOTE — ASSESSMENT & PLAN NOTE
"Patient presented with fever, tachypnea, with normal white blood cell count however with a left shift, hypotension, and mild lactic acidosis.  Patient started empiric broad-spectrum antibiotics.  No clear source of infection identified.     Patient admitted to [facility] from 6/24 - 6/29 for Sepsis - "She was started on IV Vanc/Zosyn along with IV fluids. CT abdomen/pelvis and US abdomen was unremarkable. Hepatology was consulted given elevated LFTs and believe presentation is correlated with possibility of passed sludge/stone with possible cholangitis. Blood cx + GNRs. Switched Zosyn to Rocephin as E coli and Proteus sensitive to rocephin. Vitals, labs, and symptoms continued to improve.  Hepatology recommending EUS for possible ERCP, which patient declined given age, improvement in symptoms, and labs. Patient received a midline and will be discharged home with IV Cefriaxone for a total 14 day treatment for GNR bacteremia (estimated end date: 7/8/2020)".      CXR on Admission - New left retrocardiac atelectasis or consolidation.  US of Abd on 9/14/2020 - Extrahepatic common duct measures 9 mm, mildly increased in size compared to June 2020. Mild intrahepatic bile duct dilatation.  Please correlate with laboratory values.  US of Kidneys on 9/14/2020 - No evidence of hydronephrosis     Continued pneumobilia left hepatic lobe, similar compared to previous.    Labs on Admission:  COVID-19 Rapid negative  Lactic Acid - 3.9, 1.1, 1.2  ESR 34/.6  ProCal 12.07 and 10.67  Lipase normal    Cultures:  Blood x 2 on 9/12 - 1/4 positive for Klebsiella pneumonia - sensitivities pending    Plan:  Continue Zosyn  Consult GI  Repeat Blood cultures  Check Urine Culture  "

## 2020-09-14 NOTE — ASSESSMENT & PLAN NOTE
Patient presented with fever, tachypnea, with normal white blood cell count however with a left shift, hypotension, and mild lactic acidosis.  Patient started empiric broad-spectrum antibiotics.  No clear source of infection identified.  Clinically improved weaned off vasopressors.  Procalcitonin significant elevated.  Continue with empiric antibiotics pending culture results.  Stable to transfer to the floor.  Awaiting floor bed.

## 2020-09-14 NOTE — NURSING
Pt has become increasingly more agitated and restless. Is attempting to get OOB and shouting that she wants to leave. Have attempted to redirect and reoriented. However, pt convinced she is somewhere terrible and needs to go to a hospital. Notified Dr. Tee. Haldol 1 mg IVP ordered and admin.

## 2020-09-14 NOTE — TELEPHONE ENCOUNTER
Spoke to Sadie at length. She was hoping that Dr. Godinez could assist with having this patient transferred to [facility] for the remainder of her hospital stay. I advised her that Dr. Godinez does not have the ability to make decisions for a patient who has been admitted to a hospital. I advised that she consult with the treating MD, nurses or social workers if the patient or family is requesting transfer.

## 2020-09-14 NOTE — ASSESSMENT & PLAN NOTE
Hypotension resolved.  Continue with carvedilol and will also restart long-acting dihydropyridine calcium channel blocker.

## 2020-09-14 NOTE — PROGRESS NOTES
"[facility] Hospital Medicine  Progress Note    Patient Name: Erin Sahu  MRN: [id number]  Patient Class: IP- Inpatient   Admission Date: 9/13/2020  Length of Stay: 1 days  Attending Physician: Law Tee MD  Primary Care Provider: Junior Godinez MD        Subjective:     Principal Problem:Sepsis        HPI:  Per Vikas Goode, NP:    "The patient is a 89 y.o. female who has a past medical history of coronary artery disease, diabetes mellitus type II, HHD (hypertensive heart disease), hyperlipidemia, hypertension, and syncope presented to [facility] with fever/chills and hypotension.  Patient states she started feeling weak and lethargic this morning, so her grand daughter called EMS.  On presentation to outside facility, patient was febrile and hypotensive requiring pressor support.  Her lactic was elevated above 3, so broad spectrum antibiotics initiated.  She was transferred to [facility] for continued intensive care."    Overview/Hospital Course:  Patient is a 89-year-old woman with hypertension, diabetes mellitus type 2, dyslipidemia, coronary artery disease who presented to [facility] with new onset of fevers and chills and hypotensive require vasopressors.  Patient with elevated white blood cell count and mild lactic acidosis.  Patient started empiric broad-spectrum antibiotics for possible sepsis.  No source of infection were identified.  Vasopressors weaned off on arrival.  Clinically improving on empiric antibiotics.      Interval History:  No acute event.    Review of Systems   Constitutional: Positive for fatigue. Negative for chills and fever.   Respiratory: Negative for shortness of breath and wheezing.    Cardiovascular: Negative for chest pain.   Gastrointestinal: Negative for abdominal distention, abdominal pain, constipation, diarrhea, nausea and vomiting.   Genitourinary: Negative for dysuria and frequency.   Musculoskeletal: Negative for arthralgias and " myalgias.   Neurological: Negative for light-headedness.   Psychiatric/Behavioral: Negative for agitation and confusion.     Objective:     Vital Signs (Most Recent):  Temp: 97.8 °F (36.6 °C) (09/14/20 0715)  Pulse: 74 (09/14/20 0745)  Resp: (!) 21 (09/14/20 0745)  BP: (!) 191/84 (09/14/20 0745)  SpO2: (!) 94 % (09/14/20 0745) Vital Signs (24h Range):  Temp:  [97.8 °F (36.6 °C)-98.6 °F (37 °C)] 97.8 °F (36.6 °C)  Pulse:  [60-74] 74  Resp:  [18-25] 21  SpO2:  [93 %-95 %] 94 %  BP: (130-205)/() 191/84     Weight: 84.4 kg (186 lb 1.1 oz)  Body mass index is 30.96 kg/m².    Intake/Output Summary (Last 24 hours) at 9/14/2020 0815  Last data filed at 9/14/2020 0600  Gross per 24 hour   Intake 2642 ml   Output 2100 ml   Net 542 ml      Physical Exam  Constitutional:       General: She is not in acute distress.     Appearance: She is well-developed. She is obese.   HENT:      Head: Atraumatic.   Eyes:      Conjunctiva/sclera: Conjunctivae normal.   Neck:      Musculoskeletal: Neck supple.   Cardiovascular:      Rate and Rhythm: Normal rate and regular rhythm.      Heart sounds: Murmur present.   Pulmonary:      Effort: Pulmonary effort is normal.      Breath sounds: No wheezing.      Comments: Distant lung sounds  Abdominal:      General: Bowel sounds are normal. There is no distension.      Palpations: Abdomen is soft.      Tenderness: There is no abdominal tenderness.   Musculoskeletal: Normal range of motion.         General: No deformity.   Neurological:      Mental Status: She is alert and oriented to person, place, and time.      Comments: Diffusely weak         Significant Labs: All pertinent labs within the past 24 hours have been reviewed.    Significant Imaging: I have reviewed all pertinent imaging results/findings within the past 24 hours.      Assessment/Plan:      * Sepsis  Patient presented with fever, tachypnea, with normal white blood cell count however with a left shift, hypotension, and mild lactic  acidosis.  Patient started empiric broad-spectrum antibiotics.  No clear source of infection identified.  Patient significant elevated procalcitonin.  Will repeat chest radiograph.  Clinically improved weaned off vasopressors.  Procalcitonin significant elevated.  Continue with empiric antibiotics pending culture results.  Stable to transfer to the floor.  Awaiting floor bed.    Paroxysmal atrial fibrillation  Currently in sinus rhythm.  Continue carvedilol and apixaban.  Patient should be on 5 mg twice daily (will increase).    Dyslipidemia  Continue statin therapy.    Essential hypertension  Hypotension resolved.  Continue with carvedilol and will also restart long-acting dihydropyridine calcium channel blocker.    Type 2 diabetes mellitus with diabetic neuropathy, without long-term current use of insulin  Well controlled with home oral regimen.  Continue with sliding scale insulin now.    Debility  Consult physical and occupational therapy.      VTE Risk Mitigation (From admission, onward)         Ordered     apixaban tablet 5 mg  2 times daily      09/14/20 0825     Reason for No Pharmacological VTE Prophylaxis  Once     Question:  Reasons:  Answer:  Already adequately anticoagulated on oral Anticoagulants    09/13/20 0008     Place HOLDEN hose  Until discontinued      09/13/20 0008     IP VTE HIGH RISK PATIENT  Once      09/13/20 0008     Place sequential compression device  Until discontinued      09/13/20 0008                Law Tee MD  Department of Hospital Medicine   [facility]

## 2020-09-14 NOTE — PLAN OF CARE
M/S orders. POC reviewed w/ patient & Kristyn. Questions answered.    Problem: Adult Inpatient Plan of Care  Goal: Plan of Care Review  Outcome: Ongoing, Progressing  Goal: Absence of Hospital-Acquired Illness or Injury  Outcome: Ongoing, Progressing     Problem: Diabetes Comorbidity  Goal: Blood Glucose Level Within Desired Range  Outcome: Ongoing, Progressing     Problem: Electrolyte Imbalance (Acute Kidney Injury/Impairment)  Goal: Serum Electrolyte Balance  Outcome: Ongoing, Progressing     Problem: Fluid Imbalance (Acute Kidney Injury/Impairment)  Goal: Optimal Fluid Balance  Outcome: Met     Problem: Oral Intake Inadequate (Acute Kidney Injury/Impairment)  Goal: Optimal Nutrition Intake  Outcome: Met     Problem: Renal Function Impairment (Acute Kidney Injury/Impairment)  Goal: Effective Renal Function  Outcome: Ongoing, Progressing     Problem: Infection  Goal: Infection Symptom Resolution  Outcome: Ongoing, Progressing     Problem: Adjustment to Illness (Sepsis/Septic Shock)  Goal: Optimal Coping  Outcome: Ongoing, Progressing     Problem: Hemodynamic Instability (Sepsis/Septic Shock)  Goal: Effective Tissue Perfusion  Outcome: Met     Problem: Infection (Sepsis/Septic Shock)  Goal: Absence of Infection Signs/Symptoms  Outcome: Ongoing, Progressing     Problem: Wound  Goal: Optimal Wound Healing  Outcome: Ongoing, Progressing     Problem: Skin Injury Risk Increased  Goal: Skin Health and Integrity  Outcome: Ongoing, Progressing

## 2020-09-14 NOTE — NURSING
Pt still very agitated and restless. Has attempted to punch and kick nursing staff while IC provided. Another dose of Haldol 2 mg admin per MD order.

## 2020-09-14 NOTE — ASSESSMENT & PLAN NOTE
Currently in sinus rhythm.  Continue carvedilol and apixaban.  Patient should be on 5 mg twice daily (will increase).

## 2020-09-14 NOTE — HOSPITAL COURSE
Patient is a 89-year-old woman with hypertension, diabetes mellitus type 2, dyslipidemia, coronary artery disease who presented to [facility] with new onset of fevers and chills and hypotensive require vasopressors.  Patient with elevated white blood cell count and mild lactic acidosis.  Patient started empiric broad-spectrum antibiotics for possible sepsis.  No immediate source of infection was identified.  Vasopressors weaned off and transferred out of the intensive care unit.      Blood cultures subsequently were positive for Pseudomonas aeruginosa.  Patient was continued on treatment with piperacillin/tazobactam.  Infectious disease service consulted and suspect source of infection likely gastrointestinal or genitourinary tract and recommended 2 weeks of intravenous antibiotic treatment.  Patient developed urinary retention and needed Nino catheterization.  Patient also developed diarrhea on 9/17/2020 and tested positive for C diff.  Patient started on oral vancomycin and her diarrhea resolved.  Endoscopic retrograde cholangiopancreatography considered however following input from the advanced endoscopy service it was decided not to pursue any endoscopic procedure at this time.    Skilled nursing therapy recommended by physical occupational therapy.  Patient discharged to skilled nursing facility to complete a course of intravenous antibiotics and oral vancomycin and to undergo further therapy prior to transitioning to home setting.

## 2020-09-14 NOTE — PLAN OF CARE
Safety maintained this shift. BP elevated. Scheduled coreg and nifedipine admin. SR on monitor. BS monitored before meals and SSI admin. Tylenol admin for HA. US ABD, US Kidneys, CXR completed at BS. Pt has been very anxious all shift. Has had frequesnt requests and has called granddaughter multiple times. Home dose of ativan ordered and admin to pt. No relief obtained. NAD noted. Will continue to monitor.

## 2020-09-14 NOTE — TELEPHONE ENCOUNTER
----- Message from Dana Kamara sent at 2020  2:04 PM CDT -----  Contact: Sadie/Daughter in Law  Erin Sahu  MRN: [id number]  : 1929  PCP: Junior Godinez  Home Phone      [phone]  Work Phone      Not on file.  Mobile          Not on file.      MESSAGE:   Was transferred to [facility] over the weekend and is currently on antibiotics there. She would like to discuss patient's condition to see if Dr. Godinez could find out if she could be transferred to [facility] instead. Please call to discuss and advise.    Phone: [phone]

## 2020-09-15 PROBLEM — E87.1 HYPONATREMIA: Status: ACTIVE | Noted: 2020-09-15

## 2020-09-15 LAB
ALBUMIN SERPL BCP-MCNC: 3 G/DL (ref 3.5–5.2)
ALP SERPL-CCNC: 150 U/L (ref 55–135)
ALT SERPL W/O P-5'-P-CCNC: 26 U/L (ref 10–44)
ANION GAP SERPL CALC-SCNC: 14 MMOL/L (ref 8–16)
AST SERPL-CCNC: 17 U/L (ref 10–40)
BASOPHILS # BLD AUTO: 0.03 K/UL (ref 0–0.2)
BASOPHILS NFR BLD: 0.3 % (ref 0–1.9)
BILIRUB SERPL-MCNC: 1.3 MG/DL (ref 0.1–1)
BUN SERPL-MCNC: 11 MG/DL (ref 10–30)
CALCIUM SERPL-MCNC: 8.9 MG/DL (ref 8.7–10.5)
CHLORIDE SERPL-SCNC: 93 MMOL/L (ref 95–110)
CO2 SERPL-SCNC: 23 MMOL/L (ref 23–29)
CREAT SERPL-MCNC: 0.8 MG/DL (ref 0.5–1.4)
DIFFERENTIAL METHOD: ABNORMAL
EOSINOPHIL # BLD AUTO: 0.1 K/UL (ref 0–0.5)
EOSINOPHIL NFR BLD: 1.4 % (ref 0–8)
ERYTHROCYTE [DISTWIDTH] IN BLOOD BY AUTOMATED COUNT: 12.2 % (ref 11.5–14.5)
EST. GFR  (AFRICAN AMERICAN): >60 ML/MIN/1.73 M^2
EST. GFR  (NON AFRICAN AMERICAN): >60 ML/MIN/1.73 M^2
GLUCOSE SERPL-MCNC: 203 MG/DL (ref 70–110)
HCT VFR BLD AUTO: 35.9 % (ref 37–48.5)
HGB BLD-MCNC: 12.9 G/DL (ref 12–16)
IMM GRANULOCYTES # BLD AUTO: 0.14 K/UL (ref 0–0.04)
IMM GRANULOCYTES NFR BLD AUTO: 1.6 % (ref 0–0.5)
LYMPHOCYTES # BLD AUTO: 1.9 K/UL (ref 1–4.8)
LYMPHOCYTES NFR BLD: 21.5 % (ref 18–48)
MAGNESIUM SERPL-MCNC: 1.5 MG/DL (ref 1.6–2.6)
MCH RBC QN AUTO: 32.7 PG (ref 27–31)
MCHC RBC AUTO-ENTMCNC: 35.9 G/DL (ref 32–36)
MCV RBC AUTO: 91 FL (ref 82–98)
MONOCYTES # BLD AUTO: 0.8 K/UL (ref 0.3–1)
MONOCYTES NFR BLD: 8.8 % (ref 4–15)
NEUTROPHILS # BLD AUTO: 5.9 K/UL (ref 1.8–7.7)
NEUTROPHILS NFR BLD: 66.4 % (ref 38–73)
NRBC BLD-RTO: 0 /100 WBC
PHOSPHATE SERPL-MCNC: 2.3 MG/DL (ref 2.7–4.5)
PLATELET # BLD AUTO: 142 K/UL (ref 150–350)
PMV BLD AUTO: 9.4 FL (ref 9.2–12.9)
POCT GLUCOSE: 160 MG/DL (ref 70–110)
POCT GLUCOSE: 167 MG/DL (ref 70–110)
POTASSIUM SERPL-SCNC: 3.9 MMOL/L (ref 3.5–5.1)
PROT SERPL-MCNC: 5.8 G/DL (ref 6–8.4)
RBC # BLD AUTO: 3.95 M/UL (ref 4–5.4)
SODIUM SERPL-SCNC: 130 MMOL/L (ref 136–145)
WBC # BLD AUTO: 8.88 K/UL (ref 3.9–12.7)

## 2020-09-15 PROCEDURE — 83735 ASSAY OF MAGNESIUM: CPT

## 2020-09-15 PROCEDURE — 63600175 PHARM REV CODE 636 W HCPCS: Performed by: HOSPITALIST

## 2020-09-15 PROCEDURE — 25000003 PHARM REV CODE 250: Performed by: NURSE PRACTITIONER

## 2020-09-15 PROCEDURE — 99233 SBSQ HOSP IP/OBS HIGH 50: CPT | Mod: ,,, | Performed by: INTERNAL MEDICINE

## 2020-09-15 PROCEDURE — 84100 ASSAY OF PHOSPHORUS: CPT

## 2020-09-15 PROCEDURE — 36415 COLL VENOUS BLD VENIPUNCTURE: CPT

## 2020-09-15 PROCEDURE — 87040 BLOOD CULTURE FOR BACTERIA: CPT

## 2020-09-15 PROCEDURE — 63600175 PHARM REV CODE 636 W HCPCS: Performed by: NURSE PRACTITIONER

## 2020-09-15 PROCEDURE — 25000003 PHARM REV CODE 250: Performed by: HOSPITALIST

## 2020-09-15 PROCEDURE — 25000003 PHARM REV CODE 250

## 2020-09-15 PROCEDURE — 85025 COMPLETE CBC W/AUTO DIFF WBC: CPT

## 2020-09-15 PROCEDURE — 99233 PR SUBSEQUENT HOSPITAL CARE,LEVL III: ICD-10-PCS | Mod: ,,, | Performed by: INTERNAL MEDICINE

## 2020-09-15 PROCEDURE — 25000003 PHARM REV CODE 250: Performed by: INTERNAL MEDICINE

## 2020-09-15 PROCEDURE — 63600175 PHARM REV CODE 636 W HCPCS: Performed by: INTERNAL MEDICINE

## 2020-09-15 PROCEDURE — 20000000 HC ICU ROOM

## 2020-09-15 PROCEDURE — 87086 URINE CULTURE/COLONY COUNT: CPT

## 2020-09-15 PROCEDURE — 80053 COMPREHEN METABOLIC PANEL: CPT

## 2020-09-15 RX ORDER — METOPROLOL TARTRATE 1 MG/ML
5 INJECTION, SOLUTION INTRAVENOUS ONCE
Status: COMPLETED | OUTPATIENT
Start: 2020-09-15 | End: 2020-09-15

## 2020-09-15 RX ORDER — MAGNESIUM SULFATE 1 G/100ML
1 INJECTION INTRAVENOUS ONCE
Status: COMPLETED | OUTPATIENT
Start: 2020-09-15 | End: 2020-09-15

## 2020-09-15 RX ORDER — METOPROLOL TARTRATE 1 MG/ML
INJECTION, SOLUTION INTRAVENOUS
Status: COMPLETED
Start: 2020-09-15 | End: 2020-09-15

## 2020-09-15 RX ADMIN — PIPERACILLIN SODIUM AND TAZOBACTAM SODIUM 4.5 G: 4; .5 INJECTION, POWDER, LYOPHILIZED, FOR SOLUTION INTRAVENOUS at 10:09

## 2020-09-15 RX ADMIN — HALOPERIDOL LACTATE 1 MG: 5 INJECTION, SOLUTION INTRAMUSCULAR at 06:09

## 2020-09-15 RX ADMIN — PRAVASTATIN SODIUM 20 MG: 20 TABLET ORAL at 09:09

## 2020-09-15 RX ADMIN — MAGNESIUM SULFATE 1 G: 1 INJECTION INTRAVENOUS at 08:09

## 2020-09-15 RX ADMIN — METOPROLOL TARTRATE 5 MG: 5 INJECTION, SOLUTION INTRAVENOUS at 09:09

## 2020-09-15 RX ADMIN — PIPERACILLIN SODIUM AND TAZOBACTAM SODIUM 4.5 G: 4; .5 INJECTION, POWDER, LYOPHILIZED, FOR SOLUTION INTRAVENOUS at 01:09

## 2020-09-15 RX ADMIN — HALOPERIDOL LACTATE 1 MG: 5 INJECTION, SOLUTION INTRAMUSCULAR at 11:09

## 2020-09-15 RX ADMIN — ACETAMINOPHEN 650 MG: 325 TABLET, FILM COATED ORAL at 01:09

## 2020-09-15 RX ADMIN — PIPERACILLIN SODIUM AND TAZOBACTAM SODIUM 4.5 G: 4; .5 INJECTION, POWDER, LYOPHILIZED, FOR SOLUTION INTRAVENOUS at 05:09

## 2020-09-15 RX ADMIN — CARVEDILOL 12.5 MG: 12.5 TABLET, FILM COATED ORAL at 09:09

## 2020-09-15 RX ADMIN — APIXABAN 5 MG: 2.5 TABLET, FILM COATED ORAL at 09:09

## 2020-09-15 NOTE — PROGRESS NOTES
"[facility] Hospital Medicine  Progress Note    Patient Name: Erin Sahu  MRN: [id number]  Patient Class: IP- Inpatient   Admission Date: 9/13/2020  Length of Stay: 2 days  Attending Physician: Darius White MD  Primary Care Provider: Junior Godinez MD        Subjective:     Principal Problem:Sepsis        HPI:  Per Vikas Goode, NP:    "The patient is a 89 y.o. female who has a past medical history of coronary artery disease, diabetes mellitus type II, HHD (hypertensive heart disease), hyperlipidemia, hypertension, and syncope presented to [place] with fever/chills and hypotension.  Patient states she started feeling weak and lethargic this morning, so her grand daughter called EMS.  On presentation to outside facility, patient was febrile and hypotensive requiring pressor support.  Her lactic was elevated above 3, so broad spectrum antibiotics initiated.  She was transferred to [facility] for continued intensive care."    Overview/Hospital Course:  Patient is a 89-year-old woman with hypertension, diabetes mellitus type 2, dyslipidemia, coronary artery disease who presented to [facility] with new onset of fevers and chills and hypotensive require vasopressors.  Patient with elevated white blood cell count and mild lactic acidosis.  Patient started empiric broad-spectrum antibiotics for possible sepsis.  No source of infection were identified.  Vasopressors weaned off on arrival.  Clinically improving on empiric antibiotics.      Interval History:  No acute event.    Review of Systems   Constitutional: Positive for fatigue. Negative for chills and fever.   HENT: Negative for ear pain.    Eyes: Negative for pain.   Respiratory: Negative for shortness of breath and wheezing.    Cardiovascular: Negative for chest pain.   Gastrointestinal: Negative for abdominal distention, abdominal pain, constipation, diarrhea, nausea and vomiting.   Genitourinary: Negative for dysuria and " frequency.   Musculoskeletal: Negative for arthralgias and myalgias.   Neurological: Negative for light-headedness.   Psychiatric/Behavioral: Positive for confusion. Negative for agitation.     Objective:     Vital Signs (Most Recent):  Temp: 98.2 °F (36.8 °C) (09/15/20 0700)  Pulse: (!) 116 (09/15/20 1037)  Resp: (!) 28 (09/15/20 1037)  BP: 134/80 (09/15/20 1037)  SpO2: (!) 94 % (09/15/20 1037) Vital Signs (24h Range):  Temp:  [98 °F (36.7 °C)-98.2 °F (36.8 °C)] 98.2 °F (36.8 °C)  Pulse:  [] 116  Resp:  [21-30] 28  SpO2:  [92 %-94 %] 94 %  BP: (134-175)/(67-99) 134/80     Weight: 84.4 kg (186 lb 1.1 oz)  Body mass index is 30.96 kg/m².    Intake/Output Summary (Last 24 hours) at 9/15/2020 1226  Last data filed at 9/15/2020 1031  Gross per 24 hour   Intake 740 ml   Output 1150 ml   Net -410 ml      Physical Exam  Constitutional:       General: She is not in acute distress.     Appearance: She is well-developed. She is obese.   HENT:      Head: Atraumatic.   Eyes:      Conjunctiva/sclera: Conjunctivae normal.   Neck:      Musculoskeletal: Neck supple.   Cardiovascular:      Rate and Rhythm: Normal rate and regular rhythm.      Heart sounds: Murmur present.   Pulmonary:      Effort: Pulmonary effort is normal.      Breath sounds: No wheezing.      Comments: Distant lung sounds  Abdominal:      General: Bowel sounds are normal. There is no distension.      Palpations: Abdomen is soft.      Tenderness: There is no abdominal tenderness.   Musculoskeletal: Normal range of motion.         General: No deformity.   Neurological:      Mental Status: She is alert and oriented to person, place, and time.      Comments: Diffusely weak         Significant Labs: All pertinent labs within the past 24 hours have been reviewed.    Significant Imaging: I have reviewed all pertinent imaging results/findings within the past 24 hours.      Assessment/Plan:      * Sepsis  Patient presented with fever, tachypnea, with normal white  "blood cell count however with a left shift, hypotension, and mild lactic acidosis.  Patient started empiric broad-spectrum antibiotics.  No clear source of infection identified.     Patient admitted to [facility] from 6/24 - 6/29 for Sepsis - "She was started on IV Vanc/Zosyn along with IV fluids. CT abdomen/pelvis and US abdomen was unremarkable. Hepatology was consulted given elevated LFTs and believe presentation is correlated with possibility of passed sludge/stone with possible cholangitis. Blood cx + GNRs. Switched Zosyn to Rocephin as E coli and Proteus sensitive to rocephin. Vitals, labs, and symptoms continued to improve.  Hepatology recommending EUS for possible ERCP, which patient declined given age, improvement in symptoms, and labs. Patient received a midline and will be discharged home with IV Cefriaxone for a total 14 day treatment for GNR bacteremia (estimated end date: 7/8/2020)".      CXR on Admission - New left retrocardiac atelectasis or consolidation.  US of Abd on 9/14/2020 - Extrahepatic common duct measures 9 mm, mildly increased in size compared to June 2020. Mild intrahepatic bile duct dilatation.  Please correlate with laboratory values.  US of Kidneys on 9/14/2020 - No evidence of hydronephrosis     Continued pneumobilia left hepatic lobe, similar compared to previous.    Labs on Admission:  COVID-19 Rapid negative  Lactic Acid - 3.9, 1.1, 1.2  ESR 34/.6  ProCal 12.07 and 10.67  Lipase normal    Cultures:  Blood x 2 on 9/12 - 1/4 positive for Klebsiella pneumonia - sensitivities pending    Plan:  Continue Zosyn  Consult GI  Repeat Blood cultures  Check Urine Culture    Hyponatremia  Na 129 on admission and 130 today.  Corrects to 131.65.  Will consider further work-up if needed        Debility  Consult physical and occupational therapy.      DINO (acute kidney injury)  Creatinine 1.6 on admission and improved to 0.8 after IV fluids  Monitor closely      Paroxysmal " atrial fibrillation  In sinus rhythm on admission.  Afib with RVR with HR in 130-140s in AM of 9/15/2020.  -IV Metoprolol 5mg x 1  -Continue carvedilol and apixaban.    -Consider Cardiology consult    Essential hypertension  Hypotension resolved.  Continue with carvedilol and will also restart long-acting dihydropyridine calcium channel blocker.    Type 2 diabetes mellitus with diabetic neuropathy, without long-term current use of insulin  Well controlled with home oral regimen.  Continue with sliding scale insulin now.    Dyslipidemia  Continue statin therapy.      VTE Risk Mitigation (From admission, onward)         Ordered     apixaban tablet 5 mg  2 times daily      09/14/20 0825     Reason for No Pharmacological VTE Prophylaxis  Once     Question:  Reasons:  Answer:  Already adequately anticoagulated on oral Anticoagulants    09/13/20 0008     Place HOLDEN hose  Until discontinued      09/13/20 0008     IP VTE HIGH RISK PATIENT  Once      09/13/20 0008     Place sequential compression device  Until discontinued      09/13/20 0008                Discharge Planning   LAITH:      Code Status: Full Code   Is the patient medically ready for discharge?:     Reason for patient still in hospital (select all that apply): Patient trending condition, Imaging and Consult recommendations  Discharge Plan A: Home Health                  Darius White MD  Department of Hospital Medicine   [facility]

## 2020-09-15 NOTE — PLAN OF CARE
Patient agitated and confused entire shift attempting to get out of bed. Prn med admin with moderate effects. Safety maintained. POC reviewed w/ patient- no evidence of understanding.    Problem: Adult Inpatient Plan of Care  Goal: Plan of Care Review  Outcome: Ongoing, Progressing  Goal: Patient-Specific Goal (Individualization)  Outcome: Ongoing, Progressing  Goal: Absence of Hospital-Acquired Illness or Injury  Outcome: Ongoing, Progressing  Goal: Optimal Comfort and Wellbeing  Outcome: Ongoing, Progressing     Problem: Diabetes Comorbidity  Goal: Blood Glucose Level Within Desired Range  Outcome: Ongoing, Progressing     Problem: Electrolyte Imbalance (Acute Kidney Injury/Impairment)  Goal: Serum Electrolyte Balance  Outcome: Ongoing, Progressing     Problem: Hematologic Alteration (Acute Kidney Injury/Impairment)  Goal: Hemoglobin, Hematocrit and Platelets Within Normal Range  Outcome: Ongoing, Progressing     Problem: Renal Function Impairment (Acute Kidney Injury/Impairment)  Goal: Effective Renal Function  Outcome: Ongoing, Progressing     Problem: Infection  Goal: Infection Symptom Resolution  Outcome: Ongoing, Progressing     Problem: Infection (Sepsis/Septic Shock)  Goal: Absence of Infection Signs/Symptoms  Outcome: Ongoing, Progressing     Problem: Wound  Goal: Optimal Wound Healing  Outcome: Ongoing, Progressing     Problem: Skin Injury Risk Increased  Goal: Skin Health and Integrity  Outcome: Ongoing, Progressing

## 2020-09-15 NOTE — SUBJECTIVE & OBJECTIVE
Interval History:  No acute event.    Review of Systems   Constitutional: Positive for fatigue. Negative for chills and fever.   HENT: Negative for ear pain.    Eyes: Negative for pain.   Respiratory: Negative for shortness of breath and wheezing.    Cardiovascular: Negative for chest pain.   Gastrointestinal: Negative for abdominal distention, abdominal pain, constipation, diarrhea, nausea and vomiting.   Genitourinary: Negative for dysuria and frequency.   Musculoskeletal: Negative for arthralgias and myalgias.   Neurological: Negative for light-headedness.   Psychiatric/Behavioral: Positive for confusion. Negative for agitation.     Objective:     Vital Signs (Most Recent):  Temp: 98.2 °F (36.8 °C) (09/15/20 0700)  Pulse: (!) 116 (09/15/20 1037)  Resp: (!) 28 (09/15/20 1037)  BP: 134/80 (09/15/20 1037)  SpO2: (!) 94 % (09/15/20 1037) Vital Signs (24h Range):  Temp:  [98 °F (36.7 °C)-98.2 °F (36.8 °C)] 98.2 °F (36.8 °C)  Pulse:  [] 116  Resp:  [21-30] 28  SpO2:  [92 %-94 %] 94 %  BP: (134-175)/(67-99) 134/80     Weight: 84.4 kg (186 lb 1.1 oz)  Body mass index is 30.96 kg/m².    Intake/Output Summary (Last 24 hours) at 9/15/2020 1226  Last data filed at 9/15/2020 1031  Gross per 24 hour   Intake 740 ml   Output 1150 ml   Net -410 ml      Physical Exam  Constitutional:       General: She is not in acute distress.     Appearance: She is well-developed. She is obese.   HENT:      Head: Atraumatic.   Eyes:      Conjunctiva/sclera: Conjunctivae normal.   Neck:      Musculoskeletal: Neck supple.   Cardiovascular:      Rate and Rhythm: Normal rate and regular rhythm.      Heart sounds: Murmur present.   Pulmonary:      Effort: Pulmonary effort is normal.      Breath sounds: No wheezing.      Comments: Distant lung sounds  Abdominal:      General: Bowel sounds are normal. There is no distension.      Palpations: Abdomen is soft.      Tenderness: There is no abdominal tenderness.   Musculoskeletal: Normal range of  motion.         General: No deformity.   Neurological:      Mental Status: She is alert and oriented to person, place, and time.      Comments: Diffusely weak         Significant Labs: All pertinent labs within the past 24 hours have been reviewed.    Significant Imaging: I have reviewed all pertinent imaging results/findings within the past 24 hours.

## 2020-09-15 NOTE — NURSING
Patient very confused and does not follow commands- swings at the nurse when attempting to do an assessment   In no acute distress   Upon Dr White entering the room she was able to state that she was in [place] and who the president was  But once she woke from nap she was confused but less aggressive however will not take po meds  Given Lopressor for afib and rate appears to be improving   Patient will not eat only took few sips of juice with much encouragement   Dr White aware  Picks at everything in the bed  Will continue to monitor

## 2020-09-15 NOTE — ASSESSMENT & PLAN NOTE
In sinus rhythm on admission.  Afib with RVR with HR in 130-140s in AM of 9/15/2020.  -IV Metoprolol 5mg x 1  -Continue carvedilol and apixaban.    -Consider Cardiology consult
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
## 2020-09-15 NOTE — ASSESSMENT & PLAN NOTE
Na 129 on admission and 130 today.  Corrects to 131.65.  Will consider further work-up if needed

## 2020-09-16 PROBLEM — A41.52 PSEUDOMONAS SEPTICEMIA: Status: ACTIVE | Noted: 2020-06-27

## 2020-09-16 LAB
ALBUMIN SERPL BCP-MCNC: 2.9 G/DL (ref 3.5–5.2)
ALP SERPL-CCNC: 118 U/L (ref 55–135)
ALT SERPL W/O P-5'-P-CCNC: 20 U/L (ref 10–44)
ANION GAP SERPL CALC-SCNC: 14 MMOL/L (ref 8–16)
AST SERPL-CCNC: 19 U/L (ref 10–40)
BACTERIA BLD CULT: ABNORMAL
BACTERIA UR CULT: NO GROWTH
BASOPHILS # BLD AUTO: 0.02 K/UL (ref 0–0.2)
BASOPHILS NFR BLD: 0.3 % (ref 0–1.9)
BILIRUB SERPL-MCNC: 1.2 MG/DL (ref 0.1–1)
BILIRUB UR QL STRIP: NEGATIVE
BUN SERPL-MCNC: 13 MG/DL (ref 10–30)
CALCIUM SERPL-MCNC: 8.6 MG/DL (ref 8.7–10.5)
CHLORIDE SERPL-SCNC: 95 MMOL/L (ref 95–110)
CLARITY UR: CLEAR
CO2 SERPL-SCNC: 24 MMOL/L (ref 23–29)
COLOR UR: YELLOW
CREAT SERPL-MCNC: 0.8 MG/DL (ref 0.5–1.4)
DIFFERENTIAL METHOD: ABNORMAL
EOSINOPHIL # BLD AUTO: 0.3 K/UL (ref 0–0.5)
EOSINOPHIL NFR BLD: 3.5 % (ref 0–8)
ERYTHROCYTE [DISTWIDTH] IN BLOOD BY AUTOMATED COUNT: 12.3 % (ref 11.5–14.5)
EST. GFR  (AFRICAN AMERICAN): >60 ML/MIN/1.73 M^2
EST. GFR  (NON AFRICAN AMERICAN): >60 ML/MIN/1.73 M^2
GLUCOSE SERPL-MCNC: 156 MG/DL (ref 70–110)
GLUCOSE UR QL STRIP: NEGATIVE
HCT VFR BLD AUTO: 33.4 % (ref 37–48.5)
HGB BLD-MCNC: 11.8 G/DL (ref 12–16)
HGB UR QL STRIP: ABNORMAL
HYALINE CASTS #/AREA URNS LPF: 0 /LPF
IMM GRANULOCYTES # BLD AUTO: 0.1 K/UL (ref 0–0.04)
IMM GRANULOCYTES NFR BLD AUTO: 1.4 % (ref 0–0.5)
KETONES UR QL STRIP: ABNORMAL
LEUKOCYTE ESTERASE UR QL STRIP: NEGATIVE
LYMPHOCYTES # BLD AUTO: 1.8 K/UL (ref 1–4.8)
LYMPHOCYTES NFR BLD: 25.3 % (ref 18–48)
MAGNESIUM SERPL-MCNC: 1.7 MG/DL (ref 1.6–2.6)
MCH RBC QN AUTO: 31.7 PG (ref 27–31)
MCHC RBC AUTO-ENTMCNC: 35.3 G/DL (ref 32–36)
MCV RBC AUTO: 90 FL (ref 82–98)
MICROSCOPIC COMMENT: NORMAL
MONOCYTES # BLD AUTO: 0.7 K/UL (ref 0.3–1)
MONOCYTES NFR BLD: 9.4 % (ref 4–15)
NEUTROPHILS # BLD AUTO: 4.3 K/UL (ref 1.8–7.7)
NEUTROPHILS NFR BLD: 60.1 % (ref 38–73)
NITRITE UR QL STRIP: NEGATIVE
NRBC BLD-RTO: 0 /100 WBC
PH UR STRIP: 7 [PH] (ref 5–8)
PHOSPHATE SERPL-MCNC: 3.3 MG/DL (ref 2.7–4.5)
PLATELET # BLD AUTO: 134 K/UL (ref 150–350)
PMV BLD AUTO: 9 FL (ref 9.2–12.9)
POCT GLUCOSE: 144 MG/DL (ref 70–110)
POCT GLUCOSE: 163 MG/DL (ref 70–110)
POCT GLUCOSE: 172 MG/DL (ref 70–110)
POCT GLUCOSE: 174 MG/DL (ref 70–110)
POCT GLUCOSE: 185 MG/DL (ref 70–110)
POTASSIUM SERPL-SCNC: 3.3 MMOL/L (ref 3.5–5.1)
PROT SERPL-MCNC: 5.3 G/DL (ref 6–8.4)
PROT UR QL STRIP: NEGATIVE
RBC # BLD AUTO: 3.72 M/UL (ref 4–5.4)
RBC #/AREA URNS HPF: 3 /HPF (ref 0–4)
SODIUM SERPL-SCNC: 133 MMOL/L (ref 136–145)
SP GR UR STRIP: 1.01 (ref 1–1.03)
SQUAMOUS #/AREA URNS HPF: 1 /HPF
URN SPEC COLLECT METH UR: ABNORMAL
UROBILINOGEN UR STRIP-ACNC: NEGATIVE EU/DL
WBC # BLD AUTO: 7.15 K/UL (ref 3.9–12.7)
WBC #/AREA URNS HPF: 1 /HPF (ref 0–5)

## 2020-09-16 PROCEDURE — 83735 ASSAY OF MAGNESIUM: CPT

## 2020-09-16 PROCEDURE — 87086 URINE CULTURE/COLONY COUNT: CPT

## 2020-09-16 PROCEDURE — 99223 1ST HOSP IP/OBS HIGH 75: CPT | Mod: ,,, | Performed by: INTERNAL MEDICINE

## 2020-09-16 PROCEDURE — 25000003 PHARM REV CODE 250: Performed by: INTERNAL MEDICINE

## 2020-09-16 PROCEDURE — 25000003 PHARM REV CODE 250: Performed by: NURSE PRACTITIONER

## 2020-09-16 PROCEDURE — 99233 PR SUBSEQUENT HOSPITAL CARE,LEVL III: ICD-10-PCS | Mod: ,,, | Performed by: INTERNAL MEDICINE

## 2020-09-16 PROCEDURE — 80053 COMPREHEN METABOLIC PANEL: CPT

## 2020-09-16 PROCEDURE — 85025 COMPLETE CBC W/AUTO DIFF WBC: CPT

## 2020-09-16 PROCEDURE — 99223 PR INITIAL HOSPITAL CARE,LEVL III: ICD-10-PCS | Mod: ,,, | Performed by: INTERNAL MEDICINE

## 2020-09-16 PROCEDURE — 63600175 PHARM REV CODE 636 W HCPCS: Performed by: INTERNAL MEDICINE

## 2020-09-16 PROCEDURE — 84100 ASSAY OF PHOSPHORUS: CPT

## 2020-09-16 PROCEDURE — 36415 COLL VENOUS BLD VENIPUNCTURE: CPT

## 2020-09-16 PROCEDURE — 11000001 HC ACUTE MED/SURG PRIVATE ROOM

## 2020-09-16 PROCEDURE — 99233 SBSQ HOSP IP/OBS HIGH 50: CPT | Mod: ,,, | Performed by: INTERNAL MEDICINE

## 2020-09-16 PROCEDURE — 81000 URINALYSIS NONAUTO W/SCOPE: CPT

## 2020-09-16 PROCEDURE — 25000003 PHARM REV CODE 250: Performed by: HOSPITALIST

## 2020-09-16 PROCEDURE — 63600175 PHARM REV CODE 636 W HCPCS: Performed by: NURSE PRACTITIONER

## 2020-09-16 PROCEDURE — 27000221 HC OXYGEN, UP TO 24 HOURS

## 2020-09-16 PROCEDURE — 99900035 HC TECH TIME PER 15 MIN (STAT)

## 2020-09-16 PROCEDURE — 94761 N-INVAS EAR/PLS OXIMETRY MLT: CPT

## 2020-09-16 RX ORDER — POTASSIUM CHLORIDE 20 MEQ/1
40 TABLET, EXTENDED RELEASE ORAL ONCE
Status: DISCONTINUED | OUTPATIENT
Start: 2020-09-16 | End: 2020-09-16

## 2020-09-16 RX ORDER — POTASSIUM CHLORIDE 7.45 MG/ML
10 INJECTION INTRAVENOUS
Status: COMPLETED | OUTPATIENT
Start: 2020-09-16 | End: 2020-09-16

## 2020-09-16 RX ADMIN — INSULIN ASPART 2 UNITS: 100 INJECTION, SOLUTION INTRAVENOUS; SUBCUTANEOUS at 11:09

## 2020-09-16 RX ADMIN — INSULIN ASPART 2 UNITS: 100 INJECTION, SOLUTION INTRAVENOUS; SUBCUTANEOUS at 07:09

## 2020-09-16 RX ADMIN — APIXABAN 5 MG: 2.5 TABLET, FILM COATED ORAL at 08:09

## 2020-09-16 RX ADMIN — NIFEDIPINE 30 MG: 30 TABLET, FILM COATED, EXTENDED RELEASE ORAL at 08:09

## 2020-09-16 RX ADMIN — CARVEDILOL 12.5 MG: 12.5 TABLET, FILM COATED ORAL at 08:09

## 2020-09-16 RX ADMIN — ASPIRIN 81 MG: 81 TABLET, COATED ORAL at 08:09

## 2020-09-16 RX ADMIN — PIPERACILLIN SODIUM AND TAZOBACTAM SODIUM 4.5 G: 4; .5 INJECTION, POWDER, LYOPHILIZED, FOR SOLUTION INTRAVENOUS at 03:09

## 2020-09-16 RX ADMIN — PANTOPRAZOLE SODIUM 40 MG: 40 TABLET, DELAYED RELEASE ORAL at 08:09

## 2020-09-16 RX ADMIN — PIPERACILLIN SODIUM AND TAZOBACTAM SODIUM 4.5 G: 4; .5 INJECTION, POWDER, LYOPHILIZED, FOR SOLUTION INTRAVENOUS at 05:09

## 2020-09-16 RX ADMIN — PRAVASTATIN SODIUM 20 MG: 20 TABLET ORAL at 08:09

## 2020-09-16 RX ADMIN — PIPERACILLIN SODIUM AND TAZOBACTAM SODIUM 4.5 G: 4; .5 INJECTION, POWDER, LYOPHILIZED, FOR SOLUTION INTRAVENOUS at 09:09

## 2020-09-16 RX ADMIN — POTASSIUM CHLORIDE 10 MEQ: 7.46 INJECTION, SOLUTION INTRAVENOUS at 08:09

## 2020-09-16 RX ADMIN — ACETAMINOPHEN 650 MG: 325 TABLET, FILM COATED ORAL at 11:09

## 2020-09-16 RX ADMIN — POTASSIUM CHLORIDE 10 MEQ: 7.46 INJECTION, SOLUTION INTRAVENOUS at 09:09

## 2020-09-16 NOTE — NURSING
Charting showed no UOP last night.  BS completed showing >1L.  Pt denies abdominal pain.  See new orders.  Will continue to monitor.

## 2020-09-16 NOTE — ASSESSMENT & PLAN NOTE
- etiology is unclear to me but GI/ sources are the most likely  - given history of bacteremia, GI seems more likely  - agree with ERCP  - continue abx (could de-escalate to cefepime but would avoid quinolones in this elderly patient, if possible)  - will probably need 2 weeks of parenteral therapy  - d/w patient and granddaughter

## 2020-09-16 NOTE — HPI
Patient is a 89-year-old woman with hypertension, diabetes mellitus type 2, dyslipidemia, coronary artery disease who presented to [facility] with new onset of fevers and chills and hypotensive require vasopressors.  Patient with elevated white blood cell count and mild lactic acidosis.  Patient started empiric broad-spectrum antibiotics for possible sepsis.  No source of infection were identified.  Vasopressors weaned off on arrival.  Clinically improving on abx. Blood cultures grew Pseudomonas. ID is consulted. The patient is alert but lethargic. Grand daughter is at bedside. She is feeling better. Notes reviewed.

## 2020-09-16 NOTE — ASSESSMENT & PLAN NOTE
Hypotension resolved.  Continue with carvedilol and long-acting dihydropyridine calcium channel blocker.

## 2020-09-16 NOTE — ASSESSMENT & PLAN NOTE
In sinus rhythm on admission.  Afib with RVR with HR in 130-140s in AM of 9/15/2020.  IV Metoprolol 5mg x 1 given.  In NSR this morning  -Continue carvedilol and apixaban.    -Consider Cardiology consult

## 2020-09-16 NOTE — NURSING TRANSFER
Nursing Transfer Note      9/16/2020     Transfer To: [id number]    Transfer via bed    Transfer with cardiac monitoring    Transported by Transport and RN     Medicines sent: none    Chart send with patient: Yes    Notified: granddaughter    Patient reassessed at: 9/16, 1510    Upon arrival to floor: cardiac monitor applied, patient oriented to room, call bell in reach and bed in lowest position    Report given to OLAMIDE Askew.  All questions addressed.  Azar in room with patient when ICU RN left.  Belongings in bag + cell phone left at bedside with patient.

## 2020-09-16 NOTE — CONSULTS
[facility]  Infectious Disease  Consult Note    Patient Name: Erin Sahu  MRN: [id number]  Admission Date: 9/13/2020  Hospital Length of Stay: 3 days  Attending Physician: Darius White MD  Primary Care Provider: Junior Godinez MD     Isolation Status: No active isolations    Patient information was obtained from relative(s), past medical records and ER records.      Inpatient consult to Infectious Diseases  Consult performed by: Maikel Garcia MD  Consult ordered by: Darius White MD        Assessment/Plan:     Pseudomonas septicemia  - etiology is unclear to me but GI/ sources are the most likely  - given history of bacteremia, GI seems more likely  - agree with ERCP  - continue abx (could de-escalate to cefepime but would avoid quinolones in this elderly patient, if possible)  - will probably need 2 weeks of parenteral therapy  - d/w patient and granddaughter      Thank you for your consult. I will follow-up with patient. Please contact us if you have any additional questions.    Maikel Garcia MD  Infectious Disease  [facility]    Subjective:     Principal Problem: Sepsis    HPI: Patient is a 89-year-old woman with hypertension, diabetes mellitus type 2, dyslipidemia, coronary artery disease who presented to [facility] with new onset of fevers and chills and hypotensive require vasopressors.  Patient with elevated white blood cell count and mild lactic acidosis.  Patient started empiric broad-spectrum antibiotics for possible sepsis.  No source of infection were identified.  Vasopressors weaned off on arrival.  Clinically improving on abx. Blood cultures grew Pseudomonas. ID is consulted. The patient is alert but lethargic. Grand daughter is at bedside. She is feeling better. Notes reviewed.     Past Medical History:   Diagnosis Date    Anxiety     Arthritis     Coronary artery disease     Diabetes mellitus type II     HHD (hypertensive  heart disease)     Hyperlipidemia     Hypertension     Mitral valve disease     Pacemaker     Syncope and collapse        Past Surgical History:   Procedure Laterality Date    BACK SURGERY      BREAST SURGERY      CARDIAC PACEMAKER PLACEMENT  01/23/2009    CHOLECYSTECTOMY      CORONARY ANGIOPLASTY WITH STENT PLACEMENT  03/2001    EYE SURGERY Bilateral 06/2001    cataracts    HEMORRHOID SURGERY      HYSTERECTOMY      REPLACEMENT OF PACEMAKER GENERATOR N/A 5/1/2019    Procedure: REPLACEMENT, PACEMAKER GENERATOR;  Surgeon: Ezio Mclean MD;  Location: [facility] CATH;  Service: Cardiology;  Laterality: N/A;    ROTATOR CUFF REPAIR Right     TOTAL KNEE ARTHROPLASTY Right        Review of patient's allergies indicates:   Allergen Reactions    Iodinated contrast media Hives    Vancomycin analogues Itching       Medications:  Medications Prior to Admission   Medication Sig    acetaminophen (TYLENOL) 325 MG tablet Take 325 mg by mouth every 6 (six) hours as needed for Pain.    alcohol swabs PadM Apply 1 each topically once daily.    apixaban (ELIQUIS) 2.5 mg Tab Take 1 tablet (2.5 mg total) by mouth 2 (two) times daily.    aspirin (ECOTRIN) 81 MG EC tablet Take 1 tablet (81 mg total) by mouth once daily.    carvediloL (COREG) 12.5 MG tablet Take 12.5 mg by mouth 2 (two) times daily.    cloNIDine (CATAPRES) 0.1 MG tablet Take 0.1 mg by mouth as needed (hypertension).    diabetic supplies, miscellan. Misc TRUEPLUS SUPER THIN 28G LANCET. USE AS DIRECTED TO TEST BLOOD SUGAR TWICE DAILY.    furosemide (LASIX) 20 MG tablet Take 1 tablet (20 mg total) by mouth every other day.    gabapentin (NEURONTIN) 300 MG capsule Take 1 capsule (300 mg total) by mouth 3 (three) times daily.    irbesartan (AVAPRO) 150 MG tablet Take 150 mg by mouth once daily.    LORazepam (ATIVAN) 0.5 MG tablet TAKE 1 TABLET EVERY 8 HOURS AS NEEDED    metFORMIN (GLUCOPHAGE) 500 MG tablet Take 1 tablet (500 mg total) by mouth every  evening.    nitroGLYCERIN (NITROSTAT) 0.4 MG SL tablet Place 1 tablet (0.4 mg total) under the tongue every 5 (five) minutes as needed.    pantoprazole (PROTONIX) 40 MG tablet TAKE 1 TABLET (40 MG TOTAL) BY MOUTH ONCE DAILY.    potassium chloride SA (K-DUR,KLOR-CON) 20 MEQ tablet Take 20 mEq by mouth once daily.    pravastatin (PRAVACHOL) 20 MG tablet Take 1 tablet (20 mg total) by mouth nightly.    TRUE METRIX GLUCOSE TEST STRIP Strp USE TO TEST BLOOD SUGARS 2 TIMES DAILY.     Antibiotics (From admission, onward)    Start     Stop Route Frequency Ordered    09/13/20 0200  piperacillin-tazobactam 4.5 g in dextrose 5 % 100 mL IVPB (ready to mix system)      -- IV Every 8 hours (non-standard times) 09/13/20 0047        Antifungals (From admission, onward)    None        Antivirals (From admission, onward)    None           Immunization History   Administered Date(s) Administered    Influenza - High Dose - PF (65 years and older) 10/08/2010, 09/23/2011, 10/03/2012, 09/24/2013, 10/14/2014, 10/22/2015, 10/11/2016, 10/19/2017, 11/30/2018, 09/23/2019    Pneumococcal Conjugate - 13 Valent 01/17/2019    Pneumococcal Polysaccharide - 23 Valent 12/01/2002    Td - PF (ADULT) 03/09/2020       Family History     None        Social History     Socioeconomic History    Marital status:      Spouse name: Not on file    Number of children: Not on file    Years of education: Not on file    Highest education level: Not on file   Occupational History    Not on file   Social Needs    Financial resource strain: Not on file    Food insecurity     Worry: Not on file     Inability: Not on file    Transportation needs     Medical: Not on file     Non-medical: Not on file   Tobacco Use    Smoking status: Never Smoker    Smokeless tobacco: Never Used   Substance and Sexual Activity    Alcohol use: No    Drug use: No    Sexual activity: Not on file   Lifestyle    Physical activity     Days per week: Not on file      Minutes per session: Not on file    Stress: To some extent   Relationships    Social connections     Talks on phone: Not on file     Gets together: Not on file     Attends [employer] service: Not on file     Active member of club or organization: Not on file     Attends meetings of clubs or organizations: Not on file     Relationship status: Not on file   Other Topics Concern    Not on file   Social History Narrative    Not on file     Review of Systems   Unable to perform ROS: Other     Objective:     Vital Signs (Most Recent):  Temp: 98.8 °F (37.1 °C) (09/16/20 1127)  Pulse: 60 (09/16/20 1127)  Resp: 16 (09/16/20 1127)  BP: 132/71 (09/16/20 1127)  SpO2: (!) 93 % (09/16/20 1127) Vital Signs (24h Range):  Temp:  [97.9 °F (36.6 °C)-98.8 °F (37.1 °C)] 98.8 °F (37.1 °C)  Pulse:  [60-82] 60  Resp:  [16-27] 16  SpO2:  [92 %-99 %] 93 %  BP: (132-167)/(71-86) 132/71     Weight: 84.4 kg (186 lb 1.1 oz)  Body mass index is 30.96 kg/m².    Estimated Creatinine Clearance: 49.2 mL/min (based on SCr of 0.8 mg/dL).    Physical Exam  Vitals signs and nursing note reviewed.   Constitutional:       General: She is not in acute distress.     Appearance: Normal appearance. She is not ill-appearing, toxic-appearing or diaphoretic.   HENT:      Head: Normocephalic and atraumatic.      Right Ear: External ear normal.      Left Ear: External ear normal.      Nose: Nose normal.   Eyes:      Conjunctiva/sclera: Conjunctivae normal.      Pupils: Pupils are equal, round, and reactive to light.   Cardiovascular:      Rate and Rhythm: Normal rate and regular rhythm.      Heart sounds: No murmur.   Pulmonary:      Effort: Pulmonary effort is normal.      Breath sounds: Normal breath sounds. No wheezing or rales.   Abdominal:      General: There is distension.      Palpations: There is no mass.      Tenderness: There is no abdominal tenderness. There is no right CVA tenderness, left CVA tenderness or guarding.   Musculoskeletal: Normal range  of motion.         General: No tenderness or signs of injury.   Skin:     General: Skin is warm.      Findings: No erythema or rash.   Neurological:      General: No focal deficit present.      Mental Status: She is alert.   Psychiatric:         Behavior: Behavior normal.         Significant Labs:   Blood Culture:   Recent Labs   Lab 06/25/20  1758 06/25/20  1759 06/28/20  1045 09/12/20  1456 09/15/20  1424   LABBLOO Gram stain aer bottle: Gram negative rods   Results called to and read back by: Louis Garduno RN  06/28/2020  09:15  ESCHERICHIA COLI  For susceptibility see order #[id number]  * No growth after 5 days. No growth after 5 days. Gram stain aer bottle: Gram negative rods  Results called to and read back by:Janet Mascorro RN 09/14/2020  10:49  PSEUDOMONAS AERUGINOSA*  No Growth to date  No Growth to date  No Growth to date  No Growth to date No Growth to date  No Growth to date     CBC:   Recent Labs   Lab 09/15/20  0347 09/16/20  0339   WBC 8.88 7.15   HGB 12.9 11.8*   HCT 35.9* 33.4*   * 134*     CMP:   Recent Labs   Lab 09/15/20  0347 09/16/20  0339   * 133*   K 3.9 3.3*   CL 93* 95   CO2 23 24   * 156*   BUN 11 13   CREATININE 0.8 0.8   CALCIUM 8.9 8.6*   PROT 5.8* 5.3*   ALBUMIN 3.0* 2.9*   BILITOT 1.3* 1.2*   ALKPHOS 150* 118   AST 17 19   ALT 26 20   ANIONGAP 14 14   EGFRNONAA >60 >60     Procalcitonin: No results for input(s): PROCAL in the last 48 hours.  Respiratory Culture: No results for input(s): GSRESP, RESPIRATORYC in the last 4320 hours.  Urine Culture:   Recent Labs   Lab 06/24/20  2222   LABURIN No growth       Significant Imaging: I have reviewed all pertinent imaging results/findings within the past 24 hours.

## 2020-09-16 NOTE — NURSING
Received pt from ICU via bed. VSS and afebrile.  No complaints at this time.  Nino and telemetry in place.  Pt to be turned q 2hrs.  1 liter NC of O2.  HOLDEN and SCDs in place.  Pt on diabetic diet.  Bed in lowest locked position.  Call light in reach.  Pt AAOx3.  Answers questions appropriately.  Will continue to monitor.

## 2020-09-16 NOTE — PROGRESS NOTES
"[facility] Hospital Medicine  Progress Note    Patient Name: Erin Sahu  MRN: [id number]  Patient Class: IP- Inpatient   Admission Date: 9/13/2020  Length of Stay: 3 days  Attending Physician: Darius White MD  Primary Care Provider: Junior Godinez MD        Subjective:     Principal Problem:Sepsis        HPI:  Per Vikas Goode, NP:    "The patient is a 89 y.o. female who has a past medical history of coronary artery disease, diabetes mellitus type II, HHD (hypertensive heart disease), hyperlipidemia, hypertension, and syncope presented to [facility] with fever/chills and hypotension.  Patient states she started feeling weak and lethargic this morning, so her grand daughter called EMS.  On presentation to outside facility, patient was febrile and hypotensive requiring pressor support.  Her lactic was elevated above 3, so broad spectrum antibiotics initiated.  She was transferred to [facility] for continued intensive care."    Overview/Hospital Course:  Patient is a 89-year-old woman with hypertension, diabetes mellitus type 2, dyslipidemia, coronary artery disease who presented to [facility] with new onset of fevers and chills and hypotensive require vasopressors.  Patient with elevated white blood cell count and mild lactic acidosis.  Patient started empiric broad-spectrum antibiotics for possible sepsis.  No source of infection were identified.  Vasopressors weaned off on arrival.  Clinically improving on empiric antibiotics.      Interval History:  No acute events reported except for confusion last night.  Patient awake and oriented to person, place, month/year, president.      Review of Systems   Constitutional: Positive for fatigue. Negative for chills and fever.   HENT: Negative for ear pain.    Eyes: Negative for pain.   Respiratory: Negative for shortness of breath and wheezing.    Cardiovascular: Negative for chest pain.   Gastrointestinal: Negative for abdominal " distention, abdominal pain, constipation, diarrhea, nausea and vomiting.   Genitourinary: Negative for dysuria and frequency.   Musculoskeletal: Negative for arthralgias and myalgias.   Neurological: Negative for light-headedness.   Psychiatric/Behavioral: Positive for confusion. Negative for agitation.     Objective:     Vital Signs (Most Recent):  Temp: 97.9 °F (36.6 °C) (09/16/20 0745)  Pulse: 68 (09/16/20 0758)  Resp: 20 (09/16/20 0758)  BP: (!) 142/77 (09/16/20 0758)  SpO2: 99 % (09/16/20 0758) Vital Signs (24h Range):  Temp:  [97.9 °F (36.6 °C)-98.2 °F (36.8 °C)] 97.9 °F (36.6 °C)  Pulse:  [] 68  Resp:  [19-28] 20  SpO2:  [92 %-99 %] 99 %  BP: (134-167)/(74-86) 142/77     Weight: 84.4 kg (186 lb 1.1 oz)  Body mass index is 30.96 kg/m².    Intake/Output Summary (Last 24 hours) at 9/16/2020 1034  Last data filed at 9/16/2020 1033  Gross per 24 hour   Intake 945 ml   Output 950 ml   Net -5 ml      Physical Exam  Constitutional:       General: She is not in acute distress.     Appearance: She is well-developed. She is obese.   HENT:      Head: Atraumatic.   Eyes:      Conjunctiva/sclera: Conjunctivae normal.   Neck:      Musculoskeletal: Neck supple.   Cardiovascular:      Rate and Rhythm: Normal rate and regular rhythm.      Heart sounds: Murmur present.   Pulmonary:      Effort: Pulmonary effort is normal.      Breath sounds: No wheezing.      Comments: Distant lung sounds  Abdominal:      General: Bowel sounds are normal. There is no distension.      Palpations: Abdomen is soft.      Tenderness: There is no abdominal tenderness.   Musculoskeletal: Normal range of motion.         General: No deformity.   Neurological:      Mental Status: She is alert and oriented to person, place, and time.      Comments: Diffusely weak         Significant Labs: All pertinent labs within the past 24 hours have been reviewed.    Significant Imaging: I have reviewed all pertinent imaging results/findings within the past 24  "hours.      Assessment/Plan:      * Sepsis  Patient presented with fever, tachypnea, with normal white blood cell count however with a left shift, hypotension, and mild lactic acidosis.  Patient started empiric broad-spectrum antibiotics.  No clear source of infection identified - Lung vs GI.   Improving with normal BP and HR today.    Patient admitted to [facility] from 6/24 - 6/29 for Sepsis - "She was started on IV Vanc/Zosyn along with IV fluids. CT abdomen/pelvis and US abdomen was unremarkable. Hepatology was consulted given elevated LFTs and believe presentation is correlated with possibility of passed sludge/stone with possible cholangitis. Blood cx + GNRs. Switched Zosyn to Rocephin as E coli and Proteus sensitive to rocephin. Vitals, labs, and symptoms continued to improve.  Hepatology recommending EUS for possible ERCP, which patient declined given age, improvement in symptoms, and labs. Patient received a midline and will be discharged home with IV Cefriaxone for a total 14 day treatment for GNR bacteremia (estimated end date: 7/8/2020)".      CXR on Admission - New left retrocardiac atelectasis or consolidation.  US of Abd on 9/14/2020 - Extrahepatic common duct measures 9 mm, mildly increased in size compared to June 2020. Mild intrahepatic bile duct dilatation.  Please correlate with laboratory values.  US of Kidneys on 9/14/2020 - No evidence of hydronephrosis     Continued pneumobilia left hepatic lobe, similar compared to previous.    Labs on Admission:  COVID-19 Rapid negative  Lactic Acid - 3.9, 1.1, 1.2  ESR 34/.6  ProCal 12.07 and 10.67  Lipase normal    Cultures:  Blood x 2 on 9/12 - 1/4 positive for Klebsiella pneumonia - sensitive  Blood culture x 2 on 9/15 - NGTD  Urine Culture on 9/15 pending    Consulted GI - Discussed with Dr. Vines on 9/15.  Thinks clinically that cholangitis most likely cause of bacteremia.  Recommended transfer to [facility] for ERCP, but Dr. Ratliff with " advanced endoscopy declined transfer as patient declined ERCP in past and he does not think this is Cholangitis and does not think ERCP is warranted.    Plan:  Continue Zosyn - day #4  Consult ID    Hyponatremia  Na 129 on admission and 133 today.  Will consider further work-up if needed        Debility  Consult physical and occupational therapy.      DINO (acute kidney injury)  Creatinine 1.6 on admission and improved to 0.8 after IV fluids  Monitor closely      Paroxysmal atrial fibrillation  In sinus rhythm on admission.  Afib with RVR with HR in 130-140s in AM of 9/15/2020.  IV Metoprolol 5mg x 1 given.  In NSR this morning  -Continue carvedilol and apixaban.    -Consider Cardiology consult    Essential hypertension  Hypotension resolved.  Continue with carvedilol and long-acting dihydropyridine calcium channel blocker.    Type 2 diabetes mellitus with diabetic neuropathy, without long-term current use of insulin  Well controlled with home oral regimen.  Continue with sliding scale insulin now.    Dyslipidemia  Continue statin therapy.      VTE Risk Mitigation (From admission, onward)         Ordered     apixaban tablet 5 mg  2 times daily      09/14/20 0825     Reason for No Pharmacological VTE Prophylaxis  Once     Question:  Reasons:  Answer:  Already adequately anticoagulated on oral Anticoagulants    09/13/20 0008     Place HOLDEN hose  Until discontinued      09/13/20 0008     IP VTE HIGH RISK PATIENT  Once      09/13/20 0008     Place sequential compression device  Until discontinued      09/13/20 0008                Discharge Planning   LAITH:      Code Status: Full Code   Is the patient medically ready for discharge?:     Reason for patient still in hospital (select all that apply): Patient trending condition, Treatment and Consult recommendations  Discharge Plan A: Home Health                  Darius White MD  Department of Hospital Medicine   [facility]

## 2020-09-16 NOTE — CONSULTS
Gastroenterology Consult    09/16/2020     Reason for consult: dilated bile ducts  Consulting physician: Dr. White    Chief Complaint: fever    HPI: Ms. Erin Sahu is a 89 y.o. woman with a history of CAD, DM2, HTN, HLD, s/p pacemaker, MV disease who presented as transfer for fevers, chills, hypotension and was found to have Pseudomonas septicemia.  Patient is a little confused and unable to provide history. Granddaughter is at bedside. States patient has improved since admission but is not back to baseline.  Reports patient had a similar episode in June at which time she had Pseudomonas bacteremia which was thought to possibly due to cholangitis however patient declined EUS/ERCP and was treated with antibiotics and ultimately improved until this past Saturday.      Past Medical History:   Diagnosis Date    Anxiety     Arthritis     Coronary artery disease     Diabetes mellitus type II     HHD (hypertensive heart disease)     Hyperlipidemia     Hypertension     Mitral valve disease     Pacemaker     Syncope and collapse        Past Surgical History:   Procedure Laterality Date    BACK SURGERY      BREAST SURGERY      CARDIAC PACEMAKER PLACEMENT  01/23/2009    CHOLECYSTECTOMY      CORONARY ANGIOPLASTY WITH STENT PLACEMENT  03/2001    EYE SURGERY Bilateral 06/2001    cataracts    HEMORRHOID SURGERY      HYSTERECTOMY      REPLACEMENT OF PACEMAKER GENERATOR N/A 5/1/2019    Procedure: REPLACEMENT, PACEMAKER GENERATOR;  Surgeon: Ezio Mclean MD;  Location: [facility] CATH;  Service: Cardiology;  Laterality: N/A;    ROTATOR CUFF REPAIR Right     TOTAL KNEE ARTHROPLASTY Right        Social History     Tobacco Use    Smoking status: Never Smoker    Smokeless tobacco: Never Used   Substance Use Topics    Alcohol use: No    Drug use: No     Family hx: unable to obtain due to patient clinical status    No current facility-administered medications on file prior to encounter.      Current Outpatient  Medications on File Prior to Encounter   Medication Sig Dispense Refill    acetaminophen (TYLENOL) 325 MG tablet Take 325 mg by mouth every 6 (six) hours as needed for Pain.      alcohol swabs PadM Apply 1 each topically once daily. 100 each 6    apixaban (ELIQUIS) 2.5 mg Tab Take 1 tablet (2.5 mg total) by mouth 2 (two) times daily.      aspirin (ECOTRIN) 81 MG EC tablet Take 1 tablet (81 mg total) by mouth once daily. 1 Bottle 1    carvediloL (COREG) 12.5 MG tablet Take 12.5 mg by mouth 2 (two) times daily.      cloNIDine (CATAPRES) 0.1 MG tablet Take 0.1 mg by mouth as needed (hypertension).      diabetic supplies, miscellan. Misc TRUEPLUS SUPER THIN 28G LANCET. USE AS DIRECTED TO TEST BLOOD SUGAR TWICE DAILY. 100 each 12    furosemide (LASIX) 20 MG tablet Take 1 tablet (20 mg total) by mouth every other day.      gabapentin (NEURONTIN) 300 MG capsule Take 1 capsule (300 mg total) by mouth 3 (three) times daily. 270 capsule 1    irbesartan (AVAPRO) 150 MG tablet Take 150 mg by mouth once daily.      LORazepam (ATIVAN) 0.5 MG tablet TAKE 1 TABLET EVERY 8 HOURS AS NEEDED 90 tablet 1    metFORMIN (GLUCOPHAGE) 500 MG tablet Take 1 tablet (500 mg total) by mouth every evening. 90 tablet 3    nitroGLYCERIN (NITROSTAT) 0.4 MG SL tablet Place 1 tablet (0.4 mg total) under the tongue every 5 (five) minutes as needed. 30 tablet 1    pantoprazole (PROTONIX) 40 MG tablet TAKE 1 TABLET (40 MG TOTAL) BY MOUTH ONCE DAILY. 90 tablet 1    potassium chloride SA (K-DUR,KLOR-CON) 20 MEQ tablet Take 20 mEq by mouth once daily.      pravastatin (PRAVACHOL) 20 MG tablet Take 1 tablet (20 mg total) by mouth nightly. 90 tablet 1    TRUE METRIX GLUCOSE TEST STRIP Strp USE TO TEST BLOOD SUGARS 2 TIMES DAILY. 200 strip 12        Review of patient's allergies indicates:   Allergen Reactions    Iodinated contrast media Hives    Vancomycin analogues Itching       Review of Systems   Unable to perform ROS: Mental acuity     A  complete review of systems is otherwise negative except as above.    Vitals:    09/16/20 0315 09/16/20 0745 09/16/20 0758 09/16/20 1127   BP:   (!) 142/77 132/71   BP Location:   Left arm    Patient Position:   Lying    Pulse:   68 60   Resp:   20 16   Temp: 97.9 °F (36.6 °C) 97.9 °F (36.6 °C)  98.8 °F (37.1 °C)   TempSrc: Oral Axillary  Axillary   SpO2:   99% (!) 93%   Weight:       Height:         Physical Exam:  GEN: NAD, pleasant, non-toxic appearing  HEENT: NCAT, EOMI, anicteric sclera, pink conjunctiva, moist mucous membranes  Neck: supple, normal ROM  CV: regular rate and rhythm, no murmurs  Pulm: CTAB, no wheeze/rhonchi/rales  ABD: soft, non-tender, normal bowel sounds  Ext: normal range of motion, no edema  Skin: normal color and temperature  Neuro: alert, answering some questions, no gross focal deficits  Psych: appropriate mood and affect    Scheduled Meds:   apixaban  5 mg Oral BID    aspirin  81 mg Oral Daily    carvediloL  12.5 mg Oral BID    NIFEdipine  30 mg Oral Daily    pantoprazole  40 mg Oral Daily    piperacillin-tazobactam (ZOSYN) IVPB  4.5 g Intravenous Q8H    pravastatin  20 mg Oral Nightly     Continuous Infusions:   lactated ringers 75 mL/hr at 09/16/20 1100     PRN Meds:.acetaminophen, dextrose 50%, dextrose 50%, glucagon (human recombinant), glucose, glucose, haloperidol lactate, influenza, insulin aspart U-100, LORazepam, ondansetron, sodium chloride 0.9%    Pertinent labs and imaging studies from the last 24hours personally reviewed.    Results for JOHN MENJIVAR (MRN [id number]) as of 9/16/2020 14:43   Ref. Range 9/16/2020 03:39   WBC Latest Ref Range: 3.90 - 12.70 K/uL 7.15   RBC Latest Ref Range: 4.00 - 5.40 M/uL 3.72 (L)   Hemoglobin Latest Ref Range: 12.0 - 16.0 g/dL 11.8 (L)   Hematocrit Latest Ref Range: 37.0 - 48.5 % 33.4 (L)   MCV Latest Ref Range: 82 - 98 fL 90   MCH Latest Ref Range: 27.0 - 31.0 pg 31.7 (H)   MCHC Latest Ref Range: 32.0 - 36.0 g/dL 35.3   RDW Latest Ref  Range: 11.5 - 14.5 % 12.3   Platelets Latest Ref Range: 150 - 350 K/uL 134 (L)   MPV Latest Ref Range: 9.2 - 12.9 fL 9.0 (L)   Gran% Latest Ref Range: 38.0 - 73.0 % 60.1   Gran # (ANC) Latest Ref Range: 1.8 - 7.7 K/uL 4.3   Lymph% Latest Ref Range: 18.0 - 48.0 % 25.3   Lymph # Latest Ref Range: 1.0 - 4.8 K/uL 1.8   Mono% Latest Ref Range: 4.0 - 15.0 % 9.4   Mono # Latest Ref Range: 0.3 - 1.0 K/uL 0.7   Eosinophil% Latest Ref Range: 0.0 - 8.0 % 3.5   Eos # Latest Ref Range: 0.0 - 0.5 K/uL 0.3   Basophil% Latest Ref Range: 0.0 - 1.9 % 0.3   Baso # Latest Ref Range: 0.00 - 0.20 K/uL 0.02   nRBC Latest Ref Range: 0 /100 WBC 0   Differential Method Unknown Automated   Immature Grans (Abs) Latest Ref Range: 0.00 - 0.04 K/uL 0.10 (H)   Immature Granulocytes Latest Ref Range: 0.0 - 0.5 % 1.4 (H)   Sodium Latest Ref Range: 136 - 145 mmol/L 133 (L)   Potassium Latest Ref Range: 3.5 - 5.1 mmol/L 3.3 (L)   Chloride Latest Ref Range: 95 - 110 mmol/L 95   CO2 Latest Ref Range: 23 - 29 mmol/L 24   Anion Gap Latest Ref Range: 8 - 16 mmol/L 14   BUN, Bld Latest Ref Range: 10 - 30 mg/dL 13   Creatinine Latest Ref Range: 0.5 - 1.4 mg/dL 0.8   eGFR if non African American Latest Ref Range: >60 mL/min/1.73 m^2 >60   eGFR if  Latest Ref Range: >60 mL/min/1.73 m^2 >60   Glucose Latest Ref Range: 70 - 110 mg/dL 156 (H)   Calcium Latest Ref Range: 8.7 - 10.5 mg/dL 8.6 (L)   Phosphorus Latest Ref Range: 2.7 - 4.5 mg/dL 3.3   Magnesium Latest Ref Range: 1.6 - 2.6 mg/dL 1.7   Alkaline Phosphatase Latest Ref Range: 55 - 135 U/L 118   PROTEIN TOTAL Latest Ref Range: 6.0 - 8.4 g/dL 5.3 (L)   Albumin Latest Ref Range: 3.5 - 5.2 g/dL 2.9 (L)   BILIRUBIN TOTAL Latest Ref Range: 0.1 - 1.0 mg/dL 1.2 (H)   AST Latest Ref Range: 10 - 40 U/L 19   ALT Latest Ref Range: 10 - 44 U/L 20     RUQ US 9/14/20  Extrahepatic common duct measures 9 mm, mildly increased in size compared to June 2020.  Mild intrahepatic bile duct dilatation.  Please  correlate with laboratory values.     Continued pneumobilia left hepatic lobe, similar compared to previous.    Assessment:   Ms. Erin Sahu is a 89 y.o. woman with a history of CAD, DM2, HTN, HLD, s/p pacemaker, MV disease who presented as transfer for fevers, chills, hypotension and was found to have Pseudomonas septicemia. She was hospitalized in June for bacteremia. At that time LFTs were elevated in cholestatic pattern and EUS/ERCP was recommended due to suspected cholangitis however patient refused procedure.  This admission, TBili is normal, alk phos was previously elevated but now normal.  US this admission shows extrahepatic duct 9mm (was 1.2 in June 202), which can be normal in the setting of prior cholecystectomy.  Findings this admission are not as consistent with ascending cholangitis as prior findings but cannot be ruled out.    Recommendations:  - Pt cannot have MRCP due to pacemaker. Transfer for EUS/ERCP declined.  - Continue antibiotics  - Follow up with GI outpatient and consider EUS/ERCP if patient is amenable to endoscopy.    Thank you for the consult. Please don't hesitate to call if there are additional questions or concerns.    Rene Parson MD

## 2020-09-16 NOTE — ASSESSMENT & PLAN NOTE
"Patient presented with fever, tachypnea, with normal white blood cell count however with a left shift, hypotension, and mild lactic acidosis.  Patient started empiric broad-spectrum antibiotics.  No clear source of infection identified - Lung vs GI.   Improving with normal BP and HR today.    Patient admitted to [facility] from 6/24 - 6/29 for Sepsis - "She was started on IV Vanc/Zosyn along with IV fluids. CT abdomen/pelvis and US abdomen was unremarkable. Hepatology was consulted given elevated LFTs and believe presentation is correlated with possibility of passed sludge/stone with possible cholangitis. Blood cx + GNRs. Switched Zosyn to Rocephin as E coli and Proteus sensitive to rocephin. Vitals, labs, and symptoms continued to improve.  Hepatology recommending EUS for possible ERCP, which patient declined given age, improvement in symptoms, and labs. Patient received a midline and will be discharged home with IV Cefriaxone for a total 14 day treatment for GNR bacteremia (estimated end date: 7/8/2020)".      CXR on Admission - New left retrocardiac atelectasis or consolidation.  US of Abd on 9/14/2020 - Extrahepatic common duct measures 9 mm, mildly increased in size compared to June 2020. Mild intrahepatic bile duct dilatation.  Please correlate with laboratory values.  US of Kidneys on 9/14/2020 - No evidence of hydronephrosis     Continued pneumobilia left hepatic lobe, similar compared to previous.    Labs on Admission:  COVID-19 Rapid negative  Lactic Acid - 3.9, 1.1, 1.2  ESR 34/.6  ProCal 12.07 and 10.67  Lipase normal    Cultures:  Blood x 2 on 9/12 - 1/4 positive for Klebsiella pneumonia - sensitive  Blood culture x 2 on 9/15 - NGTD  Urine Culture on 9/15 pending    Consulted GI - Discussed with Dr. Vines on 9/15.  Thinks clinically that cholangitis most likely cause of bacteremia.  Recommended transfer to [facility] for ERCP, but Dr. Ratliff with advanced endoscopy declined transfer as " patient declined ERCP in past and he does not think this is Cholangitis and does not think ERCP is warranted.    Plan:  Continue Zosyn - day #4  Consult ID

## 2020-09-16 NOTE — SUBJECTIVE & OBJECTIVE
Interval History:  No acute events reported except for confusion last night.  Patient awake and oriented to person, place, month/year, president.      Review of Systems   Constitutional: Positive for fatigue. Negative for chills and fever.   HENT: Negative for ear pain.    Eyes: Negative for pain.   Respiratory: Negative for shortness of breath and wheezing.    Cardiovascular: Negative for chest pain.   Gastrointestinal: Negative for abdominal distention, abdominal pain, constipation, diarrhea, nausea and vomiting.   Genitourinary: Negative for dysuria and frequency.   Musculoskeletal: Negative for arthralgias and myalgias.   Neurological: Negative for light-headedness.   Psychiatric/Behavioral: Positive for confusion. Negative for agitation.     Objective:     Vital Signs (Most Recent):  Temp: 97.9 °F (36.6 °C) (09/16/20 0745)  Pulse: 68 (09/16/20 0758)  Resp: 20 (09/16/20 0758)  BP: (!) 142/77 (09/16/20 0758)  SpO2: 99 % (09/16/20 0758) Vital Signs (24h Range):  Temp:  [97.9 °F (36.6 °C)-98.2 °F (36.8 °C)] 97.9 °F (36.6 °C)  Pulse:  [] 68  Resp:  [19-28] 20  SpO2:  [92 %-99 %] 99 %  BP: (134-167)/(74-86) 142/77     Weight: 84.4 kg (186 lb 1.1 oz)  Body mass index is 30.96 kg/m².    Intake/Output Summary (Last 24 hours) at 9/16/2020 1034  Last data filed at 9/16/2020 1033  Gross per 24 hour   Intake 945 ml   Output 950 ml   Net -5 ml      Physical Exam  Constitutional:       General: She is not in acute distress.     Appearance: She is well-developed. She is obese.   HENT:      Head: Atraumatic.   Eyes:      Conjunctiva/sclera: Conjunctivae normal.   Neck:      Musculoskeletal: Neck supple.   Cardiovascular:      Rate and Rhythm: Normal rate and regular rhythm.      Heart sounds: Murmur present.   Pulmonary:      Effort: Pulmonary effort is normal.      Breath sounds: No wheezing.      Comments: Distant lung sounds  Abdominal:      General: Bowel sounds are normal. There is no distension.      Palpations:  Abdomen is soft.      Tenderness: There is no abdominal tenderness.   Musculoskeletal: Normal range of motion.         General: No deformity.   Neurological:      Mental Status: She is alert and oriented to person, place, and time.      Comments: Diffusely weak         Significant Labs: All pertinent labs within the past 24 hours have been reviewed.    Significant Imaging: I have reviewed all pertinent imaging results/findings within the past 24 hours.

## 2020-09-16 NOTE — SUBJECTIVE & OBJECTIVE
Past Medical History:   Diagnosis Date    Anxiety     Arthritis     Coronary artery disease     Diabetes mellitus type II     HHD (hypertensive heart disease)     Hyperlipidemia     Hypertension     Mitral valve disease     Pacemaker     Syncope and collapse        Past Surgical History:   Procedure Laterality Date    BACK SURGERY      BREAST SURGERY      CARDIAC PACEMAKER PLACEMENT  01/23/2009    CHOLECYSTECTOMY      CORONARY ANGIOPLASTY WITH STENT PLACEMENT  03/2001    EYE SURGERY Bilateral 06/2001    cataracts    HEMORRHOID SURGERY      HYSTERECTOMY      REPLACEMENT OF PACEMAKER GENERATOR N/A 5/1/2019    Procedure: REPLACEMENT, PACEMAKER GENERATOR;  Surgeon: Ezio Mclean MD;  [facility];  Service: Cardiology;  Laterality: N/A;    ROTATOR CUFF REPAIR Right     TOTAL KNEE ARTHROPLASTY Right        Review of patient's allergies indicates:   Allergen Reactions    Iodinated contrast media Hives    Vancomycin analogues Itching       Medications:  Medications Prior to Admission   Medication Sig    acetaminophen (TYLENOL) 325 MG tablet Take 325 mg by mouth every 6 (six) hours as needed for Pain.    alcohol swabs PadM Apply 1 each topically once daily.    apixaban (ELIQUIS) 2.5 mg Tab Take 1 tablet (2.5 mg total) by mouth 2 (two) times daily.    aspirin (ECOTRIN) 81 MG EC tablet Take 1 tablet (81 mg total) by mouth once daily.    carvediloL (COREG) 12.5 MG tablet Take 12.5 mg by mouth 2 (two) times daily.    cloNIDine (CATAPRES) 0.1 MG tablet Take 0.1 mg by mouth as needed (hypertension).    diabetic supplies, miscellan. Misc TRUEPLUS SUPER THIN 28G LANCET. USE AS DIRECTED TO TEST BLOOD SUGAR TWICE DAILY.    furosemide (LASIX) 20 MG tablet Take 1 tablet (20 mg total) by mouth every other day.    gabapentin (NEURONTIN) 300 MG capsule Take 1 capsule (300 mg total) by mouth 3 (three) times daily.    irbesartan (AVAPRO) 150 MG tablet Take 150 mg by mouth once daily.    LORazepam  (ATIVAN) 0.5 MG tablet TAKE 1 TABLET EVERY 8 HOURS AS NEEDED    metFORMIN (GLUCOPHAGE) 500 MG tablet Take 1 tablet (500 mg total) by mouth every evening.    nitroGLYCERIN (NITROSTAT) 0.4 MG SL tablet Place 1 tablet (0.4 mg total) under the tongue every 5 (five) minutes as needed.    pantoprazole (PROTONIX) 40 MG tablet TAKE 1 TABLET (40 MG TOTAL) BY MOUTH ONCE DAILY.    potassium chloride SA (K-DUR,KLOR-CON) 20 MEQ tablet Take 20 mEq by mouth once daily.    pravastatin (PRAVACHOL) 20 MG tablet Take 1 tablet (20 mg total) by mouth nightly.    TRUE METRIX GLUCOSE TEST STRIP Strp USE TO TEST BLOOD SUGARS 2 TIMES DAILY.     Antibiotics (From admission, onward)    Start     Stop Route Frequency Ordered    09/13/20 0200  piperacillin-tazobactam 4.5 g in dextrose 5 % 100 mL IVPB (ready to mix system)      -- IV Every 8 hours (non-standard times) 09/13/20 0047        Antifungals (From admission, onward)    None        Antivirals (From admission, onward)    None           Immunization History   Administered Date(s) Administered    Influenza - High Dose - PF (65 years and older) 10/08/2010, 09/23/2011, 10/03/2012, 09/24/2013, 10/14/2014, 10/22/2015, 10/11/2016, 10/19/2017, 11/30/2018, 09/23/2019    Pneumococcal Conjugate - 13 Valent 01/17/2019    Pneumococcal Polysaccharide - 23 Valent 12/01/2002    Td - PF (ADULT) 03/09/2020       Family History     None        Social History     Socioeconomic History    Marital status:      Spouse name: Not on file    Number of children: Not on file    Years of education: Not on file    Highest education level: Not on file   Occupational History    Not on file   Social Needs    Financial resource strain: Not on file    Food insecurity     Worry: Not on file     Inability: Not on file    Transportation needs     Medical: Not on file     Non-medical: Not on file   Tobacco Use    Smoking status: Never Smoker    Smokeless tobacco: Never Used   Substance and Sexual  Activity    Alcohol use: No    Drug use: No    Sexual activity: Not on file   Lifestyle    Physical activity     Days per week: Not on file     Minutes per session: Not on file    Stress: To some extent   Relationships    Social connections     Talks on phone: Not on file     Gets together: Not on file     Attends Baptism service: Not on file     Active member of club or organization: Not on file     Attends meetings of clubs or organizations: Not on file     Relationship status: Not on file   Other Topics Concern    Not on file   Social History Narrative    Not on file     Review of Systems   Unable to perform ROS: Other     Objective:     Vital Signs (Most Recent):  Temp: 98.8 °F (37.1 °C) (09/16/20 1127)  Pulse: 60 (09/16/20 1127)  Resp: 16 (09/16/20 1127)  BP: 132/71 (09/16/20 1127)  SpO2: (!) 93 % (09/16/20 1127) Vital Signs (24h Range):  Temp:  [97.9 °F (36.6 °C)-98.8 °F (37.1 °C)] 98.8 °F (37.1 °C)  Pulse:  [60-82] 60  Resp:  [16-27] 16  SpO2:  [92 %-99 %] 93 %  BP: (132-167)/(71-86) 132/71     Weight: 84.4 kg (186 lb 1.1 oz)  Body mass index is 30.96 kg/m².    Estimated Creatinine Clearance: 49.2 mL/min (based on SCr of 0.8 mg/dL).    Physical Exam  Vitals signs and nursing note reviewed.   Constitutional:       General: She is not in acute distress.     Appearance: Normal appearance. She is not ill-appearing, toxic-appearing or diaphoretic.   HENT:      Head: Normocephalic and atraumatic.      Right Ear: External ear normal.      Left Ear: External ear normal.      Nose: Nose normal.   Eyes:      Conjunctiva/sclera: Conjunctivae normal.      Pupils: Pupils are equal, round, and reactive to light.   Cardiovascular:      Rate and Rhythm: Normal rate and regular rhythm.      Heart sounds: No murmur.   Pulmonary:      Effort: Pulmonary effort is normal.      Breath sounds: Normal breath sounds. No wheezing or rales.   Abdominal:      General: There is distension.      Palpations: There is no mass.       Tenderness: There is no abdominal tenderness. There is no right CVA tenderness, left CVA tenderness or guarding.   Musculoskeletal: Normal range of motion.         General: No tenderness or signs of injury.   Skin:     General: Skin is warm.      Findings: No erythema or rash.   Neurological:      General: No focal deficit present.      Mental Status: She is alert.   Psychiatric:         Behavior: Behavior normal.         Significant Labs:   Blood Culture:   Recent Labs   Lab 06/25/20  1758 06/25/20  1759 06/28/20  1045 09/12/20  1456 09/15/20  1424   LABBLOO Gram stain aer bottle: Gram negative rods   Results called to and read back by: Louis Garduno RN  06/28/2020  09:15  ESCHERICHIA COLI  For susceptibility see order #[id number]  * No growth after 5 days. No growth after 5 days. Gram stain aer bottle: Gram negative rods  Results called to and read back by:Janet Mascorro RN 09/14/2020  10:49  PSEUDOMONAS AERUGINOSA*  No Growth to date  No Growth to date  No Growth to date  No Growth to date No Growth to date  No Growth to date     CBC:   Recent Labs   Lab 09/15/20  0347 09/16/20  0339   WBC 8.88 7.15   HGB 12.9 11.8*   HCT 35.9* 33.4*   * 134*     CMP:   Recent Labs   Lab 09/15/20  0347 09/16/20  0339   * 133*   K 3.9 3.3*   CL 93* 95   CO2 23 24   * 156*   BUN 11 13   CREATININE 0.8 0.8   CALCIUM 8.9 8.6*   PROT 5.8* 5.3*   ALBUMIN 3.0* 2.9*   BILITOT 1.3* 1.2*   ALKPHOS 150* 118   AST 17 19   ALT 26 20   ANIONGAP 14 14   EGFRNONAA >60 >60     Procalcitonin: No results for input(s): PROCAL in the last 48 hours.  Respiratory Culture: No results for input(s): GSRESP, RESPIRATORYC in the last 4320 hours.  Urine Culture:   Recent Labs   Lab 06/24/20  2222   LABURIN No growth       Significant Imaging: I have reviewed all pertinent imaging results/findings within the past 24 hours.

## 2020-09-17 PROBLEM — A41.52 PSEUDOMONAS SEPTICEMIA: Status: RESOLVED | Noted: 2020-06-27 | Resolved: 2020-09-17

## 2020-09-17 PROBLEM — A41.52 SEPSIS DUE TO PSEUDOMONAS SPECIES: Status: ACTIVE | Noted: 2020-09-13

## 2020-09-17 LAB
ANION GAP SERPL CALC-SCNC: 12 MMOL/L (ref 8–16)
BACTERIA UR CULT: NO GROWTH
BASOPHILS # BLD AUTO: 0.03 K/UL (ref 0–0.2)
BASOPHILS NFR BLD: 0.4 % (ref 0–1.9)
BUN SERPL-MCNC: 11 MG/DL (ref 10–30)
CALCIUM SERPL-MCNC: 8.3 MG/DL (ref 8.7–10.5)
CHLORIDE SERPL-SCNC: 97 MMOL/L (ref 95–110)
CO2 SERPL-SCNC: 23 MMOL/L (ref 23–29)
CREAT SERPL-MCNC: 0.8 MG/DL (ref 0.5–1.4)
DIFFERENTIAL METHOD: ABNORMAL
EOSINOPHIL # BLD AUTO: 0.4 K/UL (ref 0–0.5)
EOSINOPHIL NFR BLD: 4.8 % (ref 0–8)
ERYTHROCYTE [DISTWIDTH] IN BLOOD BY AUTOMATED COUNT: 12.4 % (ref 11.5–14.5)
EST. GFR  (AFRICAN AMERICAN): >60 ML/MIN/1.73 M^2
EST. GFR  (NON AFRICAN AMERICAN): >60 ML/MIN/1.73 M^2
GLUCOSE SERPL-MCNC: 195 MG/DL (ref 70–110)
HCT VFR BLD AUTO: 35.2 % (ref 37–48.5)
HGB BLD-MCNC: 12.7 G/DL (ref 12–16)
IMM GRANULOCYTES # BLD AUTO: 0.12 K/UL (ref 0–0.04)
IMM GRANULOCYTES NFR BLD AUTO: 1.6 % (ref 0–0.5)
LYMPHOCYTES # BLD AUTO: 1.3 K/UL (ref 1–4.8)
LYMPHOCYTES NFR BLD: 16.8 % (ref 18–48)
MAGNESIUM SERPL-MCNC: 1.5 MG/DL (ref 1.6–2.6)
MCH RBC QN AUTO: 32.8 PG (ref 27–31)
MCHC RBC AUTO-ENTMCNC: 36.1 G/DL (ref 32–36)
MCV RBC AUTO: 91 FL (ref 82–98)
MONOCYTES # BLD AUTO: 0.6 K/UL (ref 0.3–1)
MONOCYTES NFR BLD: 7.2 % (ref 4–15)
NEUTROPHILS # BLD AUTO: 5.4 K/UL (ref 1.8–7.7)
NEUTROPHILS NFR BLD: 69.2 % (ref 38–73)
NRBC BLD-RTO: 0 /100 WBC
PHOSPHATE SERPL-MCNC: 2.8 MG/DL (ref 2.7–4.5)
PLATELET # BLD AUTO: 144 K/UL (ref 150–350)
PMV BLD AUTO: 9.4 FL (ref 9.2–12.9)
POCT GLUCOSE: 174 MG/DL (ref 70–110)
POCT GLUCOSE: 186 MG/DL (ref 70–110)
POTASSIUM SERPL-SCNC: 3.4 MMOL/L (ref 3.5–5.1)
RBC # BLD AUTO: 3.87 M/UL (ref 4–5.4)
SODIUM SERPL-SCNC: 132 MMOL/L (ref 136–145)
WBC # BLD AUTO: 7.74 K/UL (ref 3.9–12.7)

## 2020-09-17 PROCEDURE — 99900035 HC TECH TIME PER 15 MIN (STAT)

## 2020-09-17 PROCEDURE — 36410 VNPNXR 3YR/> PHY/QHP DX/THER: CPT

## 2020-09-17 PROCEDURE — 97535 SELF CARE MNGMENT TRAINING: CPT

## 2020-09-17 PROCEDURE — 99233 SBSQ HOSP IP/OBS HIGH 50: CPT | Mod: ,,, | Performed by: INTERNAL MEDICINE

## 2020-09-17 PROCEDURE — C1751 CATH, INF, PER/CENT/MIDLINE: HCPCS

## 2020-09-17 PROCEDURE — 94761 N-INVAS EAR/PLS OXIMETRY MLT: CPT

## 2020-09-17 PROCEDURE — 63600175 PHARM REV CODE 636 W HCPCS: Performed by: INTERNAL MEDICINE

## 2020-09-17 PROCEDURE — 25000003 PHARM REV CODE 250: Performed by: INTERNAL MEDICINE

## 2020-09-17 PROCEDURE — 85025 COMPLETE CBC W/AUTO DIFF WBC: CPT

## 2020-09-17 PROCEDURE — 97166 OT EVAL MOD COMPLEX 45 MIN: CPT

## 2020-09-17 PROCEDURE — 11000001 HC ACUTE MED/SURG PRIVATE ROOM

## 2020-09-17 PROCEDURE — 83735 ASSAY OF MAGNESIUM: CPT

## 2020-09-17 PROCEDURE — 80048 BASIC METABOLIC PNL TOTAL CA: CPT

## 2020-09-17 PROCEDURE — 36415 COLL VENOUS BLD VENIPUNCTURE: CPT

## 2020-09-17 PROCEDURE — 97161 PT EVAL LOW COMPLEX 20 MIN: CPT

## 2020-09-17 PROCEDURE — 76937 US GUIDE VASCULAR ACCESS: CPT

## 2020-09-17 PROCEDURE — 99233 PR SUBSEQUENT HOSPITAL CARE,LEVL III: ICD-10-PCS | Mod: ,,, | Performed by: INTERNAL MEDICINE

## 2020-09-17 PROCEDURE — 27000221 HC OXYGEN, UP TO 24 HOURS

## 2020-09-17 PROCEDURE — 84100 ASSAY OF PHOSPHORUS: CPT

## 2020-09-17 RX ORDER — LANOLIN ALCOHOL/MO/W.PET/CERES
400 CREAM (GRAM) TOPICAL 2 TIMES DAILY
Status: COMPLETED | OUTPATIENT
Start: 2020-09-17 | End: 2020-09-19

## 2020-09-17 RX ORDER — POTASSIUM CHLORIDE 1.5 G/1.58G
20 POWDER, FOR SOLUTION ORAL ONCE
Status: COMPLETED | OUTPATIENT
Start: 2020-09-17 | End: 2020-09-17

## 2020-09-17 RX ADMIN — INSULIN ASPART 1 UNITS: 100 INJECTION, SOLUTION INTRAVENOUS; SUBCUTANEOUS at 09:09

## 2020-09-17 RX ADMIN — PRAVASTATIN SODIUM 20 MG: 20 TABLET ORAL at 09:09

## 2020-09-17 RX ADMIN — APIXABAN 5 MG: 2.5 TABLET, FILM COATED ORAL at 09:09

## 2020-09-17 RX ADMIN — ACETAMINOPHEN 650 MG: 325 TABLET, FILM COATED ORAL at 09:09

## 2020-09-17 RX ADMIN — POTASSIUM CHLORIDE 20 MEQ: 1.5 POWDER, FOR SOLUTION ORAL at 02:09

## 2020-09-17 RX ADMIN — NIFEDIPINE 30 MG: 30 TABLET, FILM COATED, EXTENDED RELEASE ORAL at 08:09

## 2020-09-17 RX ADMIN — PIPERACILLIN SODIUM AND TAZOBACTAM SODIUM 4.5 G: 4; .5 INJECTION, POWDER, LYOPHILIZED, FOR SOLUTION INTRAVENOUS at 05:09

## 2020-09-17 RX ADMIN — APIXABAN 5 MG: 2.5 TABLET, FILM COATED ORAL at 08:09

## 2020-09-17 RX ADMIN — ACETAMINOPHEN 650 MG: 325 TABLET, FILM COATED ORAL at 02:09

## 2020-09-17 RX ADMIN — CARVEDILOL 12.5 MG: 12.5 TABLET, FILM COATED ORAL at 08:09

## 2020-09-17 RX ADMIN — PIPERACILLIN SODIUM AND TAZOBACTAM SODIUM 4.5 G: 4; .5 INJECTION, POWDER, LYOPHILIZED, FOR SOLUTION INTRAVENOUS at 09:09

## 2020-09-17 RX ADMIN — ASPIRIN 81 MG: 81 TABLET, COATED ORAL at 08:09

## 2020-09-17 RX ADMIN — MAGNESIUM OXIDE TAB 400 MG (241.3 MG ELEMENTAL MG) 400 MG: 400 (241.3 MG) TAB at 09:09

## 2020-09-17 RX ADMIN — PIPERACILLIN SODIUM AND TAZOBACTAM SODIUM 4.5 G: 4; .5 INJECTION, POWDER, LYOPHILIZED, FOR SOLUTION INTRAVENOUS at 02:09

## 2020-09-17 RX ADMIN — ACETAMINOPHEN 650 MG: 325 TABLET, FILM COATED ORAL at 08:09

## 2020-09-17 RX ADMIN — PANTOPRAZOLE SODIUM 40 MG: 40 TABLET, DELAYED RELEASE ORAL at 08:09

## 2020-09-17 RX ADMIN — CARVEDILOL 12.5 MG: 12.5 TABLET, FILM COATED ORAL at 09:09

## 2020-09-17 NOTE — PLAN OF CARE
VSS and afebrile, AAOx4. Dressings are CDI, no pain reported this shift. Pt able to swallow pills but is not eating very much food. Pt on 2L/NC. Heels floated. Plan of care reviewed with patient. Purposeful rounding done, call light at bed side, bed at lowest position, brakes on, non-skid socks on, will continue to monitor.

## 2020-09-17 NOTE — ASSESSMENT & PLAN NOTE
Hypotensive on admission - resolved.  Home Meds:  Carvedilol 12.5mg bid, Irbesartan 150mg qday, Lasix 20mg QOD, Clonidine 0.1mg prn  Hospital Meds:  Carvedilol 12.5mg bid and Nifedipine XR 30mg daili  -Continue and monitor

## 2020-09-17 NOTE — ASSESSMENT & PLAN NOTE
Home Meds:  Metformin 500mg qHAS  A1C was 6.7 on admission  -Continue with sliding scale insulin now.

## 2020-09-17 NOTE — ASSESSMENT & PLAN NOTE
"Patient presented with fever, tachypnea, with normal white blood cell count however with a left shift, hypotension, and mild lactic acidosis.  Patient started empiric broad-spectrum antibiotics.  No clear source of infection identified - ?GI.   Improved with normal BP and HR today.    Patient admitted to [facility] [address] from 6/24 - 6/29 for Sepsis - "She was started on IV Vanc/Zosyn along with IV fluids. CT abdomen/pelvis and US abdomen was unremarkable. Hepatology was consulted given elevated LFTs and believe presentation is correlated with possibility of passed sludge/stone with possible cholangitis. Blood cx + GNRs. Switched Zosyn to Rocephin as E coli and Proteus sensitive to rocephin. Vitals, labs, and symptoms continued to improve.  Hepatology recommending EUS for possible ERCP, which patient declined given age, improvement in symptoms, and labs. Patient received a midline and will be discharged home with IV Cefriaxone for a total 14 day treatment for GNR bacteremia (estimated end date: 7/8/2020)".      CXR on Admission - New left retrocardiac atelectasis or consolidation.  US of Abd on 9/14/2020 - Extrahepatic common duct measures 9 mm, mildly increased in size compared to June 2020. Mild intrahepatic bile duct dilatation.  Please correlate with laboratory values.  US of Kidneys on 9/14/2020 - No evidence of hydronephrosis.  Continued pneumobilia left hepatic lobe, similar compared to previous.    Labs on Admission:  COVID-19 Rapid negative  Lactic Acid - 3.9, 1.1, 1.2  ESR 34/.6  ProCal 12.07 and 10.67  Lipase normal    Cultures:  Blood x 2 on 9/12 - 1/4 positive for Klebsiella pneumonia - sensitive  Blood culture x 2 on 9/15 - NGTD  Urine Culture on 9/15 pending    Consulted GI - Discussed with Dr. Vines on 9/15.  Thinks clinically that cholangitis most likely cause of bacteremia.  Recommended transfer to [facility] for ERCP, but Dr. Ratliff with advanced endoscopy declined transfer as patient " "declined ERCP in past and he does not think this is Cholangitis and does not think ERCP is warranted.    Seen by ID - "Pseudomonas septicemia - etiology is unclear to me but GI/ sources are the most likely  - given history of bacteremia, GI seems more likely  - agree with ERCP  - continue abx (could de-escalate to cefepime but would avoid quinolones in this elderly patient, if possible)  - will probably need 2 weeks of parenteral therapy  - d/w patient and granddaughter".    Plan:  Continue Zosyn - day #5/14 of antibiotics  PT/OT consult    "

## 2020-09-17 NOTE — PT/OT/SLP EVAL
Physical Therapy Evaluation    Patient Name:  Erin Sahu   MRN:  [id number]    Recommendations:     Discharge Recommendations:  nursing facility, skilled   Discharge Equipment Recommendations: (TBD at next level of care)   Barriers to discharge: Inaccessible home and Decreased caregiver support    Assessment:     Erin Sahu is a 89 y.o. female admitted with a medical diagnosis of Sepsis due to Pseudomonas species.  She presents with the following impairments/functional limitations:  weakness, impaired endurance, impaired sensation, impaired self care skills, impaired functional mobilty, gait instability, impaired balance, decreased upper extremity function, decreased lower extremity function, decreased ROM, impaired skin, impaired cardiopulmonary response to activity.    Patient evaluated by PT and goals established. Patient seen with OT for co-evaluation. Patient most limited in her independence with mobility by weakness, decreased endurance, and impaired cardiopulm response to activity. Required Tatyana for supine>sit with use of hospital bed features, Tatyana sit<>stand with RW, and Tatyana amb x5 ft with RW. Patient on 2 L NC during session with SpO2 no higher than 96%. PT will continue to follow and progress as tolerated. Rec for dc to SNF.     Rehab Prognosis: Good; patient would benefit from acute skilled PT services to address these deficits and reach maximum level of function.    Recent Surgery: * No surgery found *      Plan:     During this hospitalization, patient to be seen 5 x/week to address the identified rehab impairments via gait training, therapeutic activities, therapeutic exercises and progress toward the following goals:    · Plan of Care Expires:  10/08/20    Subjective     Chief Complaint: reports worried she had bowel movement  Patient/Family Comments/goals: Goal to return to PLOF; Patient agreeable to evaluation.  Pain/Comfort:  · Pain Rating 1: 0/10  · Pain Rating Post-Intervention 1:  0/10    Patients cultural, spiritual, Cheondoism conflicts given the current situation: no    Living Environment:  · Pt lives alone in a single story home with 3 steps to enter and B handrail(s) with a ramp available.   · Pt has a walk in shower with a SC and a toilet with grab bars.   · Upon discharge, patient will have assistance from her family, reports daily visits by family but unclear if available 24/7.  · Pt reports previously has sitters  Prior level of function:  · Ambulation: Mod(I) with RW for household mobility, occasional use of cane (mostly when climbing stairs)  · ADL's: mod(I)-indep  · IADLs: Cooks, has someone that cleans her house.   · Falls: Denies  · Equipment used at home: cane, straight, grab bar, walker, rolling, shower chair.  DME owned (not currently used): bedside commode.     Objective:     Communicated with OLAMIDE Tracey prior to session.  Patient found right sidelying with peripheral IV, oxygen, SCD, bed alarm(2 L NC)  upon PT entry to room.    General Precautions: Standard, diabetic, fall, respiratory   Orthopedic Precautions:N/A   Braces: N/A     Patient donned yellow socks and gait belt for OOB mobility.    Exams:  · Cognition:   · Patient is oriented x4 - unable to recall date but states day of week, month, year.  · Pt follows approximately 100% of one and two step commands.    · Mood: Pleasant and cooperative.  · Safety Awareness: Imapired  · Musculoskeletal:  · BMI: 31.0  · Posture:  Rounded shoulders, forward head  · LE ROM/Strength:   · R ROM: WFL, limited hip flexion AROM  · L ROM: WFL, limited hip lfexion AROM  · R Strength:   · Hip flexion: 2/5  · Knee extension: 5/5  · Dorsiflexion: 4/5   · L Strength:   · Hip flexion: 2/5  · Knee extension: 5/5  · Dorsiflexion: 4/5   · Neuromuscular:  · Sensation: Intact to light touch bilateral LEs.   · Pt reports occasional neuropathy that affects her shins  · Tone/Reflexes: No impairments identified with functional mobility. No formal testing  performed.   · Coordination: Slow but intact  · Balance:   · Static sitting: Normal  · Static standing: Fair, BUE support on RW  · Dynamic standing: Fair- , BUE support on RW and Tatyana  · Visual-vestibular: No impairments identified with functional mobility. No formal testing performed.  · Integument: Bruising of L torso, L anterior shin, arms, L anterior ankle and Thin skin  · Cardiopulmonary:  · O2 Requirements: 2 L NC  · Vital signs:      20 1140 20 1145 20 1150   Vital Signs   Pulse 72 78 74   SpO2 95 %  --   --    Flow (L/min) 2  --   --    BP (!) 143/70 133/73 133/67   BP Location Left arm  --   --    Patient Position Lying Sitting Sitting  (after ambulation)     · Edema: Slight pitting edema BLE      Functional Mobility:  · Bed Mobility:     · Rolling Left:  contact guard assistance  · Rolling Right: contact guard assistance  · Supine to Sit: minimum assistance  · Transfers:     · Sit to Stand:  minimum assistance with rolling walker  · Limited eccentric control of descent into chair  · Gait: x5 ft with RW and Tatyana.   · Maintains RW anterior to COG, cues for proper management  · Limited foot clearance and stride length (B), no overt LOB    Therapeutic Activities and Exercises:  ·  Gait and transfers as above  PT educated patient re:   PT plan of care/role of PT  Safety with OOB mobility  Use of RW, hospital bed features   Pt verbalized understanding       AM-PAC 6 CLICK MOBILITY  Total Score:16     Patient left up in chair with all lines intact, call button in reach and OT Ladan present.    GOALS:   Multidisciplinary Problems     Physical Therapy Goals        Problem: Physical Therapy Goal    Goal Priority Disciplines Outcome Goal Variances Interventions   Physical Therapy Goal     PT, PT/OT Ongoing, Progressing     Description: Goals to be met by: 10/8/20    Patient will increase functional independence with mobility by performin. Sit<>stand with SBA with RW.  2. Gait x 100 feet with  SBA with RW.  3. Supine<>sit without use of hospital bed features with supervision.                    History:     Past Medical History:   Diagnosis Date    Anxiety     Arthritis     Coronary artery disease     Diabetes mellitus type II     HHD (hypertensive heart disease)     Hyperlipidemia     Hypertension     Mitral valve disease     Pacemaker     Syncope and collapse        Past Surgical History:   Procedure Laterality Date    BACK SURGERY      BREAST SURGERY      CARDIAC PACEMAKER PLACEMENT  01/23/2009    CHOLECYSTECTOMY      CORONARY ANGIOPLASTY WITH STENT PLACEMENT  03/2001    EYE SURGERY Bilateral 06/2001    cataracts    HEMORRHOID SURGERY      HYSTERECTOMY      REPLACEMENT OF PACEMAKER GENERATOR N/A 5/1/2019    Procedure: REPLACEMENT, PACEMAKER GENERATOR;  Surgeon: Ezio Mclean MD;  [facility];  Service: Cardiology;  Laterality: N/A;    ROTATOR CUFF REPAIR Right     TOTAL KNEE ARTHROPLASTY Right        Time Tracking:     PT Received On: 09/17/20  PT Start Time: 1135     PT Stop Time: 1204  PT Total Time (min): 29 min     Overlap with OT for portions of session due to complex nature of patient and for safety with mobility to decrease fall risk for patient and caregiver injury requiring two skilled therapists to provide interventions.     Billable Minutes: Evaluation 20      Jordana Fuentes, PT  09/17/2020

## 2020-09-17 NOTE — ASSESSMENT & PLAN NOTE
In sinus rhythm on admission.  Afib with RVR with HR in 130-140s in AM of 9/15/2020.  IV Metoprolol 5mg x 1 given.  In NSR this morning  -Continue Carvedilol and Apixaban.

## 2020-09-17 NOTE — PROGRESS NOTES
Gastroenterology Progress Note    09/17/2020      Patient Active Problem List   Diagnosis    Dyslipidemia    Obesity    Type 2 diabetes mellitus with diabetic neuropathy, without long-term current use of insulin    Anxiety disorder    Essential hypertension    Hypoglycemia    Dehydration    Nausea, vomiting and diarrhea    SSS (sick sinus syndrome)    Pacemaker at end of battery life    Paroxysmal atrial fibrillation    Acute cholangitis    Transaminitis    DINO (acute kidney injury)    Elevated LFTs    Pseudomonas septicemia    Discharge planning issues    Debility    Sepsis    Hyponatremia       Chief Complaint: bacteremia    Subjective:   Ms. Erin Sahu states she is doing well. No abdominal pain, nausea, vomiting. No fevers or chills.  She is A&Ox3.    Review of Systems:  Denies chest pain or shortness of breath  Denies fevers or chills     Objective:  Vitals:    09/17/20 0400 09/17/20 0436 09/17/20 0600 09/17/20 0733   BP:  126/60  132/67   BP Location:  Left arm  Left arm   Patient Position:  Lying  Lying   Pulse: (!) 58 71 70 86   Resp:  17  16   Temp:  97.5 °F (36.4 °C)  97.9 °F (36.6 °C)   TempSrc:  Axillary  Oral   SpO2:  96%  95%   Weight:       Height:            Physical Exam:  GEN: NAD, pleasant, non-toxic appearing  HEENT: NCAT, EOMI, anicteric sclera, pink conjunctiva, moist mucous membranes  CV: regular rate and rhythm, no murmurs  Pulm: CTAB, no wheeze/rhonchi/rales  ABD: soft, non-tender, non-distended, normal bowel sounds  Skin: normal color and temperature  Neuro: A&Ox3, answers questions appropriately, no gross focal deficits  Psych: appropriate mood and affect     Scheduled Meds:   apixaban  5 mg Oral BID    aspirin  81 mg Oral Daily    carvediloL  12.5 mg Oral BID    NIFEdipine  30 mg Oral Daily    pantoprazole  40 mg Oral Daily    piperacillin-tazobactam (ZOSYN) IVPB  4.5 g Intravenous Q8H    pravastatin  20 mg Oral Nightly     Continuous Infusions:   lactated  ringers 75 mL/hr at 09/16/20 1500     PRN Meds:.acetaminophen, dextrose 50%, dextrose 50%, glucagon (human recombinant), glucose, glucose, haloperidol lactate, influenza, insulin aspart U-100, LORazepam, ondansetron, sodium chloride 0.9%     Pertinent labs and imaging for the last 24 hours personally reviewed.    Results for JOHN MENJIVAR (MRN [id number]) as of 9/17/2020 07:55   Ref. Range 9/16/2020 10:14   Specimen UA Unknown Urine, Catheterized   Color, UA Latest Ref Range: Yellow, Straw, Charmaine  Yellow   Appearance, UA Latest Ref Range: Clear  Clear   Specific [place], UA Latest Ref Range: 1.005 - 1.030  1.015   pH, UA Latest Ref Range: 5.0 - 8.0  7.0   Protein, UA Latest Ref Range: Negative  Negative   Glucose, UA Latest Ref Range: Negative  Negative   Ketones, UA Latest Ref Range: Negative  Trace (A)   Occult Blood UA Latest Ref Range: Negative  1+ (A)   NITRITE UA Latest Ref Range: Negative  Negative   UROBILINOGEN UA Latest Ref Range: <2.0 EU/dL Negative   Bilirubin (UA) Latest Ref Range: Negative  Negative   Leukocytes, UA Latest Ref Range: Negative  Negative   RBC, UA Latest Ref Range: 0 - 4 /hpf 3   WBC, UA Latest Ref Range: 0 - 5 /hpf 1   Squam Epithel, UA Latest Units: /hpf 1   Hyaline Casts, UA Latest Ref Range: 0-1/lpf /lpf 0   Microscopic Comment Unknown SEE COMMENT        Assessment:   Ms. John Menjivar is a 89 y.o. woman with a history of CAD, DM2, HTN, HLD, s/p pacemaker, MV disease who presented as transfer for fevers, chills, hypotension and was found to have Pseudomonas septicemia. She was hospitalized in June for bacteremia. At that time LFTs were elevated in cholestatic pattern and EUS/ERCP was recommended due to suspected cholangitis however patient refused procedure.  This admission, TBili is 1.2, alk phos was previously elevated but now normal.  US this admission shows extrahepatic duct 9mm (was 1.2 in June 202), which can be normal in the setting of prior cholecystectomy.  Findings this  admission are not as consistent with ascending cholangitis as prior findings but cannot be ruled out.     Recommendations:  - Pt cannot have MRCP due to pacemaker. Transfer for EUS/ERCP declined.  - Continue antibiotics  - Follow up with GI outpatient and consider EUS/ERCP if patient is amenable to endoscopy.     Thank you for involving me in the care of Ms. Erin Sahu.  GI will sign off. Please don't hesitate to call if there are additional questions or concerns.     Rene Parson MD

## 2020-09-17 NOTE — SUBJECTIVE & OBJECTIVE
Interval History:  No acute events reported overnight.  She is awake and alert.  Oriented to person, place, month/year, president x 2, serial 7s.      Review of Systems   Constitutional: Positive for fatigue. Negative for chills and fever.   HENT: Negative for ear pain.    Eyes: Negative for pain.   Respiratory: Negative for shortness of breath and wheezing.    Cardiovascular: Negative for chest pain.   Gastrointestinal: Negative for abdominal distention, abdominal pain, constipation, diarrhea, nausea and vomiting.   Genitourinary: Negative for dysuria and frequency.   Musculoskeletal: Negative for arthralgias and myalgias.   Neurological: Negative for light-headedness.   Psychiatric/Behavioral: Negative for agitation and confusion.     Objective:     Vital Signs (Most Recent):  Temp: 97.9 °F (36.6 °C) (09/17/20 0733)  Pulse: 86 (09/17/20 0733)  Resp: 16 (09/17/20 0733)  BP: 132/67 (09/17/20 0733)  SpO2: 95 % (09/17/20 0733) Vital Signs (24h Range):  Temp:  [94.6 °F (34.8 °C)-98.8 °F (37.1 °C)] 97.9 °F (36.6 °C)  Pulse:  [58-86] 86  Resp:  [16-20] 16  SpO2:  [93 %-97 %] 95 %  BP: (114-151)/(58-72) 132/67     Weight: 84.4 kg (186 lb 1.1 oz)  Body mass index is 30.96 kg/m².    Intake/Output Summary (Last 24 hours) at 9/17/2020 1040  Last data filed at 9/17/2020 0600  Gross per 24 hour   Intake 375 ml   Output 810 ml   Net -435 ml      Physical Exam  Constitutional:       General: She is not in acute distress.     Appearance: She is well-developed. She is obese.   HENT:      Head: Atraumatic.   Eyes:      Conjunctiva/sclera: Conjunctivae normal.   Neck:      Musculoskeletal: Neck supple.   Cardiovascular:      Rate and Rhythm: Normal rate and regular rhythm.      Heart sounds: Murmur present.   Pulmonary:      Effort: Pulmonary effort is normal.      Breath sounds: No wheezing.      Comments: Distant lung sounds  Abdominal:      General: Bowel sounds are normal. There is no distension.      Palpations: Abdomen is soft.       Tenderness: There is no abdominal tenderness.   Musculoskeletal: Normal range of motion.         General: No deformity.   Neurological:      Mental Status: She is alert and oriented to person, place, and time.      Comments: Diffusely weak         Significant Labs: All pertinent labs within the past 24 hours have been reviewed.    Significant Imaging: I have reviewed all pertinent imaging results/findings within the past 24 hours.

## 2020-09-17 NOTE — PROGRESS NOTES
"[facility] Hospital Medicine  Progress Note    Patient Name: Erin Sahu  MRN: [id number]  Patient Class: IP- Inpatient   Admission Date: 9/13/2020  Length of Stay: 4 days  Attending Physician: Darius White MD  Primary Care Provider: Junior Godinez MD        Subjective:     Principal Problem:Sepsis due to Pseudomonas species        HPI:  Per Vikas Goode, NP:    "The patient is a 89 y.o. female who has a past medical history of coronary artery disease, diabetes mellitus type II, HHD (hypertensive heart disease), hyperlipidemia, hypertension, and syncope presented to [place] with fever/chills and hypotension.  Patient states she started feeling weak and lethargic this morning, so her grand daughter called EMS.  On presentation to outside facility, patient was febrile and hypotensive requiring pressor support.  Her lactic was elevated above 3, so broad spectrum antibiotics initiated.  She was transferred to [facility] for continued intensive care."    Overview/Hospital Course:  Patient is a 89-year-old woman with hypertension, diabetes mellitus type 2, dyslipidemia, coronary artery disease who presented to [facility] with new onset of fevers and chills and hypotensive require vasopressors.  Patient with elevated white blood cell count and mild lactic acidosis.  Patient started empiric broad-spectrum antibiotics for possible sepsis.  No source of infection were identified.  Vasopressors weaned off on arrival.  Clinically improving on empiric antibiotics.      Interval History:  No acute events reported overnight.  She is awake and alert.  Oriented to person, place, month/year, president x 2, serial 7s.      Review of Systems   Constitutional: Positive for fatigue. Negative for chills and fever.   HENT: Negative for ear pain.    Eyes: Negative for pain.   Respiratory: Negative for shortness of breath and wheezing.    Cardiovascular: Negative for chest pain.   Gastrointestinal: " Negative for abdominal distention, abdominal pain, constipation, diarrhea, nausea and vomiting.   Genitourinary: Negative for dysuria and frequency.   Musculoskeletal: Negative for arthralgias and myalgias.   Neurological: Negative for light-headedness.   Psychiatric/Behavioral: Negative for agitation and confusion.     Objective:     Vital Signs (Most Recent):  Temp: 97.9 °F (36.6 °C) (09/17/20 0733)  Pulse: 86 (09/17/20 0733)  Resp: 16 (09/17/20 0733)  BP: 132/67 (09/17/20 0733)  SpO2: 95 % (09/17/20 0733) Vital Signs (24h Range):  Temp:  [94.6 °F (34.8 °C)-98.8 °F (37.1 °C)] 97.9 °F (36.6 °C)  Pulse:  [58-86] 86  Resp:  [16-20] 16  SpO2:  [93 %-97 %] 95 %  BP: (114-151)/(58-72) 132/67     Weight: 84.4 kg (186 lb 1.1 oz)  Body mass index is 30.96 kg/m².    Intake/Output Summary (Last 24 hours) at 9/17/2020 1040  Last data filed at 9/17/2020 0600  Gross per 24 hour   Intake 375 ml   Output 810 ml   Net -435 ml      Physical Exam  Constitutional:       General: She is not in acute distress.     Appearance: She is well-developed. She is obese.   HENT:      Head: Atraumatic.   Eyes:      Conjunctiva/sclera: Conjunctivae normal.   Neck:      Musculoskeletal: Neck supple.   Cardiovascular:      Rate and Rhythm: Normal rate and regular rhythm.      Heart sounds: Murmur present.   Pulmonary:      Effort: Pulmonary effort is normal.      Breath sounds: No wheezing.      Comments: Distant lung sounds  Abdominal:      General: Bowel sounds are normal. There is no distension.      Palpations: Abdomen is soft.      Tenderness: There is no abdominal tenderness.   Musculoskeletal: Normal range of motion.         General: No deformity.   Neurological:      Mental Status: She is alert and oriented to person, place, and time.      Comments: Diffusely weak         Significant Labs: All pertinent labs within the past 24 hours have been reviewed.    Significant Imaging: I have reviewed all pertinent imaging results/findings within the  "past 24 hours.      Assessment/Plan:      * Sepsis due to Pseudomonas species  Patient presented with fever, tachypnea, with normal white blood cell count however with a left shift, hypotension, and mild lactic acidosis.  Patient started empiric broad-spectrum antibiotics.  No clear source of infection identified - ?GI.   Improved with normal BP and HR today.    Patient admitted to [facility] from 6/24 - 6/29 for Sepsis - "She was started on IV Vanc/Zosyn along with IV fluids. CT abdomen/pelvis and US abdomen was unremarkable. Hepatology was consulted given elevated LFTs and believe presentation is correlated with possibility of passed sludge/stone with possible cholangitis. Blood cx + GNRs. Switched Zosyn to Rocephin as E coli and Proteus sensitive to rocephin. Vitals, labs, and symptoms continued to improve.  Hepatology recommending EUS for possible ERCP, which patient declined given age, improvement in symptoms, and labs. Patient received a midline and will be discharged home with IV Cefriaxone for a total 14 day treatment for GNR bacteremia (estimated end date: 7/8/2020)".      CXR on Admission - New left retrocardiac atelectasis or consolidation.  US of Abd on 9/14/2020 - Extrahepatic common duct measures 9 mm, mildly increased in size compared to June 2020. Mild intrahepatic bile duct dilatation.  Please correlate with laboratory values.  US of Kidneys on 9/14/2020 - No evidence of hydronephrosis.  Continued pneumobilia left hepatic lobe, similar compared to previous.    Labs on Admission:  COVID-19 Rapid negative  Lactic Acid - 3.9, 1.1, 1.2  ESR 34/.6  ProCal 12.07 and 10.67  Lipase normal    Cultures:  Blood x 2 on 9/12 - 1/4 positive for Klebsiella pneumonia - sensitive  Blood culture x 2 on 9/15 - NGTD  Urine Culture on 9/15 pending    Consulted GI - Discussed with Dr. Vines on 9/15.  Thinks clinically that cholangitis most likely cause of bacteremia.  Recommended transfer to [facility] for " "ERCP, but Dr. Ratliff with advanced endoscopy declined transfer as patient declined ERCP in past and he does not think this is Cholangitis and does not think ERCP is warranted.    Seen by ID - "Pseudomonas septicemia - etiology is unclear to me but GI/ sources are the most likely  - given history of bacteremia, GI seems more likely  - agree with ERCP  - continue abx (could de-escalate to cefepime but would avoid quinolones in this elderly patient, if possible)  - will probably need 2 weeks of parenteral therapy  - d/w patient and granddaughter".    Plan:  Continue Zosyn - day #5/14 of antibiotics  PT/OT consult      Hyponatremia  Na 129 on admission and 133 yesterday.  Labs pending today  Will consider further work-up if needed        Debility  Consult physical and occupational therapy.      DINO (acute kidney injury)  Creatinine 1.6 on admission and improved to 0.8 after IV fluids  Monitor closely      Paroxysmal atrial fibrillation  In sinus rhythm on admission.  Afib with RVR with HR in 130-140s in AM of 9/15/2020.  IV Metoprolol 5mg x 1 given.  In NSR this morning  -Continue Carvedilol and Apixaban.        Essential hypertension  Hypotensive on admission - resolved.  Home Meds:  Carvedilol 12.5mg bid, Irbesartan 150mg qday, Lasix 20mg QOD, Clonidine 0.1mg prn  Hospital Meds:  Carvedilol 12.5mg bid and Nifedipine XR 30mg daili  -Continue and monitor    Type 2 diabetes mellitus with diabetic neuropathy, without long-term current use of insulin  Home Meds:  Metformin 500mg qHAS  A1C was 6.7 on admission  -Continue with sliding scale insulin now.    Dyslipidemia  Continue statin therapy.      VTE Risk Mitigation (From admission, onward)         Ordered     apixaban tablet 5 mg  2 times daily      09/14/20 0825     Reason for No Pharmacological VTE Prophylaxis  Once     Question:  Reasons:  Answer:  Already adequately anticoagulated on oral Anticoagulants    09/13/20 0008     Place HOLDEN hose  Until discontinued      " 09/13/20 0008     IP VTE HIGH RISK PATIENT  Once      09/13/20 0008     Place sequential compression device  Until discontinued      09/13/20 0008                Discharge Planning   LAITH:      Code Status: Full Code   Is the patient medically ready for discharge?:     Reason for patient still in hospital (select all that apply): Patient trending condition and Treatment  Discharge Plan A: Home Health                  Darius White MD  Department of Hospital Medicine   [facility]

## 2020-09-17 NOTE — PLAN OF CARE
Plan of Care reviewed with patient and questions answered. Pt up with assist in room to chair. Tolerates well. Pt AAO x 4. Resp. Even and unlabored. Bruising noted to BUE and BLE. Dsg to Rt arm skin tear CDI.  Pt free from falls or injury. Pain controlled with pain medications as ordered. F/C patent and draining leah urine to g/u bag. Purposeful rounding performed. Safety maintained. Bed in lowest position and locked. Call light in reach. No acute distress noted. VSS.          Shanda Painter RN

## 2020-09-17 NOTE — ASSESSMENT & PLAN NOTE
Na 129 on admission and 133 yesterday.  Labs pending today  Will consider further work-up if needed

## 2020-09-18 PROBLEM — E87.6 HYPOKALEMIA: Status: ACTIVE | Noted: 2020-09-18

## 2020-09-18 PROBLEM — R19.7 DIARRHEA: Status: ACTIVE | Noted: 2020-09-18

## 2020-09-18 PROBLEM — E83.42 HYPOMAGNESEMIA: Status: ACTIVE | Noted: 2020-09-18

## 2020-09-18 LAB
ANION GAP SERPL CALC-SCNC: 11 MMOL/L (ref 8–16)
BACTERIA BLD CULT: NORMAL
BASOPHILS # BLD AUTO: 0.02 K/UL (ref 0–0.2)
BASOPHILS NFR BLD: 0.3 % (ref 0–1.9)
BUN SERPL-MCNC: 8 MG/DL (ref 10–30)
CALCIUM SERPL-MCNC: 8.2 MG/DL (ref 8.7–10.5)
CHLORIDE SERPL-SCNC: 98 MMOL/L (ref 95–110)
CO2 SERPL-SCNC: 25 MMOL/L (ref 23–29)
CREAT SERPL-MCNC: 0.7 MG/DL (ref 0.5–1.4)
DIFFERENTIAL METHOD: ABNORMAL
EOSINOPHIL # BLD AUTO: 0.4 K/UL (ref 0–0.5)
EOSINOPHIL NFR BLD: 7.2 % (ref 0–8)
ERYTHROCYTE [DISTWIDTH] IN BLOOD BY AUTOMATED COUNT: 12.4 % (ref 11.5–14.5)
EST. GFR  (AFRICAN AMERICAN): >60 ML/MIN/1.73 M^2
EST. GFR  (NON AFRICAN AMERICAN): >60 ML/MIN/1.73 M^2
GLUCOSE SERPL-MCNC: 164 MG/DL (ref 70–110)
HCT VFR BLD AUTO: 32.9 % (ref 37–48.5)
HGB BLD-MCNC: 11.6 G/DL (ref 12–16)
IMM GRANULOCYTES # BLD AUTO: 0.15 K/UL (ref 0–0.04)
IMM GRANULOCYTES NFR BLD AUTO: 2.5 % (ref 0–0.5)
LYMPHOCYTES # BLD AUTO: 1.6 K/UL (ref 1–4.8)
LYMPHOCYTES NFR BLD: 26.3 % (ref 18–48)
MAGNESIUM SERPL-MCNC: 1.5 MG/DL (ref 1.6–2.6)
MCH RBC QN AUTO: 32.1 PG (ref 27–31)
MCHC RBC AUTO-ENTMCNC: 35.3 G/DL (ref 32–36)
MCV RBC AUTO: 91 FL (ref 82–98)
MONOCYTES # BLD AUTO: 0.6 K/UL (ref 0.3–1)
MONOCYTES NFR BLD: 9.5 % (ref 4–15)
NEUTROPHILS # BLD AUTO: 3.3 K/UL (ref 1.8–7.7)
NEUTROPHILS NFR BLD: 54.2 % (ref 38–73)
NRBC BLD-RTO: 0 /100 WBC
PHOSPHATE SERPL-MCNC: 2.4 MG/DL (ref 2.7–4.5)
PLATELET # BLD AUTO: 160 K/UL (ref 150–350)
PMV BLD AUTO: 9 FL (ref 9.2–12.9)
POCT GLUCOSE: 161 MG/DL (ref 70–110)
POCT GLUCOSE: 188 MG/DL (ref 70–110)
POCT GLUCOSE: 188 MG/DL (ref 70–110)
POCT GLUCOSE: 238 MG/DL (ref 70–110)
POTASSIUM SERPL-SCNC: 3.3 MMOL/L (ref 3.5–5.1)
RBC # BLD AUTO: 3.61 M/UL (ref 4–5.4)
SODIUM SERPL-SCNC: 134 MMOL/L (ref 136–145)
WBC # BLD AUTO: 6 K/UL (ref 3.9–12.7)

## 2020-09-18 PROCEDURE — 97535 SELF CARE MNGMENT TRAINING: CPT

## 2020-09-18 PROCEDURE — 87493 C DIFF AMPLIFIED PROBE: CPT

## 2020-09-18 PROCEDURE — 25000003 PHARM REV CODE 250: Performed by: INTERNAL MEDICINE

## 2020-09-18 PROCEDURE — 63600175 PHARM REV CODE 636 W HCPCS: Performed by: INTERNAL MEDICINE

## 2020-09-18 PROCEDURE — 94761 N-INVAS EAR/PLS OXIMETRY MLT: CPT

## 2020-09-18 PROCEDURE — 36415 COLL VENOUS BLD VENIPUNCTURE: CPT

## 2020-09-18 PROCEDURE — 97116 GAIT TRAINING THERAPY: CPT

## 2020-09-18 PROCEDURE — 27000221 HC OXYGEN, UP TO 24 HOURS

## 2020-09-18 PROCEDURE — 83735 ASSAY OF MAGNESIUM: CPT

## 2020-09-18 PROCEDURE — 80048 BASIC METABOLIC PNL TOTAL CA: CPT

## 2020-09-18 PROCEDURE — 85025 COMPLETE CBC W/AUTO DIFF WBC: CPT

## 2020-09-18 PROCEDURE — 97530 THERAPEUTIC ACTIVITIES: CPT

## 2020-09-18 PROCEDURE — 11000001 HC ACUTE MED/SURG PRIVATE ROOM

## 2020-09-18 PROCEDURE — 87449 NOS EACH ORGANISM AG IA: CPT

## 2020-09-18 PROCEDURE — 84100 ASSAY OF PHOSPHORUS: CPT

## 2020-09-18 PROCEDURE — 87324 CLOSTRIDIUM AG IA: CPT

## 2020-09-18 RX ORDER — POTASSIUM CHLORIDE 1.5 G/1.58G
20 POWDER, FOR SOLUTION ORAL 2 TIMES DAILY
Status: COMPLETED | OUTPATIENT
Start: 2020-09-18 | End: 2020-09-19

## 2020-09-18 RX ORDER — MAGNESIUM SULFATE 1 G/100ML
1 INJECTION INTRAVENOUS ONCE
Status: COMPLETED | OUTPATIENT
Start: 2020-09-18 | End: 2020-09-18

## 2020-09-18 RX ADMIN — SODIUM CHLORIDE, SODIUM LACTATE, POTASSIUM CHLORIDE, AND CALCIUM CHLORIDE: .6; .31; .03; .02 INJECTION, SOLUTION INTRAVENOUS at 09:09

## 2020-09-18 RX ADMIN — PANTOPRAZOLE SODIUM 40 MG: 40 TABLET, DELAYED RELEASE ORAL at 09:09

## 2020-09-18 RX ADMIN — ACETAMINOPHEN 650 MG: 325 TABLET, FILM COATED ORAL at 05:09

## 2020-09-18 RX ADMIN — MAGNESIUM OXIDE TAB 400 MG (241.3 MG ELEMENTAL MG) 400 MG: 400 (241.3 MG) TAB at 09:09

## 2020-09-18 RX ADMIN — POTASSIUM CHLORIDE 20 MEQ: 1.5 POWDER, FOR SOLUTION ORAL at 09:09

## 2020-09-18 RX ADMIN — MAGNESIUM SULFATE 1 G: 1 INJECTION INTRAVENOUS at 01:09

## 2020-09-18 RX ADMIN — MAGNESIUM OXIDE TAB 400 MG (241.3 MG ELEMENTAL MG) 400 MG: 400 (241.3 MG) TAB at 10:09

## 2020-09-18 RX ADMIN — PIPERACILLIN SODIUM AND TAZOBACTAM SODIUM 4.5 G: 4; .5 INJECTION, POWDER, LYOPHILIZED, FOR SOLUTION INTRAVENOUS at 02:09

## 2020-09-18 RX ADMIN — ACETAMINOPHEN 650 MG: 325 TABLET, FILM COATED ORAL at 10:09

## 2020-09-18 RX ADMIN — NIFEDIPINE 30 MG: 30 TABLET, FILM COATED, EXTENDED RELEASE ORAL at 09:09

## 2020-09-18 RX ADMIN — PRAVASTATIN SODIUM 20 MG: 20 TABLET ORAL at 10:09

## 2020-09-18 RX ADMIN — APIXABAN 5 MG: 2.5 TABLET, FILM COATED ORAL at 09:09

## 2020-09-18 RX ADMIN — ACETAMINOPHEN 650 MG: 325 TABLET, FILM COATED ORAL at 09:09

## 2020-09-18 RX ADMIN — ASPIRIN 81 MG: 81 TABLET, COATED ORAL at 09:09

## 2020-09-18 RX ADMIN — PIPERACILLIN SODIUM AND TAZOBACTAM SODIUM 4.5 G: 4; .5 INJECTION, POWDER, LYOPHILIZED, FOR SOLUTION INTRAVENOUS at 09:09

## 2020-09-18 RX ADMIN — INSULIN ASPART 2 UNITS: 100 INJECTION, SOLUTION INTRAVENOUS; SUBCUTANEOUS at 10:09

## 2020-09-18 RX ADMIN — CARVEDILOL 12.5 MG: 12.5 TABLET, FILM COATED ORAL at 10:09

## 2020-09-18 RX ADMIN — CARVEDILOL 12.5 MG: 12.5 TABLET, FILM COATED ORAL at 09:09

## 2020-09-18 RX ADMIN — PIPERACILLIN SODIUM AND TAZOBACTAM SODIUM 4.5 G: 4; .5 INJECTION, POWDER, LYOPHILIZED, FOR SOLUTION INTRAVENOUS at 05:09

## 2020-09-18 NOTE — ASSESSMENT & PLAN NOTE
"Patient presented with fever, tachypnea, with normal white blood cell count however with a left shift, hypotension, and mild lactic acidosis.  Patient started empiric broad-spectrum antibiotics.  No clear source of infection identified - ?GI.   Improved with normal BP and HR today.    Patient admitted to [facility] from 6/24 - 6/29 for Sepsis - "She was started on IV Vanc/Zosyn along with IV fluids. CT abdomen/pelvis and US abdomen was unremarkable. Hepatology was consulted given elevated LFTs and believe presentation is correlated with possibility of passed sludge/stone with possible cholangitis. Blood cx + GNRs. Switched Zosyn to Rocephin as E coli and Proteus sensitive to rocephin. Vitals, labs, and symptoms continued to improve.  Hepatology recommending EUS for possible ERCP, which patient declined given age, improvement in symptoms, and labs. Patient received a midline and will be discharged home with IV Cefriaxone for a total 14 day treatment for GNR bacteremia (estimated end date: 7/8/2020)".      CXR on Admission - New left retrocardiac atelectasis or consolidation.  US of Abd on 9/14/2020 - Extrahepatic common duct measures 9 mm, mildly increased in size compared to June 2020. Mild intrahepatic bile duct dilatation.  Please correlate with laboratory values.  US of Kidneys on 9/14/2020 - No evidence of hydronephrosis.  Continued pneumobilia left hepatic lobe, similar compared to previous.    Labs on Admission:  COVID-19 Rapid negative  Lactic Acid - 3.9, 1.1, 1.2  ESR 34/.6  ProCal 12.07 and 10.67  Lipase normal    Cultures:  Blood x 2 on 9/12 - 1/4 positive for Klebsiella pneumonia - sensitive  Blood culture x 2 on 9/15 - NGTD  Urine Culture on 9/15 negative    Consulted GI - Discussed with Dr. Vines on 9/15.  Thinks clinically that cholangitis most likely cause of bacteremia.  Recommended transfer to [facility] for ERCP, but Dr. Ratliff with advanced endoscopy declined transfer as patient " "declined ERCP in past and he does not think this is Cholangitis and does not think ERCP is warranted.    Seen by ID - "Pseudomonas septicemia - etiology is unclear to me but GI/ sources are the most likely  - given history of bacteremia, GI seems more likely  - agree with ERCP  - continue abx (could de-escalate to cefepime but would avoid quinolones in this elderly patient, if possible)  - will probably need 2 weeks of parenteral therapy  - d/w patient and granddaughter".    Plan:  Continue Zosyn - day #7/14 of antibiotics  PT/OT consult recommends SNF - spoke with granddaughter and patient and both agree with plan.  Awaiting placement.  GI follow up on discharge    "

## 2020-09-18 NOTE — PLAN OF CARE
09/18/20 1132   Post-Acute Status   Post-Acute Authorization Placement   Post-Acute Placement Status Referrals Sent

## 2020-09-18 NOTE — ASSESSMENT & PLAN NOTE
Hypotensive on admission - resolved.  Home Meds:  Carvedilol 12.5mg bid, Irbesartan 150mg qday, Lasix 20mg QOD, Clonidine 0.1mg prn  Hospital Meds:  Carvedilol 12.5mg bid and Nifedipine XR 30mg daily   -Continue and monitor

## 2020-09-18 NOTE — SUBJECTIVE & OBJECTIVE
Interval History:  No acute events reported overnight.  She is awake and alert.  No further diarrhea per patient.      Review of Systems   Constitutional: Positive for fatigue. Negative for chills and fever.   HENT: Negative for ear pain.    Eyes: Negative for pain.   Respiratory: Negative for shortness of breath and wheezing.    Cardiovascular: Negative for chest pain.   Gastrointestinal: Negative for abdominal distention, abdominal pain, constipation, diarrhea, nausea and vomiting.   Genitourinary: Negative for dysuria and frequency.   Musculoskeletal: Negative for arthralgias and myalgias.   Neurological: Negative for light-headedness.   Psychiatric/Behavioral: Negative for agitation and confusion.     Objective:     Vital Signs (Most Recent):  Temp: 96.3 °F (35.7 °C) (09/18/20 0732)  Pulse: 76 (09/18/20 1000)  Resp: 18 (09/18/20 0732)  BP: (!) 140/65 (09/18/20 0732)  SpO2: (!) 94 % (09/18/20 0732) Vital Signs (24h Range):  Temp:  [96.3 °F (35.7 °C)-97.8 °F (36.6 °C)] 96.3 °F (35.7 °C)  Pulse:  [59-77] 76  Resp:  [16-20] 18  SpO2:  [93 %-96 %] 94 %  BP: (129-162)/(61-74) 140/65     Weight: 84.4 kg (186 lb 1.1 oz)  Body mass index is 30.96 kg/m².    Intake/Output Summary (Last 24 hours) at 9/18/2020 1202  Last data filed at 9/18/2020 1100  Gross per 24 hour   Intake 480 ml   Output 3450 ml   Net -2970 ml      Physical Exam  Constitutional:       General: She is not in acute distress.     Appearance: She is well-developed. She is obese.   HENT:      Head: Atraumatic.   Eyes:      Conjunctiva/sclera: Conjunctivae normal.   Neck:      Musculoskeletal: Neck supple.   Cardiovascular:      Rate and Rhythm: Normal rate and regular rhythm.      Heart sounds: Murmur present.   Pulmonary:      Effort: Pulmonary effort is normal.      Breath sounds: No wheezing.      Comments: Distant lung sounds  Abdominal:      General: Bowel sounds are normal. There is no distension.      Palpations: Abdomen is soft.      Tenderness:  There is no abdominal tenderness.   Musculoskeletal: Normal range of motion.         General: No deformity.   Neurological:      Mental Status: She is alert and oriented to person, place, and time.      Comments: Diffusely weak         Significant Labs: All pertinent labs within the past 24 hours have been reviewed.    Significant Imaging: I have reviewed all pertinent imaging results/findings within the past 24 hours.

## 2020-09-18 NOTE — ASSESSMENT & PLAN NOTE
Started in evening on 9/17/2020 - 3 episodes and watery.  No abdominal pain or dysuria.  Most likely antibiotic-associated.  No history of C.Diff in past.  CDiff positive  -Start po Vanc 125mg q6 (had only itching with IV Vanc in past).  -Continue special contact precautions  -start probiotics

## 2020-09-18 NOTE — PLAN OF CARE
Problem: Occupational Therapy Goal  Goal: Occupational Therapy Goal  Description: Goals to be met by: 9/26/20    Patient will increase functional independence with ADLs by performing:    UB dressing at SBA.  LB dressing at SBA.  Grooming standing at the sink at SBA.  Toileting at SBA.  Toilet/BSC transfers at SBA.  Sponge bathing at SBA.    Outcome: Ongoing, Progressing   Pt making steady progress towards goals, POC remains appropriate.  Pt with improved indep during all tasks, however still requiring assist for all ADLS & mobility. Pt would benefit from continued OT services with SNF to maximize indep during all daily tasks.

## 2020-09-18 NOTE — PLAN OF CARE
Problem: Occupational Therapy Goal  Goal: Occupational Therapy Goal  Description: Goals to be met by: 9/26/20    Patient will increase functional independence with ADLs by performing:    UB dressing at SBA.  LB dressing at SBA.  Grooming standing at the sink at SBA.  Toileting at SBA.  Toilet/BSC transfers at SBA.  Sponge bathing at SBA.    Outcome: Ongoing, Progressing   Evaluation complete and goals established.  Recommend discharge to SNF at this time.

## 2020-09-18 NOTE — PLAN OF CARE
Pt AAO x 4. Resp. Even and unlabored. Dsg to Rt arm CDI. Bruising noted to BUE and BLE. Denies any c/o discomfort. Granddaughter @ bedside. F/C patent and draining yellow urine to g/u bag. Pt free from falls or injury. Purposeful rounding done. Safety maintained. Call light in reach.

## 2020-09-18 NOTE — PT/OT/SLP EVAL
Occupational Therapy   Evaluation    Name: Erin Sahu  MRN: [id number]  Admitting Diagnosis:  Sepsis due to Pseudomonas species      Recommendations:     Discharge Recommendations: nursing facility, skilled  Discharge Equipment Recommendations:  (TBD at next level of care.)  Barriers to discharge:  Decreased caregiver support    Assessment:     Erin Sahu is a 89 y.o. female with a medical diagnosis of Sepsis due to Pseudomonas species.  She presents agreeable to OT evaluation and VSS throughout OT evaluation and tx session.   Performance deficits affecting function: weakness, impaired functional mobilty, impaired endurance, impaired self care skills, gait instability, impaired sensation, impaired balance, decreased upper extremity function, decreased lower extremity function, decreased coordination, decreased ROM, impaired skin, impaired fine motor, impaired cardiopulmonary response to activity.      Rehab Prognosis: Fair; patient would benefit from acute skilled OT services to address these deficits and reach maximum level of function.       Plan:     Patient to be seen 5 x/week to address the above listed problems via self-care/home management, therapeutic activities, therapeutic exercises  · Plan of Care Expires: 10/17/20  · Plan of Care Reviewed with: patient    Subjective     Chief Complaint: Pt wanting assistance with getting clean due to thinking she had a BM in the bed  Patient/Family Comments/goals: Return to Indep level with self care and return home    Occupational Profile:  Living Environment: Lives alone in a single story home with 3 steps, bilateral handrails, ramp, walk-in shower with shower chair, grab bars around toilet  Previous level of function: Mod I with basic self care tasks and mobility in home with RW, has assistance from [place] [facility] for housekeeping and grocery shopping, uses SPC when going up/down stairs, pt denies falls  Equipment Used at Home:  cane, straight, grab bar, walker,  rolling, shower chair  Assistance upon Discharge: Family able to assist but unsure how much assist pt will have from family    Pain/Comfort:  Pain Rating 1: 0/10(Pt denied pain throughout OT evaluation and tx session.)  Pain Rating Post-Intervention 1: 0/10    Patients cultural, spiritual, Christian conflicts given the current situation: (None stated.)    Objective:     Communicated with: nurseKyung prior to session.  Patient found HOB elevated with bed alarm, peripheral IV, SCD, pressure relief boots, 2L oxygen via NC upon OT entry to room.    General Precautions: Standard, diabetic, fall, pacemaker, respiratory   Orthopedic Precautions:N/A   Braces: N/A     Occupational Performance:    COGNITION:     Level of arousal: Alert    Orientation: Grossly intact    Memory: TBA    Problem Solving: Fair for self care tasks    Judgement/Safety Awareness: Impaired    Attention: Good    Follows Commands: 100%    ADL PERFORMANCE:     Feeding: Set up; needs assist to open packages due to impaired coordination and decreased strength   Grooming: Min A   UE Dressing: TBA   LE Dressing: Max A   UE Bathing: TBA   LE Bathing: TBA   Toileting: Nino catheter; Pt needing Max/Total A for hygiene after BM (increased assist due to lying in bed when pt had BM)    FUNCTIONAL MOBILITY:      Bed Rolling: SBA<>CGA, pt needing cues for technique and use of bed rails    Supine to Sit: Min A   Sit to Supine: Mod A   Sit to Stand: Min A with RW   Chair<>Bed Transfer: Min A with RW and assist to manage lines and RW   BSC Transfer: NT   Toilet Transfer: NT    UE FUNCTION:    Extremity ROM Strength   Right Arthritic changes in hand with decreased ; elbow flex/ext is WFL for self care tasks; shoulder 0-90 degrees Shoulder 3+/5 through available AROM,  Elbow flex/ext 4-/5;  3+/5 through available ROM   Left Arthritic changes in hand with decreased ; elbow flex/ext is WFL for self care tasks; shoulder 0-80 degrees  Shoulder 3+/5 through available AROM, Elbow flex/ext  4-/5;   3+/5 through available ROM       Sensation: Light touch grossly intact; Pt reports she has neuropathy in anterior lower legs  Coordination:  Gross motor is WFL for self care though slow movement; Fine motor is impaired with significant arthritic changes in hands (pt denied pain in hands)  Tone: Normal for age  Edema: Mild LEs  Bruising of left UE, trunk and left LE    BALANCE:     Static Sitting: SBA sitting EOB   Dynamic Sitting CGA sitting in chair   Static Standing: Min A with RW   Dynamic Standing Min A with RW with min challenge    VISION:    Acuity: Functional for self care and able to count fingers from 5 feet away   Field: NT   Neglect: None   Tracking: Intact      AMPA 6 Click ADL:  AMPAC Total Score: 14    Treatment & Education:  Role of OT, POC, safety with ADLs and ADL mobility, use of call button  Education:    Patient left HOB elevated with all lines intact, call button in reach, nurse, Kyung green, OLAMIDE Bravo present and SCDs and pressure relief boots with waffle mattress inflated.    GOALS:   Multidisciplinary Problems     Occupational Therapy Goals        Problem: Occupational Therapy Goal    Goal Priority Disciplines Outcome Interventions   Occupational Therapy Goal     OT, PT/OT Ongoing, Progressing    Description: Goals to be met by: 9/26/20    Patient will increase functional independence with ADLs by performing:    UB dressing at SBA.  LB dressing at SBA.  Grooming standing at the sink at SBA.  Toileting at SBA.  Toilet/BSC transfers at SBA.  Sponge bathing at SBA.                     History:     Past Medical History:   Diagnosis Date    Anxiety     Arthritis     Coronary artery disease     Diabetes mellitus type II     HHD (hypertensive heart disease)     Hyperlipidemia     Hypertension     Mitral valve disease     Pacemaker     Syncope and collapse          Past Surgical History:   Procedure Laterality Date     BACK SURGERY      BREAST SURGERY      CARDIAC PACEMAKER PLACEMENT  01/23/2009    CHOLECYSTECTOMY      CORONARY ANGIOPLASTY WITH STENT PLACEMENT  03/2001    EYE SURGERY Bilateral 06/2001    cataracts    HEMORRHOID SURGERY      HYSTERECTOMY      REPLACEMENT OF PACEMAKER GENERATOR N/A 5/1/2019    Procedure: REPLACEMENT, PACEMAKER GENERATOR;  Surgeon: Ezio Mclean MD;  [facility];  Service: Cardiology;  Laterality: N/A;    ROTATOR CUFF REPAIR Right     TOTAL KNEE ARTHROPLASTY Right        Time Tracking:     OT Date of Treatment: 09/17/20  OT Start Time: 1140  OT Stop Time: 1235  OT Total Time (min): 55 min     Overlap with PT for portions of session due to complex nature of pt and need for increased safety.  Two skilled therapists needed during functional mobility to decrease patient fall risk and decrease risk of caregiver injury.      Billable Minutes:Evaluation 35  Self Care/Home Management 15    SERGE Myrick  9/17/2020

## 2020-09-18 NOTE — PLAN OF CARE
Problem: Physical Therapy Goal  Goal: Physical Therapy Goal  Description: Goals to be met by: 10/8/20    Patient will increase functional independence with mobility by performin. Sit<>stand with SBA with RW.  2. Gait x 100 feet with SBA with RW.  3. Supine<>sit without use of hospital bed features with supervision.   Outcome: Ongoing, Progressing     Patient with fairly good tolerance to therapeutic activity as noted by no increase in pain and ability to partake in activities with less manual assistance needed. Patient with increased gait distance compared to initial evaluation. Patient requires therapist to repeat directions multiple times during session. Co-treated with OT to optimize treatment time with two skilled therapists.   Raina Moy, PT, DPT  2020

## 2020-09-18 NOTE — PLAN OF CARE
Discharge Planning Assessment:  Patient admitted on 9-13-20  Chart reviewed, Care plan discussed with treatment team,  attending Dr White  PCP updated in Epic: yes    Current dispo: SNF next week  SNF packet forwarded today via [facility]  C-diff cultures pending  Case management  to follow       09/18/20 1123   Discharge Reassessment   Assessment Type Discharge Planning Reassessment   Provided patient/caregiver education on the expected discharge date and the discharge plan Yes   Do you have any problems affording any of your prescribed medications? No   Discharge Plan A Skilled Nursing Facility   Discharge Plan B Home Health   DME Needed Upon Discharge  none   Patient choice form signed by patient/caregiver N/A   Anticipated Discharge Disposition SNF   Can the patient/caregiver answer the patient profile reliably? Yes, cognitively intact   How does the patient rate their overall health at the present time? Good   Describe the patient's ability to walk at the present time. Minor restrictions or changes

## 2020-09-18 NOTE — PT/OT/SLP PROGRESS
Physical Therapy Treatment    Patient Name:  Erin Sahu   MRN:  [id number]    Recommendations:     Discharge Recommendations:  nursing facility, skilled   Discharge Equipment Recommendations: (TBD at next level of care)   Barriers to discharge: Decreased caregiver support    Assessment:     Erin Sahu is a 89 y.o. female admitted with a medical diagnosis of Sepsis due to Pseudomonas species.  She presents with the following impairments/functional limitations:  weakness, impaired endurance, impaired functional mobilty, gait instability, impaired balance, decreased safety awareness.These impairments inhibit the patient from independently and safely participating in desired functional tasks such as transfers, ambulating within home, cooking, cleaning, and community ambulation.     Patient with fairly good tolerance to therapeutic activity as noted by no increase in pain and ability to partake in activities with less manual assistance needed. Patient with increased gait distance compared to initial evaluation. Patient requires therapist to repeat directions multiple times during session. Co-treated with OT due to complex nature of patient and for safety with OOB mobility.    Prior to admission pt was modified independent with mobility and self-care and there is expectation of returning to prior level of function to maintain independence avoiding readmission. Pt is at high risk of unplanned readmission due to fall risk and lack of 24 hour caregiver in prior setting.      Rehab Prognosis: Good; patient would benefit from acute skilled PT services to address these deficits and reach maximum level of function.    Recent Surgery: * No surgery found *      Plan:     During this hospitalization, patient to be seen 5 x/week to address the identified rehab impairments via therapeutic activities, therapeutic exercises, neuromuscular re-education, gait training and progress toward the following goals:    · Plan of Care  Expires:  10/08/20    Subjective     Chief Complaint: Patient with no complaints   Patient/Family Comments/goals: Patient requested PT help with bed mobility  Pain/Comfort:  · Pain Rating 1: 0/10  · Pain Rating Post-Intervention 1: 0/10      Objective:     Communicated with OLAMIDE Tracey prior to session.  Patient found supine with R wedge with peripheral IV, SCD, bed alarm, oxygen upon PT entry to room.     General Precautions: Standard, diabetic, fall, respiratory   Orthopedic Precautions:N/A     Functional Mobility:  · Bed Mobility:  Noted use of HB features: HOB elevated and use of HR  · Scooting: moderate assistance at BLE  · Supine to Sit: moderate assistance at BLE  · Verbal cues how to complete task most efficiently  · Increased time required to complete task  · Transfers:     · Sit to Stand:  contact guard assistance with rolling walker  · Verbal cues for hand placement  · Did not follow PT's verbal cues   · Stand to sit: Min A  · Poor eccentric control of quads  · Verbal cues for hand placement   · Gait:   · ambulated ~ 8-10' with RW in closed environment to bedside chair, CGA   · Poor posture: forward head, kyphosis  · Poor gerardo  · Decreased step length  · Verbal cues for safety when turning  · Verbal cues for safety during backwards stepping  · Increased time to complete task  · Balance:  · Standing static balance: CGA with RW  · Poor posture: forward head, forward shoulders, kyphosis  · OT performed hygiene care       AM-PAC 6 CLICK MOBILITY  Turning over in bed (including adjusting bedclothes, sheets and blankets)?: 3  Sitting down on and standing up from a chair with arms (e.g., wheelchair, bedside commode, etc.): 3  Moving from lying on back to sitting on the side of the bed?: 3  Moving to and from a bed to a chair (including a wheelchair)?: 3  Need to walk in hospital room?: 3  Climbing 3-5 steps with a railing?: 2  Basic Mobility Total Score: 17       Therapeutic Activities and Exercises:  PT discussed  importance of participating in ankle pumps, 10-20x per hour    Patient left up in chair with all lines intact, call button in reach, RN notified and grand-daughter present..    GOALS:   Multidisciplinary Problems     Physical Therapy Goals        Problem: Physical Therapy Goal    Goal Priority Disciplines Outcome Goal Variances Interventions   Physical Therapy Goal     PT, PT/OT Ongoing, Progressing     Description: Goals to be met by: 10/8/20    Patient will increase functional independence with mobility by performin. Sit<>stand with SBA with RW.  2. Gait x 100 feet with SBA with RW.  3. Supine<>sit without use of hospital bed features with supervision.                    Time Tracking:     PT Received On: 20  PT Start Time: 919     PT Stop Time: 942  PT Total Time (min): 23 min   Co-treat/time overlap with OT    Billable Minutes: Therapeutic Activity 15 for PT    Treatment Type: Treatment  PT/PTA: PT     PTA Visit Number: 0     Raina Moy, PT, DPT  2020

## 2020-09-19 LAB
ANION GAP SERPL CALC-SCNC: 11 MMOL/L (ref 8–16)
BASOPHILS # BLD AUTO: 0.03 K/UL (ref 0–0.2)
BASOPHILS NFR BLD: 0.5 % (ref 0–1.9)
BUN SERPL-MCNC: 6 MG/DL (ref 10–30)
C DIFF GDH STL QL: POSITIVE
C DIFF TOX A+B STL QL IA: NEGATIVE
C DIFF TOX GENS STL QL NAA+PROBE: POSITIVE
CALCIUM SERPL-MCNC: 8.3 MG/DL (ref 8.7–10.5)
CHLORIDE SERPL-SCNC: 98 MMOL/L (ref 95–110)
CO2 SERPL-SCNC: 26 MMOL/L (ref 23–29)
CREAT SERPL-MCNC: 0.7 MG/DL (ref 0.5–1.4)
DIFFERENTIAL METHOD: ABNORMAL
EOSINOPHIL # BLD AUTO: 0.3 K/UL (ref 0–0.5)
EOSINOPHIL NFR BLD: 5.6 % (ref 0–8)
ERYTHROCYTE [DISTWIDTH] IN BLOOD BY AUTOMATED COUNT: 12.7 % (ref 11.5–14.5)
EST. GFR  (AFRICAN AMERICAN): >60 ML/MIN/1.73 M^2
EST. GFR  (NON AFRICAN AMERICAN): >60 ML/MIN/1.73 M^2
GLUCOSE SERPL-MCNC: 132 MG/DL (ref 70–110)
HCT VFR BLD AUTO: 35.7 % (ref 37–48.5)
HGB BLD-MCNC: 12.5 G/DL (ref 12–16)
IMM GRANULOCYTES # BLD AUTO: 0.11 K/UL (ref 0–0.04)
IMM GRANULOCYTES NFR BLD AUTO: 1.9 % (ref 0–0.5)
LYMPHOCYTES # BLD AUTO: 2 K/UL (ref 1–4.8)
LYMPHOCYTES NFR BLD: 34.1 % (ref 18–48)
MAGNESIUM SERPL-MCNC: 1.7 MG/DL (ref 1.6–2.6)
MCH RBC QN AUTO: 32.8 PG (ref 27–31)
MCHC RBC AUTO-ENTMCNC: 35 G/DL (ref 32–36)
MCV RBC AUTO: 94 FL (ref 82–98)
MONOCYTES # BLD AUTO: 0.6 K/UL (ref 0.3–1)
MONOCYTES NFR BLD: 9.9 % (ref 4–15)
NEUTROPHILS # BLD AUTO: 2.8 K/UL (ref 1.8–7.7)
NEUTROPHILS NFR BLD: 48 % (ref 38–73)
NRBC BLD-RTO: 0 /100 WBC
PHOSPHATE SERPL-MCNC: 2.5 MG/DL (ref 2.7–4.5)
PLATELET # BLD AUTO: 181 K/UL (ref 150–350)
PMV BLD AUTO: 9.2 FL (ref 9.2–12.9)
POCT GLUCOSE: 141 MG/DL (ref 70–110)
POCT GLUCOSE: 182 MG/DL (ref 70–110)
POCT GLUCOSE: 193 MG/DL (ref 70–110)
POCT GLUCOSE: 222 MG/DL (ref 70–110)
POTASSIUM SERPL-SCNC: 3.6 MMOL/L (ref 3.5–5.1)
RBC # BLD AUTO: 3.81 M/UL (ref 4–5.4)
SODIUM SERPL-SCNC: 135 MMOL/L (ref 136–145)
WBC # BLD AUTO: 5.75 K/UL (ref 3.9–12.7)

## 2020-09-19 PROCEDURE — 99233 PR SUBSEQUENT HOSPITAL CARE,LEVL III: ICD-10-PCS | Mod: ,,, | Performed by: INTERNAL MEDICINE

## 2020-09-19 PROCEDURE — 63600175 PHARM REV CODE 636 W HCPCS: Performed by: INTERNAL MEDICINE

## 2020-09-19 PROCEDURE — 97110 THERAPEUTIC EXERCISES: CPT | Mod: CQ

## 2020-09-19 PROCEDURE — 85025 COMPLETE CBC W/AUTO DIFF WBC: CPT

## 2020-09-19 PROCEDURE — 25000003 PHARM REV CODE 250: Performed by: INTERNAL MEDICINE

## 2020-09-19 PROCEDURE — 11000001 HC ACUTE MED/SURG PRIVATE ROOM

## 2020-09-19 PROCEDURE — 97116 GAIT TRAINING THERAPY: CPT | Mod: CQ

## 2020-09-19 PROCEDURE — 84100 ASSAY OF PHOSPHORUS: CPT

## 2020-09-19 PROCEDURE — 99233 SBSQ HOSP IP/OBS HIGH 50: CPT | Mod: ,,, | Performed by: INTERNAL MEDICINE

## 2020-09-19 PROCEDURE — 27000221 HC OXYGEN, UP TO 24 HOURS

## 2020-09-19 PROCEDURE — 36415 COLL VENOUS BLD VENIPUNCTURE: CPT

## 2020-09-19 PROCEDURE — 80048 BASIC METABOLIC PNL TOTAL CA: CPT

## 2020-09-19 PROCEDURE — 94761 N-INVAS EAR/PLS OXIMETRY MLT: CPT

## 2020-09-19 PROCEDURE — 83735 ASSAY OF MAGNESIUM: CPT

## 2020-09-19 RX ORDER — L. ACIDOPHILUS/L.BULGARICUS 100MM CELL
1 GRANULES IN PACKET (EA) ORAL 2 TIMES DAILY
Status: DISCONTINUED | OUTPATIENT
Start: 2020-09-19 | End: 2020-09-22 | Stop reason: HOSPADM

## 2020-09-19 RX ADMIN — CARVEDILOL 12.5 MG: 12.5 TABLET, FILM COATED ORAL at 08:09

## 2020-09-19 RX ADMIN — LACTOBACILLUS ACIDOPHILUS / LACTOBACILLUS BULGARICUS 1 EACH: 100 MILLION CFU STRENGTH GRANULES at 11:09

## 2020-09-19 RX ADMIN — APIXABAN 5 MG: 2.5 TABLET, FILM COATED ORAL at 08:09

## 2020-09-19 RX ADMIN — MAGNESIUM OXIDE TAB 400 MG (241.3 MG ELEMENTAL MG) 400 MG: 400 (241.3 MG) TAB at 08:09

## 2020-09-19 RX ADMIN — ACETAMINOPHEN 650 MG: 325 TABLET, FILM COATED ORAL at 08:09

## 2020-09-19 RX ADMIN — POTASSIUM CHLORIDE 20 MEQ: 1.5 POWDER, FOR SOLUTION ORAL at 08:09

## 2020-09-19 RX ADMIN — ACETAMINOPHEN 650 MG: 325 TABLET, FILM COATED ORAL at 05:09

## 2020-09-19 RX ADMIN — INSULIN ASPART 1 UNITS: 100 INJECTION, SOLUTION INTRAVENOUS; SUBCUTANEOUS at 11:09

## 2020-09-19 RX ADMIN — PRAVASTATIN SODIUM 20 MG: 20 TABLET ORAL at 08:09

## 2020-09-19 RX ADMIN — Medication 125 MG: at 05:09

## 2020-09-19 RX ADMIN — NIFEDIPINE 30 MG: 30 TABLET, FILM COATED, EXTENDED RELEASE ORAL at 08:09

## 2020-09-19 RX ADMIN — Medication 125 MG: at 11:09

## 2020-09-19 RX ADMIN — PIPERACILLIN SODIUM AND TAZOBACTAM SODIUM 4.5 G: 4; .5 INJECTION, POWDER, LYOPHILIZED, FOR SOLUTION INTRAVENOUS at 04:09

## 2020-09-19 RX ADMIN — PIPERACILLIN SODIUM AND TAZOBACTAM SODIUM 4.5 G: 4; .5 INJECTION, POWDER, LYOPHILIZED, FOR SOLUTION INTRAVENOUS at 08:09

## 2020-09-19 RX ADMIN — SODIUM CHLORIDE, SODIUM LACTATE, POTASSIUM CHLORIDE, AND CALCIUM CHLORIDE: .6; .31; .03; .02 INJECTION, SOLUTION INTRAVENOUS at 11:09

## 2020-09-19 RX ADMIN — PIPERACILLIN SODIUM AND TAZOBACTAM SODIUM 4.5 G: 4; .5 INJECTION, POWDER, LYOPHILIZED, FOR SOLUTION INTRAVENOUS at 11:09

## 2020-09-19 RX ADMIN — LACTOBACILLUS ACIDOPHILUS / LACTOBACILLUS BULGARICUS 1 EACH: 100 MILLION CFU STRENGTH GRANULES at 08:09

## 2020-09-19 RX ADMIN — PANTOPRAZOLE SODIUM 40 MG: 40 TABLET, DELAYED RELEASE ORAL at 08:09

## 2020-09-19 RX ADMIN — ASPIRIN 81 MG: 81 TABLET, COATED ORAL at 08:09

## 2020-09-19 NOTE — PLAN OF CARE
Plan of Care reviewed with patient and questions answered. Pt up with assist in room to chair with PT. Tolerates well. AAO x 4. Resp. Even and unlabored.  Pt free from falls or injury. F/C patent and draining yellow urine to g/u bag. Dsg to Rt arm CDI. Pt on contact isolation for c-diff. Purposeful rounding performed. O2@2L/NC. Tolerating well.  Safety maintained. Bed in lowest position and locked. Call light in reach. No acute distress noted. VSS.          Shanda Painter RN

## 2020-09-19 NOTE — PT/OT/SLP PROGRESS
Physical Therapy Treatment    Patient Name:  Erin Sahu   MRN:  [id number]    Recommendations:     Discharge Recommendations:  nursing facility, skilled   Discharge Equipment Recommendations: (TBD at next level of care)   Barriers to discharge: Decreased caregiver support    Assessment:     Erin Sahu is a 89 y.o. female admitted with a medical diagnosis of Sepsis due to Pseudomonas species.  She presents with the following impairments/functional limitations:  weakness, impaired functional mobilty, gait instability, decreased coordination, impaired balance, impaired self care skills, decreased lower extremity function, impaired endurance, impaired cardiopulmonary response to activity .    Supine to sit modA at Les & hips for scooting/lifting. Sit<>stand with RW modA for lifting/controlled lowering. Pt ambulated 10ft in room with RW & Tatyana for RW management & managing O2 & IV lines, chair f/u for safety. Pt with increased difficulty with R LE step length causing increased hip flexion to clear foot fwd. Sit<>stand from bedside chair modA for lifting/cues for scooting fwd/hand placement. Pt performed LAQ, HF & AP x12 in bedside chair with cues for technique.  Chair alarm in place & nursing notified.     Rehab Prognosis: Good; patient would benefit from acute skilled PT services to address these deficits and reach maximum level of function.    Recent Surgery: * No surgery found *      Plan:     During this hospitalization, patient to be seen 5 x/week to address the identified rehab impairments via therapeutic activities, therapeutic exercises, neuromuscular re-education, gait training and progress toward the following goals:    · Plan of Care Expires:  10/08/20    Subjective     Chief Complaint: none stated  Patient/Family Comments/goals: pt states she said in bedside chair for several hours yesterday, agreeable to therapy today.  Pain/Comfort:  · Pain Rating 1: 0/10  · Pain Rating Post-Intervention 1:  0/10      Objective:     Communicated with nurse Tracey prior to session.  Patient found supine with castle catheter, peripheral IV, oxygen upon PT entry to room.     General Precautions: Standard, diabetic, fall, respiratory   Orthopedic Precautions:N/A   Braces:       Functional Mobility:  Bed mobility- Supine to sit modA at Les & hips for scooting/lifting.   Transfers- Sit<>stand with RW modA for lifting/controlled lowering.it<>stand from bedside chair modA for lifting/cues for scooting fwd/hand placement.  Gait-  Pt ambulated 10ft in room with RW & Tatyana for RW management & managing O2 & IV lines, chair f/u for safety. Pt with increased difficulty with R LE step length causing increased hip flexion to clear foot fwd.     AM-PAC 6 CLICK MOBILITY  Turning over in bed (including adjusting bedclothes, sheets and blankets)?: 3  Sitting down on and standing up from a chair with arms (e.g., wheelchair, bedside commode, etc.): 3  Moving from lying on back to sitting on the side of the bed?: 3  Moving to and from a bed to a chair (including a wheelchair)?: 3  Need to walk in hospital room?: 3  Climbing 3-5 steps with a railing?: 1  Basic Mobility Total Score: 16       Therapeutic Activities and Exercises:  Pt performed LAQ, HF & AP x12 in bedside chair with cues for technique.  Chair alarm in place & nursing notified.     Patient left up in chair with all lines intact, call button in reach, chair alarm on and nurse Tracey notified..    GOALS:   Multidisciplinary Problems     Physical Therapy Goals        Problem: Physical Therapy Goal    Goal Priority Disciplines Outcome Goal Variances Interventions   Physical Therapy Goal     PT, PT/OT Ongoing, Progressing     Description: Goals to be met by: 10/8/20    Patient will increase functional independence with mobility by performin. Sit<>stand with SBA with RW.  2. Gait x 100 feet with SBA with RW.  3. Supine<>sit without use of hospital bed features with supervision.                     Time Tracking:     PT Received On: 09/19/20  PT Start Time: 0855     PT Stop Time: 0920  PT Total Time (min): 25 min     Billable Minutes: Gait Training 10 and Therapeutic Exercise 15    Treatment Type: Treatment  PT/PTA: PTA     PTA Visit Number: 1     Jesi Dasilva PTA  09/19/2020

## 2020-09-19 NOTE — PLAN OF CARE
Problem: Physical Therapy Goal  Goal: Physical Therapy Goal  Description: Goals to be met by: 10/8/20    Patient will increase functional independence with mobility by performin. Sit<>stand with SBA with RW.  2. Gait x 100 feet with SBA with RW.  3. Supine<>sit without use of hospital bed features with supervision.   Outcome: Ongoing, Progressing   Supine to sit modA at Les & hips for scooting/lifting. Sit<>stand with RW modA for lifting/controlled lowering. Pt ambulated 10ft in room with RW & Tatyana for RW management & managing O2 & IV lines, chair f/u for safety. Pt with increased difficulty with R LE step length causing increased hip flexion to clear foot fwd. Sit<>stand from bedside chair modA for lifting/cues for scooting fwd/hand placement. Pt performed LAQ, HF & AP x12 in bedside chair with cues for technique.  Chair alarm in place & nursing notified.

## 2020-09-19 NOTE — PROGRESS NOTES
"[facility] Hospital Medicine  Progress Note    Patient Name: Erin Sahu  MRN: [id number]  Patient Class: IP- Inpatient   Admission Date: 9/13/2020  Length of Stay: 6 days  Attending Physician: Darius White MD  Primary Care Provider: Junior Godinez MD        Subjective:     Principal Problem:Sepsis due to Pseudomonas species        HPI:  Per Vikas Goode, NP:    "The patient is a 89 y.o. female who has a past medical history of coronary artery disease, diabetes mellitus type II, HHD (hypertensive heart disease), hyperlipidemia, hypertension, and syncope presented to [place] with fever/chills and hypotension.  Patient states she started feeling weak and lethargic this morning, so her grand daughter called EMS.  On presentation to outside facility, patient was febrile and hypotensive requiring pressor support.  Her lactic was elevated above 3, so broad spectrum antibiotics initiated.  She was transferred to [facility] for continued intensive care."    Overview/Hospital Course:  Patient is a 89-year-old woman with hypertension, diabetes mellitus type 2, dyslipidemia, coronary artery disease who presented to [facility] with new onset of fevers and chills and hypotensive require vasopressors.  Patient with elevated white blood cell count and mild lactic acidosis.  Patient started empiric broad-spectrum antibiotics for possible sepsis.  No source of infection were identified.  Vasopressors weaned off on arrival.  Clinically improving on empiric antibiotics.      Interval History:  No acute events reported overnight.  She is awake and alert.  No further diarrhea per patient.      Review of Systems   Constitutional: Positive for fatigue. Negative for chills and fever.   HENT: Negative for ear pain.    Eyes: Negative for pain.   Respiratory: Negative for shortness of breath and wheezing.    Cardiovascular: Negative for chest pain.   Gastrointestinal: Negative for abdominal distention, " abdominal pain, constipation, diarrhea, nausea and vomiting.   Genitourinary: Negative for dysuria and frequency.   Musculoskeletal: Negative for arthralgias and myalgias.   Neurological: Negative for light-headedness.   Psychiatric/Behavioral: Negative for agitation and confusion.     Objective:     Vital Signs (Most Recent):  Temp: 96.3 °F (35.7 °C) (09/18/20 0732)  Pulse: 76 (09/18/20 1000)  Resp: 18 (09/18/20 0732)  BP: (!) 140/65 (09/18/20 0732)  SpO2: (!) 94 % (09/18/20 0732) Vital Signs (24h Range):  Temp:  [96.3 °F (35.7 °C)-97.8 °F (36.6 °C)] 96.3 °F (35.7 °C)  Pulse:  [59-77] 76  Resp:  [16-20] 18  SpO2:  [93 %-96 %] 94 %  BP: (129-162)/(61-74) 140/65     Weight: 84.4 kg (186 lb 1.1 oz)  Body mass index is 30.96 kg/m².    Intake/Output Summary (Last 24 hours) at 9/18/2020 1202  Last data filed at 9/18/2020 1100  Gross per 24 hour   Intake 480 ml   Output 3450 ml   Net -2970 ml      Physical Exam  Constitutional:       General: She is not in acute distress.     Appearance: She is well-developed. She is obese.   HENT:      Head: Atraumatic.   Eyes:      Conjunctiva/sclera: Conjunctivae normal.   Neck:      Musculoskeletal: Neck supple.   Cardiovascular:      Rate and Rhythm: Normal rate and regular rhythm.      Heart sounds: Murmur present.   Pulmonary:      Effort: Pulmonary effort is normal.      Breath sounds: No wheezing.      Comments: Distant lung sounds  Abdominal:      General: Bowel sounds are normal. There is no distension.      Palpations: Abdomen is soft.      Tenderness: There is no abdominal tenderness.   Musculoskeletal: Normal range of motion.         General: No deformity.   Neurological:      Mental Status: She is alert and oriented to person, place, and time.      Comments: Diffusely weak         Significant Labs: All pertinent labs within the past 24 hours have been reviewed.    Significant Imaging: I have reviewed all pertinent imaging results/findings within the past 24  "hours.      Assessment/Plan:      * Sepsis due to Pseudomonas species  Patient presented with fever, tachypnea, with normal white blood cell count however with a left shift, hypotension, and mild lactic acidosis.  Patient started empiric broad-spectrum antibiotics.  No clear source of infection identified - ?GI.   Improved with normal BP and HR today.    Patient admitted to [facility] from 6/24 - 6/29 for Sepsis - "She was started on IV Vanc/Zosyn along with IV fluids. CT abdomen/pelvis and US abdomen was unremarkable. Hepatology was consulted given elevated LFTs and believe presentation is correlated with possibility of passed sludge/stone with possible cholangitis. Blood cx + GNRs. Switched Zosyn to Rocephin as E coli and Proteus sensitive to rocephin. Vitals, labs, and symptoms continued to improve.  Hepatology recommending EUS for possible ERCP, which patient declined given age, improvement in symptoms, and labs. Patient received a midline and will be discharged home with IV Cefriaxone for a total 14 day treatment for GNR bacteremia (estimated end date: 7/8/2020)".      CXR on Admission - New left retrocardiac atelectasis or consolidation.  US of Abd on 9/14/2020 - Extrahepatic common duct measures 9 mm, mildly increased in size compared to June 2020. Mild intrahepatic bile duct dilatation.  Please correlate with laboratory values.  US of Kidneys on 9/14/2020 - No evidence of hydronephrosis.  Continued pneumobilia left hepatic lobe, similar compared to previous.    Labs on Admission:  COVID-19 Rapid negative  Lactic Acid - 3.9, 1.1, 1.2  ESR 34/.6  ProCal 12.07 and 10.67  Lipase normal    Cultures:  Blood x 2 on 9/12 - 1/4 positive for Klebsiella pneumonia - sensitive  Blood culture x 2 on 9/15 - NGTD  Urine Culture on 9/15 negative    Consulted GI - Discussed with Dr. Vines on 9/15.  Thinks clinically that cholangitis most likely cause of bacteremia.  Recommended transfer to [facility] for ERCP, " "but Dr. Ratliff with advanced endoscopy declined transfer as patient declined ERCP in past and he does not think this is Cholangitis and does not think ERCP is warranted.    Seen by ID - "Pseudomonas septicemia - etiology is unclear to me but GI/ sources are the most likely  - given history of bacteremia, GI seems more likely  - agree with ERCP  - continue abx (could de-escalate to cefepime but would avoid quinolones in this elderly patient, if possible)  - will probably need 2 weeks of parenteral therapy  - d/w patient and granddaughter".    Plan:  Continue Zosyn - day #7/14 of antibiotics  PT/OT consult recommends SNF - spoke with granddaughter and patient and both agree with plan.  Awaiting placement.  GI follow up on discharge      Diarrhea  Started in evening on 9/17/2020 - 3 episodes and watery.  No abdominal pain or dysuria.  Most likely antibiotic-associated.  No history of C.Diff in past.  CDiff positive  -Start po Vanc 125mg q6 (had only itching with IV Vanc in past).  -Continue special contact precautions  -start probiotics      Paroxysmal atrial fibrillation  In sinus rhythm on admission.  Afib with RVR with HR in 130-140s in AM of 9/15/2020.  IV Metoprolol 5mg x 1 given.  In NSR this morning  -Continue Carvedilol and Apixaban.        Type 2 diabetes mellitus with diabetic neuropathy, without long-term current use of insulin  Home Meds:  Metformin 500mg qHAS  A1C was 6.7 on admission  -Continue with sliding scale insulin now.    Essential hypertension  Hypotensive on admission - resolved.  Home Meds:  Carvedilol 12.5mg bid, Irbesartan 150mg qday, Lasix 20mg QOD, Clonidine 0.1mg prn  Hospital Meds:  Carvedilol 12.5mg bid and Nifedipine XR 30mg daily   -Continue and monitor    DINO (acute kidney injury)  Creatinine 1.6 on admission and improved to 0.7 after IV fluids  Monitor closely      Dyslipidemia  Continue statin therapy.    Debility  Consult physical and occupational " therapy.      Hypomagnesemia  Replace as needed      Hypokalemia  Replace as needed      Hyponatremia  Na 129 on admission and 135 today.              VTE Risk Mitigation (From admission, onward)         Ordered     apixaban tablet 5 mg  2 times daily      09/14/20 0825     Reason for No Pharmacological VTE Prophylaxis  Once     Question:  Reasons:  Answer:  Already adequately anticoagulated on oral Anticoagulants    09/13/20 0008     Place HOLDEN hose  Until discontinued      09/13/20 0008     IP VTE HIGH RISK PATIENT  Once      09/13/20 0008     Place sequential compression device  Until discontinued      09/13/20 0008                Discharge Planning   LAITH: 9/21/2020     Code Status: Full Code   Is the patient medically ready for discharge?:     Reason for patient still in hospital (select all that apply): Patient new problem, Patient trending condition and Treatment  Discharge Plan A: Skilled Nursing Facility                  Darius White MD  Department of Hospital Medicine   [facility]

## 2020-09-20 PROBLEM — A04.72 C. DIFFICILE DIARRHEA: Status: ACTIVE | Noted: 2020-09-18

## 2020-09-20 LAB
ANION GAP SERPL CALC-SCNC: 12 MMOL/L (ref 8–16)
BACTERIA BLD CULT: NORMAL
BACTERIA BLD CULT: NORMAL
BASOPHILS # BLD AUTO: 0.04 K/UL (ref 0–0.2)
BASOPHILS NFR BLD: 0.6 % (ref 0–1.9)
BUN SERPL-MCNC: 6 MG/DL (ref 10–30)
CALCIUM SERPL-MCNC: 8.3 MG/DL (ref 8.7–10.5)
CHLORIDE SERPL-SCNC: 100 MMOL/L (ref 95–110)
CO2 SERPL-SCNC: 24 MMOL/L (ref 23–29)
CREAT SERPL-MCNC: 0.7 MG/DL (ref 0.5–1.4)
DIFFERENTIAL METHOD: ABNORMAL
EOSINOPHIL # BLD AUTO: 0.3 K/UL (ref 0–0.5)
EOSINOPHIL NFR BLD: 5 % (ref 0–8)
ERYTHROCYTE [DISTWIDTH] IN BLOOD BY AUTOMATED COUNT: 13 % (ref 11.5–14.5)
EST. GFR  (AFRICAN AMERICAN): >60 ML/MIN/1.73 M^2
EST. GFR  (NON AFRICAN AMERICAN): >60 ML/MIN/1.73 M^2
GLUCOSE SERPL-MCNC: 135 MG/DL (ref 70–110)
HCT VFR BLD AUTO: 36.3 % (ref 37–48.5)
HGB BLD-MCNC: 12.5 G/DL (ref 12–16)
IMM GRANULOCYTES # BLD AUTO: 0.1 K/UL (ref 0–0.04)
IMM GRANULOCYTES NFR BLD AUTO: 1.5 % (ref 0–0.5)
LYMPHOCYTES # BLD AUTO: 1.9 K/UL (ref 1–4.8)
LYMPHOCYTES NFR BLD: 29.2 % (ref 18–48)
MAGNESIUM SERPL-MCNC: 1.6 MG/DL (ref 1.6–2.6)
MCH RBC QN AUTO: 33 PG (ref 27–31)
MCHC RBC AUTO-ENTMCNC: 34.4 G/DL (ref 32–36)
MCV RBC AUTO: 96 FL (ref 82–98)
MONOCYTES # BLD AUTO: 0.7 K/UL (ref 0.3–1)
MONOCYTES NFR BLD: 11.1 % (ref 4–15)
NEUTROPHILS # BLD AUTO: 3.5 K/UL (ref 1.8–7.7)
NEUTROPHILS NFR BLD: 52.6 % (ref 38–73)
NRBC BLD-RTO: 0 /100 WBC
PHOSPHATE SERPL-MCNC: 2.8 MG/DL (ref 2.7–4.5)
PLATELET # BLD AUTO: 207 K/UL (ref 150–350)
PMV BLD AUTO: 8.9 FL (ref 9.2–12.9)
POCT GLUCOSE: 146 MG/DL (ref 70–110)
POCT GLUCOSE: 157 MG/DL (ref 70–110)
POCT GLUCOSE: 183 MG/DL (ref 70–110)
POTASSIUM SERPL-SCNC: 3.8 MMOL/L (ref 3.5–5.1)
RBC # BLD AUTO: 3.79 M/UL (ref 4–5.4)
SODIUM SERPL-SCNC: 136 MMOL/L (ref 136–145)
WBC # BLD AUTO: 6.6 K/UL (ref 3.9–12.7)

## 2020-09-20 PROCEDURE — 99233 PR SUBSEQUENT HOSPITAL CARE,LEVL III: ICD-10-PCS | Mod: ,,, | Performed by: INTERNAL MEDICINE

## 2020-09-20 PROCEDURE — 27000221 HC OXYGEN, UP TO 24 HOURS

## 2020-09-20 PROCEDURE — 25000003 PHARM REV CODE 250: Performed by: INTERNAL MEDICINE

## 2020-09-20 PROCEDURE — 80048 BASIC METABOLIC PNL TOTAL CA: CPT

## 2020-09-20 PROCEDURE — 36415 COLL VENOUS BLD VENIPUNCTURE: CPT

## 2020-09-20 PROCEDURE — 63600175 PHARM REV CODE 636 W HCPCS: Performed by: INTERNAL MEDICINE

## 2020-09-20 PROCEDURE — 85025 COMPLETE CBC W/AUTO DIFF WBC: CPT

## 2020-09-20 PROCEDURE — 83735 ASSAY OF MAGNESIUM: CPT

## 2020-09-20 PROCEDURE — 99233 SBSQ HOSP IP/OBS HIGH 50: CPT | Mod: ,,, | Performed by: INTERNAL MEDICINE

## 2020-09-20 PROCEDURE — 97535 SELF CARE MNGMENT TRAINING: CPT

## 2020-09-20 PROCEDURE — 84100 ASSAY OF PHOSPHORUS: CPT

## 2020-09-20 PROCEDURE — 11000001 HC ACUTE MED/SURG PRIVATE ROOM

## 2020-09-20 PROCEDURE — 94761 N-INVAS EAR/PLS OXIMETRY MLT: CPT

## 2020-09-20 RX ORDER — LANOLIN ALCOHOL/MO/W.PET/CERES
400 CREAM (GRAM) TOPICAL 2 TIMES DAILY
Status: COMPLETED | OUTPATIENT
Start: 2020-09-20 | End: 2020-09-20

## 2020-09-20 RX ADMIN — CARVEDILOL 12.5 MG: 12.5 TABLET, FILM COATED ORAL at 09:09

## 2020-09-20 RX ADMIN — PIPERACILLIN SODIUM AND TAZOBACTAM SODIUM 4.5 G: 4; .5 INJECTION, POWDER, LYOPHILIZED, FOR SOLUTION INTRAVENOUS at 04:09

## 2020-09-20 RX ADMIN — PANTOPRAZOLE SODIUM 40 MG: 40 TABLET, DELAYED RELEASE ORAL at 09:09

## 2020-09-20 RX ADMIN — PIPERACILLIN SODIUM AND TAZOBACTAM SODIUM 4.5 G: 4; .5 INJECTION, POWDER, LYOPHILIZED, FOR SOLUTION INTRAVENOUS at 08:09

## 2020-09-20 RX ADMIN — LACTOBACILLUS ACIDOPHILUS / LACTOBACILLUS BULGARICUS 1 EACH: 100 MILLION CFU STRENGTH GRANULES at 09:09

## 2020-09-20 RX ADMIN — MAGNESIUM OXIDE TAB 400 MG (241.3 MG ELEMENTAL MG) 400 MG: 400 (241.3 MG) TAB at 12:09

## 2020-09-20 RX ADMIN — PRAVASTATIN SODIUM 20 MG: 20 TABLET ORAL at 09:09

## 2020-09-20 RX ADMIN — Medication 125 MG: at 11:09

## 2020-09-20 RX ADMIN — APIXABAN 5 MG: 2.5 TABLET, FILM COATED ORAL at 09:09

## 2020-09-20 RX ADMIN — ACETAMINOPHEN 650 MG: 325 TABLET, FILM COATED ORAL at 03:09

## 2020-09-20 RX ADMIN — MAGNESIUM OXIDE TAB 400 MG (241.3 MG ELEMENTAL MG) 400 MG: 400 (241.3 MG) TAB at 09:09

## 2020-09-20 RX ADMIN — ASPIRIN 81 MG: 81 TABLET, COATED ORAL at 09:09

## 2020-09-20 RX ADMIN — ACETAMINOPHEN 650 MG: 325 TABLET, FILM COATED ORAL at 09:09

## 2020-09-20 RX ADMIN — Medication 125 MG: at 12:09

## 2020-09-20 RX ADMIN — NIFEDIPINE 30 MG: 30 TABLET, FILM COATED, EXTENDED RELEASE ORAL at 09:09

## 2020-09-20 RX ADMIN — Medication 125 MG: at 05:09

## 2020-09-20 RX ADMIN — PIPERACILLIN SODIUM AND TAZOBACTAM SODIUM 4.5 G: 4; .5 INJECTION, POWDER, LYOPHILIZED, FOR SOLUTION INTRAVENOUS at 12:09

## 2020-09-20 NOTE — PROGRESS NOTES
"[facility] Hospital Medicine  Progress Note    Patient Name: Erin Sahu  MRN: [id number]  Patient Class: IP- Inpatient   Admission Date: 9/13/2020  Length of Stay: 7 days  Attending Physician: Darius White MD  Primary Care Provider: Junior Godinez MD        Subjective:     Principal Problem:Sepsis due to Pseudomonas species        HPI:  Per Vikas Goode, NP:    "The patient is a 89 y.o. female who has a past medical history of coronary artery disease, diabetes mellitus type II, HHD (hypertensive heart disease), hyperlipidemia, hypertension, and syncope presented to [facility] with fever/chills and hypotension.  Patient states she started feeling weak and lethargic this morning, so her grand daughter called EMS.  On presentation to outside facility, patient was febrile and hypotensive requiring pressor support.  Her lactic was elevated above 3, so broad spectrum antibiotics initiated.  She was transferred to [facility] for continued intensive care."    Overview/Hospital Course:  Patient is a 89-year-old woman with hypertension, diabetes mellitus type 2, dyslipidemia, coronary artery disease who presented to [facility] with new onset of fevers and chills and hypotensive require vasopressors.  Patient with elevated white blood cell count and mild lactic acidosis.  Patient started empiric broad-spectrum antibiotics for possible sepsis.  No source of infection were identified.  Vasopressors weaned off on arrival.  Clinically improving on empiric antibiotics.      Interval History:  No acute events reported overnight.  She is awake and alert.  No further diarrhea per patient.  No abdominal pain.  Tolerating po Vanc.  Discussed plan of treatment.    Review of Systems   Constitutional: Positive for fatigue. Negative for chills and fever.   HENT: Negative for ear pain.    Eyes: Negative for pain.   Respiratory: Negative for shortness of breath and wheezing.    Cardiovascular: Negative for " chest pain.   Gastrointestinal: Negative for abdominal distention, abdominal pain, constipation, diarrhea, nausea and vomiting.   Genitourinary: Negative for dysuria and frequency.   Musculoskeletal: Negative for arthralgias and myalgias.   Neurological: Negative for light-headedness.   Psychiatric/Behavioral: Negative for agitation and confusion.     Objective:     Vital Signs (Most Recent):  Temp: 98.3 °F (36.8 °C) (09/20/20 0949)  Pulse: 72 (09/20/20 0949)  Resp: 16 (09/20/20 0949)  BP: (!) 149/70 (09/20/20 0949)  SpO2: 97 % (09/20/20 0949) Vital Signs (24h Range):  Temp:  [97.5 °F (36.4 °C)-98.5 °F (36.9 °C)] 98.3 °F (36.8 °C)  Pulse:  [58-72] 72  Resp:  [12-20] 16  SpO2:  [96 %-99 %] 97 %  BP: (127-154)/(60-70) 149/70     Weight: 84.4 kg (186 lb 1.1 oz)  Body mass index is 30.96 kg/m².    Intake/Output Summary (Last 24 hours) at 9/20/2020 1007  Last data filed at 9/20/2020 0400  Gross per 24 hour   Intake 600 ml   Output 2550 ml   Net -1950 ml      Physical Exam  Constitutional:       General: She is not in acute distress.     Appearance: She is well-developed. She is obese.   HENT:      Head: Atraumatic.   Eyes:      Conjunctiva/sclera: Conjunctivae normal.   Neck:      Musculoskeletal: Neck supple.   Cardiovascular:      Rate and Rhythm: Normal rate and regular rhythm.      Heart sounds: Murmur present.   Pulmonary:      Effort: Pulmonary effort is normal.      Breath sounds: No wheezing.      Comments: Distant lung sounds  Abdominal:      General: Bowel sounds are normal. There is no distension.      Palpations: Abdomen is soft.      Tenderness: There is no abdominal tenderness.   Musculoskeletal: Normal range of motion.         General: No deformity.   Neurological:      Mental Status: She is alert and oriented to person, place, and time.      Comments: Diffusely weak         Significant Labs: All pertinent labs within the past 24 hours have been reviewed.    Significant Imaging: I have reviewed all  "pertinent imaging results/findings within the past 24 hours.      Assessment/Plan:      * Sepsis due to Pseudomonas species  Patient presented with fever, tachypnea, with normal white blood cell count however with a left shift, hypotension, and mild lactic acidosis.    Patient started empiric broad-spectrum antibiotics.    No clear source of infection identified - GI vs .   Improved with normal BP and HR today.    Patient admitted to [facility] from 6/24 - 6/29 for Sepsis - "She was started on IV Vanc/Zosyn along with IV fluids. CT abdomen/pelvis and US abdomen was unremarkable. Hepatology was consulted given elevated LFTs and believe presentation is correlated with possibility of passed sludge/stone with possible cholangitis. Blood cx + GNRs. Switched Zosyn to Rocephin as E coli and Proteus sensitive to rocephin. Vitals, labs, and symptoms continued to improve.  Hepatology recommending EUS for possible ERCP, which patient declined given age, improvement in symptoms, and labs. Patient received a midline and will be discharged home with IV Cefriaxone for a total 14 day treatment for GNR bacteremia (estimated end date: 7/8/2020)".      CXR on Admission - New left retrocardiac atelectasis or consolidation.  US of Abd on 9/14/2020 - Extrahepatic common duct measures 9 mm, mildly increased in size compared to June 2020. Mild intrahepatic bile duct dilatation.  Please correlate with laboratory values.  US of Kidneys on 9/14/2020 - No evidence of hydronephrosis.  Continued pneumobilia left hepatic lobe, similar compared to previous.    Labs on Admission:  COVID-19 Rapid negative  Lactic Acid - 3.9, 1.1, 1.2  ESR 34/.6  ProCal 12.07 and 10.67  Lipase normal    Cultures:  Blood x 2 on 9/12 - 1/4 positive for Klebsiella pneumonia - sensitive  Blood culture x 2 on 9/15 - NGTD  Urine Culture on 9/15 negative    Consulted GI - Discussed with Dr. Vines on 9/15.  Thinks clinically that cholangitis most " "likely cause of bacteremia.  Recommended transfer to [facility] for ERCP, but Dr. Ratliff with advanced endoscopy declined transfer as patient declined ERCP in past and he does not think this is Cholangitis and does not think ERCP is warranted.  They discussed together and decision is to empirically treat with IV antibiotics.  Patient declined ERCP last visit as well.    Seen by ID - "Pseudomonas septicemia - etiology is unclear to me but GI/ sources are the most likely  - given history of bacteremia, GI seems more likely  - agree with ERCP  - continue abx (could de-escalate to cefepime but would avoid quinolones in this elderly patient, if possible)  - will probably need 2 weeks of parenteral therapy  - d/w patient and granddaughter".    Plan:  Continue Zosyn - day #8/14 of antibiotics  PT/OT consult recommends SNF - spoke with granddaughter and patient and both agree with plan.  Awaiting placement.  GI follow up on discharge      C. difficile diarrhea  Started in evening on 9/17/2020 - 3 episodes and watery.  No abdominal pain or dysuria.  Most likely antibiotic-associated.  No history of C.Diff in past.  CDiff positive  Diarrhea now resolved  -Start po Vanc 125mg q6 on 9/19/2020 (had only itching with IV Vanc in past).  -Continue special contact precautions  -started probiotics      Paroxysmal atrial fibrillation  In sinus rhythm on admission.  Afib with RVR with HR in 130-140s in AM of 9/15/2020.  IV Metoprolol 5mg x 1 given.  In NSR this morning  -Continue Carvedilol and Apixaban.        Type 2 diabetes mellitus with diabetic neuropathy, without long-term current use of insulin  Home Meds:  Metformin 500mg qHAS  A1C was 6.7 on admission  -Continue with sliding scale insulin now.    Essential hypertension  Hypotensive on admission - resolved.  Home Meds:  Carvedilol 12.5mg bid, Irbesartan 150mg qday, Lasix 20mg QOD, Clonidine 0.1mg prn  Hospital Meds:  Carvedilol 12.5mg bid and Nifedipine XR 30mg daily   -Continue and " monitor    DINO (acute kidney injury)  Creatinine 1.6 on admission and improved to 0.7 after IV fluids  Monitor closely      Dyslipidemia  Continue statin therapy.    Debility  Consult physical and occupational therapy - SNF placement recommended      Hypomagnesemia  Replace as needed      Hypokalemia  Replace as needed      Hyponatremia  Na 129 on admission and 136 today.              VTE Risk Mitigation (From admission, onward)         Ordered     apixaban tablet 5 mg  2 times daily      09/14/20 0825     Reason for No Pharmacological VTE Prophylaxis  Once     Question:  Reasons:  Answer:  Already adequately anticoagulated on oral Anticoagulants    09/13/20 0008     Place HOLDEN hose  Until discontinued      09/13/20 0008     IP VTE HIGH RISK PATIENT  Once      09/13/20 0008     Place sequential compression device  Until discontinued      09/13/20 0008                Discharge Planning   LAITH: 9/21/2020     Code Status: Full Code   Is the patient medically ready for discharge?:     Reason for patient still in hospital (select all that apply): Patient trending condition and Treatment  Discharge Plan A: Skilled Nursing Facility                  Darius White MD  Department of Hospital Medicine   [facility]

## 2020-09-20 NOTE — ASSESSMENT & PLAN NOTE
Hypotensive on admission - resolved.  Home Meds:  Carvedilol 12.5mg bid, Irbesartan 150mg qday, Lasix 20mg QOD, Clonidine 0.1mg prn  Hospital Meds:  Carvedilol 12.5mg bid and Nifedipine XR 30mg daily   -Continue and monitor   severe muscle mass depletion, severe subcutaneous fat loss, <75% intake of nutrition needs >1month

## 2020-09-20 NOTE — ASSESSMENT & PLAN NOTE
"Patient presented with fever, tachypnea, with normal white blood cell count however with a left shift, hypotension, and mild lactic acidosis.    Patient started empiric broad-spectrum antibiotics.    No clear source of infection identified - GI vs .   Improved with normal BP and HR today.    Patient admitted to [facility] from 6/24 - 6/29 for Sepsis - "She was started on IV Vanc/Zosyn along with IV fluids. CT abdomen/pelvis and US abdomen was unremarkable. Hepatology was consulted given elevated LFTs and believe presentation is correlated with possibility of passed sludge/stone with possible cholangitis. Blood cx + GNRs. Switched Zosyn to Rocephin as E coli and Proteus sensitive to rocephin. Vitals, labs, and symptoms continued to improve.  Hepatology recommending EUS for possible ERCP, which patient declined given age, improvement in symptoms, and labs. Patient received a midline and will be discharged home with IV Cefriaxone for a total 14 day treatment for GNR bacteremia (estimated end date: 7/8/2020)".      CXR on Admission - New left retrocardiac atelectasis or consolidation.  US of Abd on 9/14/2020 - Extrahepatic common duct measures 9 mm, mildly increased in size compared to June 2020. Mild intrahepatic bile duct dilatation.  Please correlate with laboratory values.  US of Kidneys on 9/14/2020 - No evidence of hydronephrosis.  Continued pneumobilia left hepatic lobe, similar compared to previous.    Labs on Admission:  COVID-19 Rapid negative  Lactic Acid - 3.9, 1.1, 1.2  ESR 34/.6  ProCal 12.07 and 10.67  Lipase normal    Cultures:  Blood x 2 on 9/12 - 1/4 positive for Klebsiella pneumonia - sensitive  Blood culture x 2 on 9/15 - NGTD  Urine Culture on 9/15 negative    Consulted GI - Discussed with Dr. Vines on 9/15.  Thinks clinically that cholangitis most likely cause of bacteremia.  Recommended transfer to [facility] for ERCP, but Dr. Ratliff with advanced endoscopy declined transfer as " "patient declined ERCP in past and he does not think this is Cholangitis and does not think ERCP is warranted.  They discussed together and decision is to empirically treat with IV antibiotics.  Patient declined ERCP last visit as well.    Seen by ID - "Pseudomonas septicemia - etiology is unclear to me but GI/ sources are the most likely  - given history of bacteremia, GI seems more likely  - agree with ERCP  - continue abx (could de-escalate to cefepime but would avoid quinolones in this elderly patient, if possible)  - will probably need 2 weeks of parenteral therapy  - d/w patient and granddaughter".    Plan:  Continue Zosyn - day #8/14 of antibiotics  PT/OT consult recommends SNF - spoke with granddaughter and patient and both agree with plan.  Awaiting placement.  GI follow up on discharge    "

## 2020-09-20 NOTE — PLAN OF CARE
Pt AAOx4. Afebrile and VSS throughout shift. Poc reviewed with pt and questions answered. Pt repositioned several times this shift. Pt receiving 2L O2 via N/C with sat's @ 100%. BS and cardiac monitoring maintained. Nino cath in place and down to gravity with leah drainage noted. Dressing to R. Arm and elbow, CDI. Pt denies  pain or discomfort this shift. B/L heels dressing in place and elevated with pillows. Remains free from falls, injury, and skin breakdown. All needs and concerns met. Purposeful rounding done. Contact precautions maintained. Bed is locked and in lowest position, side rails up x2, call light in reach. Pt is resting comfortably at present. Will continue to monitor.

## 2020-09-20 NOTE — ASSESSMENT & PLAN NOTE
Started in evening on 9/17/2020 - 3 episodes and watery.  No abdominal pain or dysuria.  Most likely antibiotic-associated.  No history of C.Diff in past.  CDiff positive  Diarrhea now resolved  -Start po Vanc 125mg q6 on 9/19/2020 (had only itching with IV Vanc in past).  -Continue special contact precautions  -started probiotics

## 2020-09-20 NOTE — PLAN OF CARE
Remains free from fall, injury, and skin breakdown. Voiding via f/c with ayush care performed... VSS on O2 @ 2l via nc. Turned q2h turning tolerated. Pain well controlled with PO. Tele maintained; all alarms active and audible. SCDs in place. Up with pt today. Plan of care reviewed with patient and all questions answered. Bed low, locked w/ bed alarm on. Call light within reach. Purposeful rounding performed. Resting comfortably in bed, no other complaints at this time.

## 2020-09-20 NOTE — SUBJECTIVE & OBJECTIVE
Interval History:  No acute events reported overnight.  She is awake and alert.  No further diarrhea per patient.  No abdominal pain.  Tolerating po Vanc.  Discussed plan of treatment.    Review of Systems   Constitutional: Positive for fatigue. Negative for chills and fever.   HENT: Negative for ear pain.    Eyes: Negative for pain.   Respiratory: Negative for shortness of breath and wheezing.    Cardiovascular: Negative for chest pain.   Gastrointestinal: Negative for abdominal distention, abdominal pain, constipation, diarrhea, nausea and vomiting.   Genitourinary: Negative for dysuria and frequency.   Musculoskeletal: Negative for arthralgias and myalgias.   Neurological: Negative for light-headedness.   Psychiatric/Behavioral: Negative for agitation and confusion.     Objective:     Vital Signs (Most Recent):  Temp: 98.3 °F (36.8 °C) (09/20/20 0949)  Pulse: 72 (09/20/20 0949)  Resp: 16 (09/20/20 0949)  BP: (!) 149/70 (09/20/20 0949)  SpO2: 97 % (09/20/20 0949) Vital Signs (24h Range):  Temp:  [97.5 °F (36.4 °C)-98.5 °F (36.9 °C)] 98.3 °F (36.8 °C)  Pulse:  [58-72] 72  Resp:  [12-20] 16  SpO2:  [96 %-99 %] 97 %  BP: (127-154)/(60-70) 149/70     Weight: 84.4 kg (186 lb 1.1 oz)  Body mass index is 30.96 kg/m².    Intake/Output Summary (Last 24 hours) at 9/20/2020 1007  Last data filed at 9/20/2020 0400  Gross per 24 hour   Intake 600 ml   Output 2550 ml   Net -1950 ml      Physical Exam  Constitutional:       General: She is not in acute distress.     Appearance: She is well-developed. She is obese.   HENT:      Head: Atraumatic.   Eyes:      Conjunctiva/sclera: Conjunctivae normal.   Neck:      Musculoskeletal: Neck supple.   Cardiovascular:      Rate and Rhythm: Normal rate and regular rhythm.      Heart sounds: Murmur present.   Pulmonary:      Effort: Pulmonary effort is normal.      Breath sounds: No wheezing.      Comments: Distant lung sounds  Abdominal:      General: Bowel sounds are normal. There is no  distension.      Palpations: Abdomen is soft.      Tenderness: There is no abdominal tenderness.   Musculoskeletal: Normal range of motion.         General: No deformity.   Neurological:      Mental Status: She is alert and oriented to person, place, and time.      Comments: Diffusely weak         Significant Labs: All pertinent labs within the past 24 hours have been reviewed.    Significant Imaging: I have reviewed all pertinent imaging results/findings within the past 24 hours.

## 2020-09-20 NOTE — PLAN OF CARE
Problem: Occupational Therapy Goal  Goal: Occupational Therapy Goal  Description: Goals to be met by: 9/26/20    Patient will increase functional independence with ADLs by performing:    UB dressing at SBA.  LB dressing at SBA.  Grooming standing at the sink at SBA.  Toileting at SBA.  Toilet/BSC transfers at SBA.  Sponge bathing at SBA.    Outcome: Ongoing, Progressing   The patient was glad to have the opportunity to move and participate in activity.  She completed bed mobility with Mod A and sat EOB for 30 minutes SPV Assist while completed UBD Max A, G/H Min A and UB ROM exercises.  Faina Holliday, Oscar, LOTR 9/20/2020

## 2020-09-20 NOTE — PT/OT/SLP PROGRESS
Occupational Therapy   Treatment    Name: Erin Sahu  MRN: [id number]  Admitting Diagnosis:  Sepsis due to Pseudomonas species       Recommendations:     Discharge Recommendations: nursing facility, skilled  Discharge Equipment Recommendations:  (defer to transfer facility)  Barriers to discharge:  Decreased caregiver support    Assessment:     Erin Sahu is a 89 y.o. female with a medical diagnosis of Sepsis due to Pseudomonas species.  She presents with fatigue.. Performance deficits affecting function are weakness, impaired endurance, impaired self care skills, impaired functional mobilty, gait instability, impaired balance, decreased upper extremity function, decreased lower extremity function, decreased safety awareness, decreased ROM, impaired fine motor, edema.  She completed bed mobility with Mod A and sat EOB for 30 minutes SPV Assist while completed UBD Max A, G/H Min A and UB ROM exercises.  Continuation of OT treatment is needed to maximize patient function while in the hospital.    Rehab Prognosis:  Good; patient would benefit from acute skilled OT services to address these deficits and reach maximum level of function.       Plan:     Patient to be seen 5 x/week to address the above listed problems via self-care/home management, therapeutic activities, therapeutic exercises  · Plan of Care Expires: 10/17/20  · Plan of Care Reviewed with: patient    Subjective     Pain/Comfort:  · Pain Rating 1: 0/10  · Pain Rating Post-Intervention 1: 0/10    Objective:     Communicated with: patient and her nurse prior to session.  Patient found left sidelying with castle catheter, peripheral IV, telemetry upon OT entry to room.  She was agreeable to OT treatment.    General Precautions: Standard, diabetic, fall, respiratory   Orthopedic Precautions:N/A   Braces: N/A     Occupational Performance:     Bed Mobility:    · Min A roll to Right'  · Mod A supine>sit EOB  · Min A sit>supine     Functional  Mobility/Transfers:  · None    Activities of Daily Living:  · Min A G/H (brush teeth, comb hair, wash face and hands) sitting EOB  · Max A UBD ([facility]) sitting EOB    [facility] 6 Click ADL: 14    Treatment & Education:  UE Exercise: UE ROM exercises with HHA and end range holding (10 reps all planes with BUE)    Patient left right sidelying with all lines intact, call button in reach, bed alarm on and nurse notifiedEducation:      GOALS:   Multidisciplinary Problems     Occupational Therapy Goals        Problem: Occupational Therapy Goal    Goal Priority Disciplines Outcome Interventions   Occupational Therapy Goal     OT, PT/OT Ongoing, Progressing    Description: Goals to be met by: 9/26/20    Patient will increase functional independence with ADLs by performing:    UB dressing at SBA.  LB dressing at SBA.  Grooming standing at the sink at SBA.  Toileting at SBA.  Toilet/BSC transfers at SBA.  Sponge bathing at SBA.                     Time Tracking:     OT Date of Treatment: 09/20/20  OT Start Time: 1611  OT Stop Time: 1656  OT Total Time (min): 45 min    Billable Minutes:Self Care/Home Management 45    Faina Holliday, ScD, LOTR  9/20/2020

## 2020-09-21 LAB
POCT GLUCOSE: 190 MG/DL (ref 70–110)
POCT GLUCOSE: 206 MG/DL (ref 70–110)

## 2020-09-21 PROCEDURE — 97535 SELF CARE MNGMENT TRAINING: CPT

## 2020-09-21 PROCEDURE — 11000001 HC ACUTE MED/SURG PRIVATE ROOM

## 2020-09-21 PROCEDURE — 97116 GAIT TRAINING THERAPY: CPT

## 2020-09-21 PROCEDURE — 25000003 PHARM REV CODE 250: Performed by: INTERNAL MEDICINE

## 2020-09-21 PROCEDURE — 94761 N-INVAS EAR/PLS OXIMETRY MLT: CPT

## 2020-09-21 PROCEDURE — 63600175 PHARM REV CODE 636 W HCPCS: Performed by: INTERNAL MEDICINE

## 2020-09-21 PROCEDURE — 97530 THERAPEUTIC ACTIVITIES: CPT

## 2020-09-21 PROCEDURE — 27000221 HC OXYGEN, UP TO 24 HOURS

## 2020-09-21 PROCEDURE — 97110 THERAPEUTIC EXERCISES: CPT

## 2020-09-21 PROCEDURE — 30200315 PPD INTRADERMAL TEST REV CODE 302: Performed by: INTERNAL MEDICINE

## 2020-09-21 PROCEDURE — 86580 TB INTRADERMAL TEST: CPT | Performed by: INTERNAL MEDICINE

## 2020-09-21 RX ORDER — L. ACIDOPHILUS/L.BULGARICUS 100MM CELL
1 GRANULES IN PACKET (EA) ORAL 2 TIMES DAILY
Qty: 60 PACKET | Refills: 0
Start: 2020-09-21

## 2020-09-21 RX ORDER — NIFEDIPINE 30 MG/1
30 TABLET, EXTENDED RELEASE ORAL DAILY
Qty: 30 TABLET | Refills: 1
Start: 2020-09-22 | End: 2021-09-22

## 2020-09-21 RX ADMIN — PIPERACILLIN SODIUM AND TAZOBACTAM SODIUM 4.5 G: 4; .5 INJECTION, POWDER, LYOPHILIZED, FOR SOLUTION INTRAVENOUS at 12:09

## 2020-09-21 RX ADMIN — Medication 125 MG: at 05:09

## 2020-09-21 RX ADMIN — ACETAMINOPHEN 650 MG: 325 TABLET, FILM COATED ORAL at 05:09

## 2020-09-21 RX ADMIN — PANTOPRAZOLE SODIUM 40 MG: 40 TABLET, DELAYED RELEASE ORAL at 09:09

## 2020-09-21 RX ADMIN — INSULIN ASPART 1 UNITS: 100 INJECTION, SOLUTION INTRAVENOUS; SUBCUTANEOUS at 08:09

## 2020-09-21 RX ADMIN — PIPERACILLIN SODIUM AND TAZOBACTAM SODIUM 4.5 G: 4; .5 INJECTION, POWDER, LYOPHILIZED, FOR SOLUTION INTRAVENOUS at 07:09

## 2020-09-21 RX ADMIN — APIXABAN 5 MG: 2.5 TABLET, FILM COATED ORAL at 09:09

## 2020-09-21 RX ADMIN — TUBERCULIN PURIFIED PROTEIN DERIVATIVE 5 UNITS: 5 INJECTION, SOLUTION INTRADERMAL at 05:09

## 2020-09-21 RX ADMIN — Medication 125 MG: at 12:09

## 2020-09-21 RX ADMIN — CARVEDILOL 12.5 MG: 12.5 TABLET, FILM COATED ORAL at 09:09

## 2020-09-21 RX ADMIN — ACETAMINOPHEN 650 MG: 325 TABLET, FILM COATED ORAL at 09:09

## 2020-09-21 RX ADMIN — PIPERACILLIN SODIUM AND TAZOBACTAM SODIUM 4.5 G: 4; .5 INJECTION, POWDER, LYOPHILIZED, FOR SOLUTION INTRAVENOUS at 04:09

## 2020-09-21 RX ADMIN — NIFEDIPINE 30 MG: 30 TABLET, FILM COATED, EXTENDED RELEASE ORAL at 09:09

## 2020-09-21 RX ADMIN — Medication 125 MG: at 11:09

## 2020-09-21 RX ADMIN — SODIUM CHLORIDE, SODIUM LACTATE, POTASSIUM CHLORIDE, AND CALCIUM CHLORIDE: .6; .31; .03; .02 INJECTION, SOLUTION INTRAVENOUS at 11:09

## 2020-09-21 RX ADMIN — SODIUM CHLORIDE, SODIUM LACTATE, POTASSIUM CHLORIDE, AND CALCIUM CHLORIDE: .6; .31; .03; .02 INJECTION, SOLUTION INTRAVENOUS at 09:09

## 2020-09-21 RX ADMIN — ASPIRIN 81 MG: 81 TABLET, COATED ORAL at 09:09

## 2020-09-21 RX ADMIN — LACTOBACILLUS ACIDOPHILUS / LACTOBACILLUS BULGARICUS 1 EACH: 100 MILLION CFU STRENGTH GRANULES at 09:09

## 2020-09-21 RX ADMIN — PRAVASTATIN SODIUM 20 MG: 20 TABLET ORAL at 09:09

## 2020-09-21 NOTE — SUBJECTIVE & OBJECTIVE
Interval History:  Overall stable.  No acute events reported overnight.  She is awake and alert.  No further diarrhea per patient.  No abdominal pain.  Tolerating po Vanc.  Discussed plan of treatment.    Review of Systems   Constitutional: Positive for fatigue. Negative for chills and fever.   HENT: Negative for ear pain.    Eyes: Negative for pain.   Respiratory: Negative for shortness of breath and wheezing.    Cardiovascular: Negative for chest pain.   Gastrointestinal: Negative for abdominal distention, abdominal pain, constipation, diarrhea, nausea and vomiting.   Genitourinary: Negative for dysuria and frequency.   Musculoskeletal: Negative for arthralgias and myalgias.   Neurological: Negative for light-headedness.   Psychiatric/Behavioral: Negative for agitation and confusion.     Objective:     Vital Signs (Most Recent):  Temp: 97.8 °F (36.6 °C) (09/21/20 0711)  Pulse: 60 (09/21/20 0711)  Resp: 16 (09/21/20 0711)  BP: (!) 150/70 (09/21/20 0711)  SpO2: (!) 93 % (09/21/20 0711) Vital Signs (24h Range):  Temp:  [97.4 °F (36.3 °C)-98.3 °F (36.8 °C)] 97.8 °F (36.6 °C)  Pulse:  [58-77] 60  Resp:  [16-17] 16  SpO2:  [93 %-98 %] 93 %  BP: (134-157)/(64-76) 150/70     Weight: 84.4 kg (186 lb 1.1 oz)  Body mass index is 30.96 kg/m².    Intake/Output Summary (Last 24 hours) at 9/21/2020 0854  Last data filed at 9/21/2020 0600  Gross per 24 hour   Intake 2929 ml   Output 2550 ml   Net 379 ml      Physical Exam  Constitutional:       General: She is not in acute distress.     Appearance: She is well-developed. She is obese.   HENT:      Head: Atraumatic.   Eyes:      Conjunctiva/sclera: Conjunctivae normal.   Neck:      Musculoskeletal: Neck supple.   Cardiovascular:      Rate and Rhythm: Normal rate and regular rhythm.      Heart sounds: Murmur present.   Pulmonary:      Effort: Pulmonary effort is normal.      Breath sounds: No wheezing.   Abdominal:      General: Bowel sounds are normal. There is no distension.       Palpations: Abdomen is soft.      Tenderness: There is no abdominal tenderness.   Musculoskeletal: Normal range of motion.         General: No deformity.   Neurological:      Mental Status: She is alert and oriented to person, place, and time. Mental status is at baseline.         Significant Labs: All pertinent labs within the past 24 hours have been reviewed.    Significant Imaging: I have reviewed all pertinent imaging results/findings within the past 24 hours.

## 2020-09-21 NOTE — ASSESSMENT & PLAN NOTE
"Patient presented with fever, tachypnea, with normal white blood cell count however with a left shift, hypotension, and mild lactic acidosis.    Patient started empiric broad-spectrum antibiotics.    No clear source of infection identified - GI vs .   Improved with normal BP and HR today.    Patient admitted to [facility] from 6/24 - 6/29 for Sepsis - "She was started on IV Vanc/Zosyn along with IV fluids. CT abdomen/pelvis and US abdomen was unremarkable. Hepatology was consulted given elevated LFTs and believe presentation is correlated with possibility of passed sludge/stone with possible cholangitis. Blood cx + GNRs. Switched Zosyn to Rocephin as E coli and Proteus sensitive to rocephin. Vitals, labs, and symptoms continued to improve.  Hepatology recommending EUS for possible ERCP, which patient declined given age, improvement in symptoms, and labs. Patient received a midline and will be discharged home with IV Cefriaxone for a total 14 day treatment for GNR bacteremia (estimated end date: 7/8/2020)".      CXR on Admission - New left retrocardiac atelectasis or consolidation.  US of Abd on 9/14/2020 - Extrahepatic common duct measures 9 mm, mildly increased in size compared to June 2020. Mild intrahepatic bile duct dilatation.  Please correlate with laboratory values.  US of Kidneys on 9/14/2020 - No evidence of hydronephrosis.  Continued pneumobilia left hepatic lobe, similar compared to previous.    Labs on Admission:  COVID-19 Rapid negative  Lactic Acid - 3.9, 1.1, 1.2  ESR 34/.6  ProCal 12.07 and 10.67  Lipase normal    Cultures:  Blood x 2 on 9/12 - 1/4 positive for Klebsiella pneumonia - sensitive  Blood culture x 2 on 9/15 - NGTD  Urine Culture on 9/15 negative    Consulted GI - Discussed with Dr. Vines on 9/15.  Thinks clinically that cholangitis most likely cause of bacteremia.  Recommended transfer to [facility] for ERCP, but Dr. Ratliff with advanced endoscopy declined transfer as " "patient declined ERCP in past and he does not think this is Cholangitis and does not think ERCP is warranted.  They discussed together and decision is to empirically treat with IV antibiotics.  Patient declined ERCP last visit as well.    Seen by ID - "Pseudomonas septicemia - etiology is unclear to me but GI/ sources are the most likely  - given history of bacteremia, GI seems more likely  - agree with ERCP  - continue abx (could de-escalate to cefepime but would avoid quinolones in this elderly patient, if possible)  - will probably need 2 weeks of parenteral therapy  - d/w patient and granddaughter".    Plan:  Continue Zosyn - day #9/14 of antibiotics  PT/OT consult recommends SNF - Awaiting placement.  GI follow up on discharge    "

## 2020-09-21 NOTE — PLAN OF CARE
CM  Spoke to pt and granddaughter, Beni Sahu, both are in agreement for [facility] in [facility].     Updated information has been sent to facility, they plan to admit pt on tomorrow.    CM to inform granddaughter and follow-up with plans.

## 2020-09-21 NOTE — PLAN OF CARE
[facility]     Department of Hospital Medicine     [address] [place] [address]     [phone] after hours  [phone] fax       SKILLED NURSING HOME ORDERS    09/22/2020    Admit to Nursing Home:  Skilled Bed                                                Diagnoses:  Active Hospital Problems    Diagnosis  POA    *Sepsis due to Pseudomonas species [A41.52]  Yes     Priority: 1 - High    C. difficile diarrhea [A04.72]  No     Priority: 2     Paroxysmal atrial fibrillation [I48.0]  Yes     Priority: 3     Type 2 diabetes mellitus with diabetic neuropathy, without long-term current use of insulin [E11.40]  Yes     Priority: 4     Essential hypertension [I10]  Yes     Priority: 5     DINO (acute kidney injury) [N17.9]  Yes     Priority: 6     Dyslipidemia [E78.5]  Yes     Priority: 7     Debility [R53.81]  Yes     Priority: 8     Hypokalemia [E87.6]  Yes    Hypomagnesemia [E83.42]  No    Hyponatremia [E87.1]  Yes      Resolved Hospital Problems    Diagnosis Date Resolved POA    Pseudomonas septicemia [A41.52] 09/17/2020 No       Patient is homebound due to:  Sepsis due to Pseudomonas species    Allergies:  Review of patient's allergies indicates:   Allergen Reactions    Iodinated contrast media Hives       Vitals:  Every shift (Skilled Nursing patients)    Diet: Diabetic    Acitivities:  As tolerated and per PT/OT    LABS:  Per facility protocol   CMP, CBC weekly while on IV antibiotics    Nursing Precautions:    - Aspiration precautions:             - Total assistance with meals            -  Upright 90 degrees befor during and after meals             -  Suction at bedside          - Fall precautions per nursing home protocol   - Decubitus precautions:        -  for positioning   - Pressure reducing foam mattress   - Turn patient every two hours. Use wedge pillows to anchor patient    CONSULTS:        Physical Therapy to evaluate and  treat     Occupational Therapy to evaluate and treat    MISCELLANEOUS CARE:      Nino Care: Empty Nino bag every shift.  Please perform voiding trial once ambulating better.  If fails voiding trial, will need to see Urology.    Midline Care:  Per routine    Routine Skin for Bedridden Patients:  Apply moisture barrier cream to all  skin folds and wet areas in perineal area daily and after baths and   all bowel movements.                    DIABETES CARE:       Check blood sugar:  Fingerstick blood sugar AC and HS   Fingerstick blood sugar every 6 hours if unable to eat      Report CBG < 60 or > 400 to physician.                                          Insulin Sliding Scale          Glucose  Novolog Insulin Subcutaneous        0 - 60   Orange juice or glucose tablet, hold insulin      No insulin   201-250  2 units   251-300  4 units   301-350  6 units   351-400  8 units   >400   10 units then call physician      Medications: Discontinue all previous medication orders, if any. See new list below.     Erin Sahu   Home Medication Instructions BRIDGER:[id number]    Printed on:09/21/20 7221   Medication Information                      acetaminophen (TYLENOL) 325 MG tablet  Take 325 mg by mouth every 6 (six) hours as needed for Pain.             alcohol swabs PadM  Apply 1 each topically once daily.             apixaban (ELIQUIS) 5 mg Tab  Take 1 tablet (5 mg total) by mouth 2 (two) times daily.             aspirin (ECOTRIN) 81 MG EC tablet  Take 1 tablet (81 mg total) by mouth once daily.             carvediloL (COREG) 12.5 MG tablet  Take 12.5 mg by mouth 2 (two) times daily.             diabetic supplies, miscellan. Misc  TRUEPLUS SUPER THIN 28G LANCET. USE AS DIRECTED TO TEST BLOOD SUGAR TWICE DAILY.             lactobacillus acidophilus & bulgar (LACTINEX) 100 million cell packet  Take 1 packet (1 each total) by mouth 2 (two) times daily.             metFORMIN (GLUCOPHAGE) 500 MG tablet  Take 1 tablet (500  mg total) by mouth every evening.             NIFEdipine (PROCARDIA-XL) 30 MG (OSM) 24 hr tablet  Take 1 tablet (30 mg total) by mouth once daily.             nitroGLYCERIN (NITROSTAT) 0.4 MG SL tablet  Place 1 tablet (0.4 mg total) under the tongue every 5 (five) minutes as needed.             pantoprazole (PROTONIX) 40 MG tablet  TAKE 1 TABLET (40 MG TOTAL) BY MOUTH ONCE DAILY.             piperacillin sodium/tazobactam (PIPERACILLIN-TAZOBACTAM 4.5G/100ML D5W IVPB, READY TO MIX,)  Inject 100 mLs (4.5 g total) into the vein every 8 (eight) hours. for 5 days - completes on 9/26/2020             pravastatin (PRAVACHOL) 20 MG tablet  Take 1 tablet (20 mg total) by mouth nightly.             TRUE METRIX GLUCOSE TEST STRIP Strp  USE TO TEST BLOOD SUGARS 2 TIMES DAILY.             vancomycin 25 mg/mL Soln  Take 5 mLs (125 mg total) by mouth every 6 (six) hours. for 7 days - completes on 9/28/2020                       _________________________________  Darius White MD  09/21/2020

## 2020-09-21 NOTE — PT/OT/SLP PROGRESS
Physical Therapy Treatment    Patient Name:  Erin Sahu   MRN:  [id number]    Recommendations:     Discharge Recommendations:  nursing facility, skilled   Discharge Equipment Recommendations: (Defer to post acute)   Barriers to discharge: None    Assessment:     Erin Sahu is a 89 y.o. female admitted with a medical diagnosis of Sepsis due to Pseudomonas species.  She presents with the following impairments/functional limitations:weakness, impaired endurance, impaired self care skills, impaired functional mobilty, gait instability, impaired balance, decreased upper extremity function, decreased lower extremity function, decreased safety awareness, decreased ROM, impaired fine motor, edema.    Progressing toward goals.  Pending dc today or tomorrow.  Pt agreed to participate and amb in room to chair.     Rehab Prognosis: Good; patient would benefit from acute skilled PT services to address these deficits and reach maximum level of function.    Recent Surgery: * No surgery found *      Plan:     During this hospitalization, patient to be seen 5 x/week to address the identified rehab impairments via therapeutic activities, therapeutic exercises, neuromuscular re-education, gait training and progress toward the following goals:    · Plan of Care Expires:  10/08/20    Subjective     Chief Complaint: NONE STATED  Patient/Family Comments/goals: stated she wants to get up in the chair.  It will feel good to get back in a fresh bed-(PCT changed bed linens while she was up in chair.)  Pain/Comfort:  · Pain Rating 1: 0/10  · Pain Rating Post-Intervention 1: 0/10      Objective:     Communicated with nurse prior to session.  Patient found HOB elevated with castle catheter, PICC line, telemetry, bed alarm, oxygen upon PT entry to room.     General Precautions: Standard, diabetic, fall, respiratory   Orthopedic Precautions:N/A   Braces: N/A     Functional Mobility:  · Bed Mobility:     · Rolling Right: moderate assistance and  side rail  · Supine to Sit: moderate assistance  · Sit to Supine: n/a -left up in chair  · Transfers:     · Sit to Stand:  minimum assistance with rolling walker and cues for hand placement  · Gait: pt amb 5' with RW and min a.  very small reciprocal gait, decreased gerardo.  cues for turning around and backing up to chair , reach back before descent into chair.    · Balance:  Good sitting,  fair standing      AM-PAC 6 CLICK MOBILITY  Turning over in bed (including adjusting bedclothes, sheets and blankets)?: 3  Sitting down on and standing up from a chair with arms (e.g., wheelchair, bedside commode, etc.): 3  Moving from lying on back to sitting on the side of the bed?: 2  Moving to and from a bed to a chair (including a wheelchair)?: 2  Need to walk in hospital room?: 3  Climbing 3-5 steps with a railing?: 1  Basic Mobility Total Score: 14       Therapeutic Activities and Exercises:   pt assist to EOB, required assist also  to scoot to get feet on ground.  Gait as above.  Pt in chair and performed B LE ex 1 set of 10  Ankle pumps  Hip abd/add  Hip flex  Long arc quads with verbal and visual cues.     Patient left up in chair with all lines intact, call button in reach, chair alarm on and nurse notified..    GOALS:   Multidisciplinary Problems     Physical Therapy Goals        Problem: Physical Therapy Goal    Goal Priority Disciplines Outcome Goal Variances Interventions   Physical Therapy Goal     PT, PT/OT Ongoing, Progressing     Description: Goals to be met by: 10/8/20    Patient will increase functional independence with mobility by performin. Sit<>stand with SBA with RW.  2. Gait x 100 feet with SBA with RW.  3. Supine<>sit without use of hospital bed features with supervision.                    Time Tracking:     PT Received On: 20  PT Start Time: 1417     PT Stop Time: 1456  PT Total Time (min): 39 min     Billable Minutes: Gait Training 9, Therapeutic Activity 17 and Therapeutic Exercise  13    Treatment Type: Treatment  PT/PTA: PT     PTA Visit Number: 0     Ana Laura Laws, PT  09/21/2020

## 2020-09-21 NOTE — PT/OT/SLP PROGRESS
Occupational Therapy   Treatment    Name: Erin Sahu  MRN: [id number]  Admitting Diagnosis:  Sepsis due to Pseudomonas species       Recommendations:     Discharge Recommendations: nursing facility, skilled  Discharge Equipment Recommendations:  (TBD at next level of care.)  Barriers to discharge:  Decreased caregiver support(Pt's current level of function and she lives alone.)    Assessment:     Erin Sahu is a 89 y.o. female with a medical diagnosis of Sepsis due to Pseudomonas species.  She presents sitting up in bedside chair and stating she doesn't want to get back into bed yet. Performance deficits affecting function are weakness, impaired endurance, impaired self care skills, impaired functional mobilty, gait instability, impaired sensation, impaired balance, decreased lower extremity function, decreased upper extremity function, decreased ROM, impaired skin, decreased safety awareness, impaired cardiopulmonary response to activity, decreased coordination, impaired fine motor, edema, impaired joint extensibility.     Rehab Prognosis:  Fair/Good; patient would benefit from acute skilled OT services to address these deficits and reach maximum level of function.       Plan:     Patient to be seen 5 x/week to address the above listed problems via self-care/home management, therapeutic activities, therapeutic exercises  · Plan of Care Expires: 10/17/20  · Plan of Care Reviewed with: patient    Subjective     Pain/Comfort:  · Pain Rating 1: 0/10  · Pain Rating Post-Intervention 1: 0/10    Objective:     Communicated with: nurseSoha, prior to session.  Patient found up in chair with castle catheter, PICC line, telemetry, oxygen upon OT entry to room.    General Precautions: Standard, diabetic, fall, respiratory, hearing impaired, pacemaker   Orthopedic Precautions:N/A   Braces: N/A     Occupational Performance:     COGNITION:     Level of arousal/Mood: Alert; cooperative    Orientation: Person, place,  situation    Judgement/Safety Awareness: Fair    Follows Commands: 100%; Pt is [place].    ADL PERFORMANCE:     Feeding: Set up   Grooming: Set up sitting in chair   UE Dressing: Min A   LE Dressing: Max A   UE Bathing: NT   LE Bathing: NT   Toileting: Mod A; Mild bowel incontinence; Pt able to stand for 4 minutes x 2 at SBA using RW while OT performed pt's hygiene after bowel incontinence (infection control due to C Diff)   Nurse changed pt's sacral dressing during OT tx session.    FUNCTIONAL MOBILITY:      Bed Rolling: NT   Supine to Sit: NT   Sit to Supine: NT   Sit to Stand: SBA using RW and verbal cues for sequencing/technique   Chair Transfer: SBA with RW   BSC Transfer: NT   Toilet Transfer: NT      AMPAC 6 Click ADL: 16    Treatment & Education:  Role of OT, POC, safety with ADLs and ADL mobility using RW, sequencing/technique for safe sit<>stand    Patient left up in chair with all lines intact, call button in reach and nurseSoha notifiedEducation:      GOALS:   Multidisciplinary Problems     Occupational Therapy Goals        Problem: Occupational Therapy Goal    Goal Priority Disciplines Outcome Interventions   Occupational Therapy Goal     OT, PT/OT Ongoing, Progressing    Description: Goals to be met by: 9/26/20    Patient will increase functional independence with ADLs by performing:    UB dressing at SBA.  LB dressing at SBA.  Grooming standing at the sink at SBA.  Toileting at SBA.  Toilet/BSC transfers at SBA.  Sponge bathing at SBA.                     Time Tracking:     OT Date of Treatment: 09/21/20  OT Start Time: 1522  OT Stop Time: 1550  OT Total Time (min): 28 min    Billable Minutes:Self Care/Home Management 15  Therapeutic Activity 13    SERGE Myrick  9/21/2020

## 2020-09-21 NOTE — PLAN OF CARE
Problem: Occupational Therapy Goal  Goal: Occupational Therapy Goal  Description: Goals to be met by: 9/26/20    Patient will increase functional independence with ADLs by performing:    UB dressing at SBA.  LB dressing at SBA.  Grooming standing at the sink at SBA.  Toileting at SBA.  Toilet/BSC transfers at SBA.  Sponge bathing at SBA.    Outcome: Ongoing, Progressing   Progressing towards goals.  Pt to benefit from continued acute care OT services to increase independence in self-care/functional transfers.  Continue POC.  Recommend SNF.

## 2020-09-21 NOTE — PLAN OF CARE
No significant events overnight. Remains free from fall, injury, and skin breakdown. Contact precautions maintained. Voiding clear yellow output via Nino; good output. No BM overnight. VSS on 2L O2 via NC throughout the night; plan to taper down today. Positions self with assistance; q2h turning tolerated. Denies pain. Tele maintained; all alarms active and audible. R femoral central line and L cephalic midline/dressings both CDI. Maintenance fluids running. Refuses SCDs at this time. Heels floated. Foam dressing to sacrum, R arm, and R 2nd toe CDI. Accuchecks maintained; no PRN insulin needed. Plan of care reviewed with patient and all questions answered. Bed low, locked. Call light within reach. Purposeful rounding performed. Resting comfortably in bed, no other complaints at this time.

## 2020-09-21 NOTE — PROGRESS NOTES
"[facility] Hospital Medicine  Progress Note    Patient Name: Erin Sahu  MRN: [id number]  Patient Class: IP- Inpatient   Admission Date: 9/13/2020  Length of Stay: 8 days  Attending Physician: Darius White MD  Primary Care Provider: Junior Godinez MD        Subjective:     Principal Problem:Sepsis due to Pseudomonas species        HPI:  Per Vikas Goode, NP:    "The patient is a 89 y.o. female who has a past medical history of coronary artery disease, diabetes mellitus type II, HHD (hypertensive heart disease), hyperlipidemia, hypertension, and syncope presented to [place] with fever/chills and hypotension.  Patient states she started feeling weak and lethargic this morning, so her grand daughter called EMS.  On presentation to outside facility, patient was febrile and hypotensive requiring pressor support.  Her lactic was elevated above 3, so broad spectrum antibiotics initiated.  She was transferred to [facility] for continued intensive care."    Overview/Hospital Course:  Patient is a 89-year-old woman with hypertension, diabetes mellitus type 2, dyslipidemia, coronary artery disease who presented to [facility] with new onset of fevers and chills and hypotensive require vasopressors.  Patient with elevated white blood cell count and mild lactic acidosis.  Patient started empiric broad-spectrum antibiotics for possible sepsis.  No immediate source of infection was identified.  Vasopressors weaned off and transferred out of ICU.  Blood cultures grew Pseudomonas aeruginosa and continued on Zosyn - plan is for 2 weeks total of antibiotics.  Source per ID is probably GI vs .  GI here wanted to transfer to [facility] for ERCP, but Endoscopy at [facility] declined need.  Will have her follow up with GI on discharge to reconsider further evaluation.  She developed urine retention and needed Nino.  Will need a voiding trial after discharge.  She developed diarrhea on 9/17/2020 and C Diff " positive on 9/18/2020.  Started on po Vanc.  Diarrhea resolved.  PT recommend SNF.      Interval History:  Overall stable.  No acute events reported overnight.  She is awake and alert.  No further diarrhea per patient.  No abdominal pain.  Tolerating po Vanc.  Discussed plan of treatment.    Review of Systems   Constitutional: Positive for fatigue. Negative for chills and fever.   HENT: Negative for ear pain.    Eyes: Negative for pain.   Respiratory: Negative for shortness of breath and wheezing.    Cardiovascular: Negative for chest pain.   Gastrointestinal: Negative for abdominal distention, abdominal pain, constipation, diarrhea, nausea and vomiting.   Genitourinary: Negative for dysuria and frequency.   Musculoskeletal: Negative for arthralgias and myalgias.   Neurological: Negative for light-headedness.   Psychiatric/Behavioral: Negative for agitation and confusion.     Objective:     Vital Signs (Most Recent):  Temp: 97.8 °F (36.6 °C) (09/21/20 0711)  Pulse: 60 (09/21/20 0711)  Resp: 16 (09/21/20 0711)  BP: (!) 150/70 (09/21/20 0711)  SpO2: (!) 93 % (09/21/20 0711) Vital Signs (24h Range):  Temp:  [97.4 °F (36.3 °C)-98.3 °F (36.8 °C)] 97.8 °F (36.6 °C)  Pulse:  [58-77] 60  Resp:  [16-17] 16  SpO2:  [93 %-98 %] 93 %  BP: (134-157)/(64-76) 150/70     Weight: 84.4 kg (186 lb 1.1 oz)  Body mass index is 30.96 kg/m².    Intake/Output Summary (Last 24 hours) at 9/21/2020 0854  Last data filed at 9/21/2020 0600  Gross per 24 hour   Intake 2929 ml   Output 2550 ml   Net 379 ml      Physical Exam  Constitutional:       General: She is not in acute distress.     Appearance: She is well-developed. She is obese.   HENT:      Head: Atraumatic.   Eyes:      Conjunctiva/sclera: Conjunctivae normal.   Neck:      Musculoskeletal: Neck supple.   Cardiovascular:      Rate and Rhythm: Normal rate and regular rhythm.      Heart sounds: Murmur present.   Pulmonary:      Effort: Pulmonary effort is normal.      Breath sounds: No  "wheezing.   Abdominal:      General: Bowel sounds are normal. There is no distension.      Palpations: Abdomen is soft.      Tenderness: There is no abdominal tenderness.   Musculoskeletal: Normal range of motion.         General: No deformity.   Neurological:      Mental Status: She is alert and oriented to person, place, and time. Mental status is at baseline.         Significant Labs: All pertinent labs within the past 24 hours have been reviewed.    Significant Imaging: I have reviewed all pertinent imaging results/findings within the past 24 hours.      Assessment/Plan:      * Sepsis due to Pseudomonas species  Patient presented with fever, tachypnea, with normal white blood cell count however with a left shift, hypotension, and mild lactic acidosis.    Patient started empiric broad-spectrum antibiotics.    No clear source of infection identified - GI vs .   Improved with normal BP and HR today.    Patient admitted to [facility] from 6/24 - 6/29 for Sepsis - "She was started on IV Vanc/Zosyn along with IV fluids. CT abdomen/pelvis and US abdomen was unremarkable. Hepatology was consulted given elevated LFTs and believe presentation is correlated with possibility of passed sludge/stone with possible cholangitis. Blood cx + GNRs. Switched Zosyn to Rocephin as E coli and Proteus sensitive to rocephin. Vitals, labs, and symptoms continued to improve.  Hepatology recommending EUS for possible ERCP, which patient declined given age, improvement in symptoms, and labs. Patient received a midline and will be discharged home with IV Cefriaxone for a total 14 day treatment for GNR bacteremia (estimated end date: 7/8/2020)".      CXR on Admission - New left retrocardiac atelectasis or consolidation.  US of Abd on 9/14/2020 - Extrahepatic common duct measures 9 mm, mildly increased in size compared to June 2020. Mild intrahepatic bile duct dilatation.  Please correlate with laboratory values.  US of Kidneys " "on 9/14/2020 - No evidence of hydronephrosis.  Continued pneumobilia left hepatic lobe, similar compared to previous.    Labs on Admission:  COVID-19 Rapid negative  Lactic Acid - 3.9, 1.1, 1.2  ESR 34/.6  ProCal 12.07 and 10.67  Lipase normal    Cultures:  Blood x 2 on 9/12 - 1/4 positive for Klebsiella pneumonia - sensitive  Blood culture x 2 on 9/15 - NGTD  Urine Culture on 9/15 negative    Consulted GI - Discussed with Dr. Vines on 9/15.  Thinks clinically that cholangitis most likely cause of bacteremia.  Recommended transfer to [facility] for ERCP, but Dr. Ratliff with advanced endoscopy declined transfer as patient declined ERCP in past and he does not think this is Cholangitis and does not think ERCP is warranted.  They discussed together and decision is to empirically treat with IV antibiotics.  Patient declined ERCP last visit as well.    Seen by ID - "Pseudomonas septicemia - etiology is unclear to me but GI/ sources are the most likely  - given history of bacteremia, GI seems more likely  - agree with ERCP  - continue abx (could de-escalate to cefepime but would avoid quinolones in this elderly patient, if possible)  - will probably need 2 weeks of parenteral therapy  - d/w patient and granddaughter".    Plan:  Continue Zosyn - day #9/14 of antibiotics  PT/OT consult recommends SNF - Awaiting placement.  GI follow up on discharge      C. difficile diarrhea  Started in evening on 9/17/2020 - 3 episodes and watery.  No abdominal pain or dysuria.  Most likely antibiotic-associated.  No history of C.Diff in past.  CDiff positive  Diarrhea now resolved  -Start po Vanc 125mg q6 on 9/19/2020 (had only itching with IV Vanc in past).  -Continue special contact precautions  -started probiotics      Paroxysmal atrial fibrillation  In sinus rhythm on admission.  Afib with RVR with HR in 130-140s in AM of 9/15/2020.  IV Metoprolol 5mg x 1 given.  In NSR this morning  -Continue Carvedilol and Apixaban.        Type 2 " diabetes mellitus with diabetic neuropathy, without long-term current use of insulin  Home Meds:  Metformin 500mg qHAS  A1C was 6.7 on admission  -Continue with sliding scale insulin now.    Essential hypertension  Hypotensive on admission - resolved.  Home Meds:  Carvedilol 12.5mg bid, Irbesartan 150mg qday, Lasix 20mg QOD, Clonidine 0.1mg prn  Hospital Meds:  Carvedilol 12.5mg bid and Nifedipine XR 30mg daily   -Continue and monitor    DINO (acute kidney injury)  Creatinine 1.6 on admission and improved to 0.7 after IV fluids  Monitor closely      Dyslipidemia  Continue statin therapy.    Debility  Consult physical and occupational therapy - SNF placement recommended      Hypomagnesemia  Replace as needed      Hypokalemia  Replace as needed      Hyponatremia  Na 129 on admission and 136 yesterday.              VTE Risk Mitigation (From admission, onward)         Ordered     apixaban tablet 5 mg  2 times daily      09/14/20 0825     Reason for No Pharmacological VTE Prophylaxis  Once     Question:  Reasons:  Answer:  Already adequately anticoagulated on oral Anticoagulants    09/13/20 0008     Place HOLDEN hose  Until discontinued      09/13/20 0008     IP VTE HIGH RISK PATIENT  Once      09/13/20 0008     Place sequential compression device  Until discontinued      09/13/20 0008                Discharge Planning   LAITH: 9/21/2020     Code Status: Full Code   Is the patient medically ready for discharge?:     Reason for patient still in hospital (select all that apply): Patient trending condition and Treatment  Discharge Plan A: Skilled Nursing Facility                  Darius White MD  Department of Hospital Medicine   [facility]

## 2020-09-21 NOTE — PLAN OF CARE
Progressing toward goals.  Pending dc today or tomorrow.  Pt agreed to participate and amb in room to chair.  LE ex in sitting .  Left up in chair.   REC:  SNF  DME: defer to SNF

## 2020-09-22 VITALS
BODY MASS INDEX: 31 KG/M2 | WEIGHT: 186.06 LBS | OXYGEN SATURATION: 93 % | HEIGHT: 65 IN | TEMPERATURE: 98 F | DIASTOLIC BLOOD PRESSURE: 72 MMHG | HEART RATE: 71 BPM | RESPIRATION RATE: 18 BRPM | SYSTOLIC BLOOD PRESSURE: 149 MMHG

## 2020-09-22 LAB
POCT GLUCOSE: 168 MG/DL (ref 70–110)
POCT GLUCOSE: 185 MG/DL (ref 70–110)

## 2020-09-22 PROCEDURE — 25000003 PHARM REV CODE 250: Performed by: INTERNAL MEDICINE

## 2020-09-22 PROCEDURE — 90694 VACC AIIV4 NO PRSRV 0.5ML IM: CPT | Performed by: INTERNAL MEDICINE

## 2020-09-22 PROCEDURE — 99239 HOSP IP/OBS DSCHRG MGMT >30: CPT | Mod: ,,, | Performed by: HOSPITALIST

## 2020-09-22 PROCEDURE — 63600175 PHARM REV CODE 636 W HCPCS: Performed by: INTERNAL MEDICINE

## 2020-09-22 PROCEDURE — 90471 IMMUNIZATION ADMIN: CPT | Performed by: INTERNAL MEDICINE

## 2020-09-22 PROCEDURE — 99239 PR HOSPITAL DISCHARGE DAY,>30 MIN: ICD-10-PCS | Mod: ,,, | Performed by: HOSPITALIST

## 2020-09-22 RX ADMIN — NIFEDIPINE 30 MG: 30 TABLET, FILM COATED, EXTENDED RELEASE ORAL at 09:09

## 2020-09-22 RX ADMIN — Medication 125 MG: at 05:09

## 2020-09-22 RX ADMIN — PIPERACILLIN SODIUM AND TAZOBACTAM SODIUM 4.5 G: 4; .5 INJECTION, POWDER, LYOPHILIZED, FOR SOLUTION INTRAVENOUS at 04:09

## 2020-09-22 RX ADMIN — Medication 125 MG: at 12:09

## 2020-09-22 RX ADMIN — ASPIRIN 81 MG: 81 TABLET, COATED ORAL at 09:09

## 2020-09-22 RX ADMIN — CARVEDILOL 12.5 MG: 12.5 TABLET, FILM COATED ORAL at 09:09

## 2020-09-22 RX ADMIN — ACETAMINOPHEN 650 MG: 325 TABLET, FILM COATED ORAL at 12:09

## 2020-09-22 RX ADMIN — PIPERACILLIN SODIUM AND TAZOBACTAM SODIUM 4.5 G: 4; .5 INJECTION, POWDER, LYOPHILIZED, FOR SOLUTION INTRAVENOUS at 12:09

## 2020-09-22 RX ADMIN — ACETAMINOPHEN 650 MG: 325 TABLET, FILM COATED ORAL at 01:09

## 2020-09-22 RX ADMIN — PANTOPRAZOLE SODIUM 40 MG: 40 TABLET, DELAYED RELEASE ORAL at 09:09

## 2020-09-22 RX ADMIN — APIXABAN 5 MG: 2.5 TABLET, FILM COATED ORAL at 09:09

## 2020-09-22 RX ADMIN — A/SINGAPORE/GP1908/2015 IVR-180 (AN A/MICHIGAN/45/2015 (H1N1)PDM09-LIKE VIRUS, A/HONG KONG/4801/2014, NYMC X-263B (H3N2) (AN A/HONG KONG/4801/2014-LIKE VIRUS), AND B/BRISBANE/60/2008, WILD TYPE (A B/BRISBANE/60/2008-LIKE VIRUS) 0.5 ML: 15; 15; 15 INJECTION, SUSPENSION INTRAMUSCULAR at 12:09

## 2020-09-22 NOTE — PLAN OF CARE
CM met with pt and spoke to granddaughter, Beni Sahu, [phone], by phone.    Pt discharging to [facility] today. Transportation to be arranged thru [facility].    CM to inform pt's nurse to call report to [phone] and ask for Stephanie.    Pt ready for discharge from CM perspective.   09/22/20 1044   Final Note   Assessment Type Final Discharge Note   Anticipated Discharge Disposition SNF   What phone number can be called within the next 1-3 days to see how you are doing after discharge? [phone]   Hospital Follow Up  Appt(s) scheduled? Yes   Discharge plans and expectations educations in teach back method with documentation complete? Yes   Right Care Referral Info   Post Acute Recommendation SNF / Sub-Acute Rehab

## 2020-09-22 NOTE — NURSING
Wound care follow up: with Kyung Lopez RN    Removed right elbow dressings, skin dry, anterior forearm elbow with 0.2 cm skin tear and one 0.1 cm skin tear. Dried blood present, cleansed with normal saline and gauze.Edges mostly approximated opening from distal to proximal.    Second dressing removed posterior elbow, no wound present. Skin dry intact,  Reapplied bordered foam silicone dressing as preventative to skin breakdown.  Right foot third toe dressing removed, toe no longer erythematous but scab fully intact, Replaced silicone dressing.  Sacrum assessed, no breakdown present.bordered foam silicone dressing applied.

## 2020-09-22 NOTE — PT/OT/SLP PROGRESS
Physical Therapy      Patient Name:  Erin Sahu   MRN:  [id number]    Patient not seen today secondary to patient already discharged    Sascha Kirk PTA

## 2020-09-22 NOTE — PLAN OF CARE
No significant events overnight. Remains free from fall, injury, and skin breakdown. Contact precautions maintained. Voiding clear yellow output via Nino; good output. BM x1 overnight; soft. VSS on RA throughout the night. Positions self with assistance; q2h turning tolerated. PRN tylenol given x1 for headache/knee pain; now resolved. Tele maintained; all alarms active and audible. L cephalic midline/dressing CDI. Maintenance fluids running. Refuses heel float at this time. Foam dressing to sacrum, R arm, and R 2nd toe CDI. TB skin test marked on R forearm. Accuchecks maintained; PRN insulin given according to sliding scale. Plan of care reviewed with patient and all questions answered. Bed low, locked. Call light within reach. Purposeful rounding performed. Resting comfortably in bed, no other complaints at this time.

## 2020-09-22 NOTE — PLAN OF CARE
Pt discharging to [facility] today, Report passed to Stephanie, accepting Nurse in [place] [facility] report.     Transport via stretcher Arranged for 1 pm per Cse Sandip BAHENA

## 2020-09-22 NOTE — NURSING
"Palliative Care Daily Progress Note:  Principal Problem:Sepsis  Chief Complaint: No chief complaint on file.  HPI: The patient is a 89 y.o. female who has a past medical history of coronary artery disease, diabetes mellitus type II, HHD (hypertensive heart disease), hyperlipidemia, hypertension, and syncope presented to [facility] with fever/chills and hypotension.  Patient states she started feeling weak and lethargic this morning, so her grand daughter called EMS.  On presentation to outside facility, patient was febrile and hypotensive requiring pressor support.  Her lactic was elevated above 3, so broad spectrum antibiotics initiated.  She was transferred to [facility] for continued intensive care.      Admission Weight/BMI & Noted Weight/BMI in EMR:    84.4 kg (186 lb 1.1 oz)/ 30.96 kg/m²       Date: Weight in kg (lbs): BMI: Height:   12/30/2016 91 kg (200 lb 9.9 oz) 33.38 kg/m²    5' 5" (165.1 cm)    3/7/2017 85.3 kg (188 lb 0.8 oz) 31.29 kg/m²    5' 5" (165.1 cm)      Family History      None     Per Dr. White dated 9/21/20 at 0857:  "The patient is a 89 y.o. female who has a past medical history of coronary artery disease, diabetes mellitus type II, HHD (hypertensive heart disease), hyperlipidemia, hypertension, and syncope presented to [facility] with fever/chills and hypotension.  Patient states she started feeling weak and lethargic this morning, so her grand daughter called EMS.  On presentation to outside facility, patient was febrile and hypotensive requiring pressor support.  Her lactic was elevated above 3, so broad spectrum antibiotics initiated.  She was transferred to [facility] for continued intensive care."     Overview/Hospital Course:  Patient is a 89-year-old woman with hypertension, diabetes mellitus type 2, dyslipidemia, coronary artery disease who presented to [facility] with new onset of fevers and chills and hypotensive require vasopressors.  Patient with elevated white blood cell count and " mild lactic acidosis.  Patient started empiric broad-spectrum antibiotics for possible sepsis.  No immediate source of infection was identified.  Vasopressors weaned off and transferred out of ICU.  Blood cultures grew Pseudomonas aeruginosa and continued on Zosyn - plan is for 2 weeks total of antibiotics.  Source per ID is probably GI vs .  GI here wanted to transfer to [facility] for ERCP, but Endoscopy at [facility] declined need.  Will have her follow up with GI on discharge to reconsider further evaluation.  She developed urine retention and needed Nino.  Will need a voiding trial after discharge.  She developed diarrhea on 9/17/2020 and C Diff positive on 9/18/2020.  Started on po Vanc.  Diarrhea resolved.  PT recommend SNF.       S:   Medical record reviewed, followed up with Dr. Tee regarding patient's condition. Physician's Plan of Care Goal is GOC and AD.     B:   Code Status:   Advanced Directives:      Yes/No/Unknown Date Completed: In EMR:   HCPOA Yes 10/11/2017 Yes   Living Will Yes 1/9/2019 Yes   [place] Unknown N/A No      Family/Support:   HCPOA/ Grandchild: Beni Malcolm ([phone])  Marital Status:   ?? Listed in EMR  Spiritual Designation:  Quaker    Labs:    Ref Range & Units 3mo ago 9/12/20   SARS-CoV-2 RNA, Amplification, Qual Negative Negative  Negative CM          Lab Results   (L)   K 3.7   CL 91 (L)   CO2 25   BUN 38 (H)   CREATININE 1.6 (H)    (H)   ALKPHOS 242 (H)   AST 30   ALT 50 (H)   BILITOT 1.7 (H)   ALBUMIN 2.9 (L)   PROT 6.0   WBC 6.45   HGB 12.8   HCT 36.3 (L)    (L)         CPKMB 2.0   TROPONINI 0.015   INR 1.1    (H)   DDIMER 0.76 (H)   LACTATE 3.9 (HH)   MG 1.2 (L)   TSH 2.642      Urinalysis  -- Urinalysis performed during this ER visit showed no signs of infection          Diagnostics:   9/12/20 EKG   -- The EKG findings today were without concerning findings from baseline  -- chest x-ray showed linear atelectasis and  fibrosis, no infiltrate noted  -- CT of the chest and pelvis showed mild right abdominal wall edema  -- see virtual Radiology report for full details this evening      A:   Discharge Planning:   Per Cm note dated 9/13/20 at 1440:  Discharge Plan A Home Health   Discharge Plan B Home Health     Prior to hospitalization functional status: Independent   Current cognitive status: Alert/Oriented   Current Functional Status: Independent   Lives With alone     Equipment Currently Used at Home walker, rolling;shower chair;cane, straight     DME Needed Upon Discharge  none   SW met with patient at bedside to complete discharge planning assessment.  Patient alert and oriented xs 4.  Patient verified all demographic information on facesheet is correct.  Patient verified PCP is Dr. Junior Godinez.  Patient verified primary health insurance is [employer].  Patient with home health and listed DME.  Patient signed patient choice disclosure choosing to resume home health with [facility].  Patient states she receives 2 hours every Friday of services through the [facility].  Patient with POA (Beni Boamarisiga, grand-child) and Living Will.  Patient not on dialysis or medication coumadin.  Patient states she is on Eliquis.  Patient with no 30 day admission.  Patient with no financial issues at this time.  Patient family will provide transportation upon discharge from facility.  Patient independent with ADLs, live alone, drives self.  Patient uses [facility] [place] [place] - [id number] T [phone].      Updated discharge plan is to transfer the Pt to SNF.      R:  Support offered at this time.   Palliative Care Team will continue to follow patient.       Thank you for allowing Palliative Care to participate in the care of this patient.      Manisha Glass RN

## 2020-09-22 NOTE — PLAN OF CARE
Remains free from fall, injury, and skin breakdown. Turned q 2 hrs. Voiding via f/c with ayush care performed... VSS on O2 @ 1l via nc. Turned q2h turning tolerated. Pain well controlled with PO. Tele maintained; all alarms active and audible. SCDs in place. Up with pt today. Plan of care reviewed with patient and all questions answered. Bed low, locked w/ bed alarm on. Call light within reach. Purposeful rounding performed. Resting comfortably in bed, no other complaints at this time.

## 2020-09-23 ENCOUNTER — PATIENT OUTREACH (OUTPATIENT)
Dept: ADMINISTRATIVE | Facility: CLINIC | Age: 85
End: 2020-09-23

## 2020-09-23 NOTE — DISCHARGE SUMMARY
"[facility]  Hospital Medicine  Discharge Summary      Patient Name: Erin Sahu  MRN: [id number]  Admission Date: 9/13/2020  Hospital Length of Stay: 9 days  Discharge Date and Time: 9/22/2020  1:23 PM  Attending Physician: Law Tee MD  Discharging Provider: Law Tee MD  Primary Care Provider: Junior Godinez MD      HPI:   Per Vikas Goode, NP:    "The patient is a 89 y.o. female who has a past medical history of coronary artery disease, diabetes mellitus type II, HHD (hypertensive heart disease), hyperlipidemia, hypertension, and syncope presented to [place] with fever/chills and hypotension.  Patient states she started feeling weak and lethargic this morning, so her grand daughter called EMS.  On presentation to outside facility, patient was febrile and hypotensive requiring pressor support.  Her lactic was elevated above 3, so broad spectrum antibiotics initiated.  She was transferred to [facility] for continued intensive care."    Hospital Course:   Patient is a 89-year-old woman with hypertension, diabetes mellitus type 2, dyslipidemia, coronary artery disease who presented to [facility] with new onset of fevers and chills and hypotensive require vasopressors.  Patient with elevated white blood cell count and mild lactic acidosis.  Patient started empiric broad-spectrum antibiotics for possible sepsis.  No immediate source of infection was identified.  Vasopressors weaned off and transferred out of the intensive care unit.      Blood cultures subsequently were positive for Pseudomonas aeruginosa.  Patient was continued on treatment with piperacillin/tazobactam.  Infectious disease service consulted and suspect source of infection likely gastrointestinal or genitourinary tract and recommended 2 weeks of intravenous antibiotic treatment.  Patient developed urinary retention and needed Nino catheterization.  Patient also developed diarrhea on 9/17/2020 and tested " positive for C diff.  Patient started on oral vancomycin and her diarrhea resolved.  Endoscopic retrograde cholangiopancreatography considered however following input from the advanced endoscopy service it was decided not to pursue any endoscopic procedure at this time.    Skilled nursing therapy recommended by physical occupational therapy.  Patient discharged to skilled nursing facility to complete a course of intravenous antibiotics and oral vancomycin and to undergo further therapy prior to transitioning to home setting.     Consults:   Consults (From admission, onward)        Status Ordering Provider     Inpatient consult to Gastroenterology  Once     Provider:  Rene Parson MD    Completed CHRIS SMITH     Inpatient consult to Infectious Diseases  Once     Provider:  Maikel Garcia MD    Completed CHRIS SMITH     Inpatient consult to Palliative Care  Once     Provider:  Poornima Noguera DNP    Completed CHRIS SMITH          Final Active Diagnoses:    Diagnosis Date Noted POA    PRINCIPAL PROBLEM:  Sepsis due to Pseudomonas species [A41.52] 09/13/2020 Yes    Paroxysmal atrial fibrillation [I48.0] 05/01/2019 Yes    Dyslipidemia [E78.5] 09/10/2012 Yes    Essential hypertension [I10] 07/23/2015 Yes    Type 2 diabetes mellitus with diabetic neuropathy, without long-term current use of insulin [E11.40] 12/31/2012 Yes    Debility [R53.81] 06/27/2020 Yes    C. difficile diarrhea [A04.72] 09/18/2020 No    Hypokalemia [E87.6] 09/18/2020 Yes    Hypomagnesemia [E83.42] 09/18/2020 No    Hyponatremia [E87.1] 09/15/2020 Yes    DINO (acute kidney injury) [N17.9] 06/25/2020 Yes      Problems Resolved During this Admission:    Diagnosis Date Noted Date Resolved POA    Pseudomonas septicemia [A41.52] 06/27/2020 09/17/2020 No       Discharged Condition: Stable    Disposition: Skilled Nursing Facility    Follow Up:  Follow-up Information     Schedule an appointment as soon as  possible for a visit with Junior Godinez MD.    Specialty: Internal Medicine  Why: Re-establish outpatient care for management of chronic medical problems.  Contact information:  [address] [id number] [phone]             [facility].    Contact information:  [address] [address]  [phone]                  Medications:  Reconciled Home Medications:      Medication List      START taking these medications    lactobacillus acidophilus & bulgar 100 million cell packet  Commonly known as: LACTINEX  Take 1 packet (1 each total) by mouth 2 (two) times daily.     NIFEdipine 30 MG (OSM) 24 hr tablet  Commonly known as: PROCARDIA-XL  Take 1 tablet (30 mg total) by mouth once daily.     PIPERACILLIN-TAZOBACTAM 4.5G/100ML D5W IVPB (READY TO MIX)  Inject 100 mLs (4.5 g total) into the vein every 8 (eight) hours. for 5 days     vancomycin 25 mg/mL Soln  Take 5 mLs (125 mg total) by mouth every 6 (six) hours. for 7 days        CHANGE how you take these medications    apixaban 5 mg Tab  Commonly known as: ELIQUIS  Take 1 tablet (5 mg total) by mouth 2 (two) times daily.  What changed:   · medication strength  · how much to take        CONTINUE taking these medications    acetaminophen 325 MG tablet  Commonly known as: TYLENOL  Take 325 mg by mouth every 6 (six) hours as needed for Pain.     alcohol swabs Padm  Apply 1 each topically once daily.     aspirin 81 MG EC tablet  Commonly known as: ECOTRIN  Take 1 tablet (81 mg total) by mouth once daily.     carvediloL 12.5 MG tablet  Commonly known as: COREG  Take 12.5 mg by mouth 2 (two) times daily.     diabetic supplies, miscellan. Misc  TRUEPLUS SUPER THIN 28G LANCET. USE AS DIRECTED TO TEST BLOOD SUGAR TWICE DAILY.     metFORMIN 500 MG tablet  Commonly known as: GLUCOPHAGE  Take 1 tablet (500 mg total) by mouth every evening.     nitroGLYCERIN 0.4 MG SL tablet  Commonly known as: NITROSTAT  Place 1 tablet (0.4 mg  total) under the tongue every 5 (five) minutes as needed.     pantoprazole 40 MG tablet  Commonly known as: PROTONIX  TAKE 1 TABLET (40 MG TOTAL) BY MOUTH ONCE DAILY.     pravastatin 20 MG tablet  Commonly known as: PRAVACHOL  Take 1 tablet (20 mg total) by mouth nightly.     TRUE METRIX GLUCOSE TEST STRIP Strp  Generic drug: blood sugar diagnostic  USE TO TEST BLOOD SUGARS 2 TIMES DAILY.        STOP taking these medications    cloNIDine 0.1 MG tablet  Commonly known as: CATAPRES     furosemide 20 MG tablet  Commonly known as: LASIX     gabapentin 300 MG capsule  Commonly known as: NEURONTIN     irbesartan 150 MG tablet  Commonly known as: AVAPRO     LORazepam 0.5 MG tablet  Commonly known as: ATIVAN     potassium chloride SA 20 MEQ tablet  Commonly known as: K-DUR,KLOR-CON            Indwelling Lines/Drains at time of discharge:   Lines/Drains/Airways     Drain                 Urethral Catheter 09/16/20 1033 6 days                Time spent on the discharge of patient: 40 minutes  Patient was seen and examined on the date of discharge and determined to be suitable for discharge.         Law Tee MD  Department of Hospital Medicine  [facility]

## 2020-09-30 ENCOUNTER — LAB VISIT (OUTPATIENT)
Dept: LAB | Facility: HOSPITAL | Age: 85
End: 2020-09-30
Attending: HOSPITALIST
Payer: MEDICARE

## 2020-09-30 DIAGNOSIS — R30.0 DYSURIA: Primary | ICD-10-CM

## 2020-09-30 LAB
BACTERIA #/AREA URNS HPF: ABNORMAL /HPF
BILIRUB UR QL STRIP: ABNORMAL
CLARITY UR: ABNORMAL
COLOR UR: YELLOW
GLUCOSE UR QL STRIP: NEGATIVE
HGB UR QL STRIP: ABNORMAL
HYALINE CASTS #/AREA URNS LPF: 8 /LPF
KETONES UR QL STRIP: ABNORMAL
LEUKOCYTE ESTERASE UR QL STRIP: ABNORMAL
MICROSCOPIC COMMENT: ABNORMAL
NITRITE UR QL STRIP: NEGATIVE
PH UR STRIP: 6 [PH] (ref 5–8)
PROT UR QL STRIP: ABNORMAL
RBC #/AREA URNS HPF: >100 /HPF (ref 0–4)
SP GR UR STRIP: 1.02 (ref 1–1.03)
SQUAMOUS #/AREA URNS HPF: 12 /HPF
URN SPEC COLLECT METH UR: ABNORMAL
UROBILINOGEN UR STRIP-ACNC: NEGATIVE EU/DL
WBC #/AREA URNS HPF: 30 /HPF (ref 0–5)
YEAST URNS QL MICRO: ABNORMAL

## 2020-09-30 PROCEDURE — 81000 URINALYSIS NONAUTO W/SCOPE: CPT

## 2020-09-30 PROCEDURE — 87086 URINE CULTURE/COLONY COUNT: CPT

## 2020-10-02 LAB — BACTERIA UR CULT: NO GROWTH

## 2020-10-05 NOTE — TELEPHONE ENCOUNTER
Per Ellie Kendall LPN at [facility], pt is currently skilled at facility.

## 2020-10-13 ENCOUNTER — PATIENT OUTREACH (OUTPATIENT)
Dept: ADMINISTRATIVE | Facility: CLINIC | Age: 85
End: 2020-10-13

## 2020-10-13 NOTE — TELEPHONE ENCOUNTER
Unable to reach patient at number listed.    Spoke with pt's granddaughter, Beni, who was not currently with patient.  Callback number given.

## 2020-10-16 ENCOUNTER — TELEPHONE (OUTPATIENT)
Dept: INTERNAL MEDICINE | Facility: CLINIC | Age: 85
End: 2020-10-16

## 2020-10-16 PROCEDURE — G0180 MD CERTIFICATION HHA PATIENT: HCPCS | Mod: ,,, | Performed by: INTERNAL MEDICINE

## 2020-10-16 PROCEDURE — G0180 PR HOME HEALTH MD CERTIFICATION: ICD-10-PCS | Mod: ,,, | Performed by: INTERNAL MEDICINE

## 2020-10-16 NOTE — TELEPHONE ENCOUNTER
"----- Message from Lidia Ogden sent at 10/16/2020 12:00 PM CDT -----  Regarding: Home Health  Contact: Kirti with [facility]  Erin Sahu  MRN: [id number]  : 1929  PCP: Junior Godinez  Home Phone      [phone]  Work Phone      Not on file.  Mobile          Not on file.      MESSAGE:    Request to speak to a nurse regarding home health visit on 10/15/20 to admit patient per [facility] discharge.   BP 54/38 on 10/15/20 visit.  Patient has fluid to lungs, and edema to both legs. The home health nurse routed patient to the ER.   The home health visit today patient is feeling a little better but upon exam she did hear "fluid to lungs".   BP 94/57 on 10/16/20 visit.  Kirti did also request a clarification of patient's medications per nurse with PCP.     Phone # [phone]    Pharmacy - [facility] [address] [place] [id number] [facility] 8334    "

## 2020-10-16 NOTE — TELEPHONE ENCOUNTER
C3 nurse attempted to contact patient. The following occurred:   C3 nurse attempted to contact Erinanaid Sahu for a TCC post hospital discharge follow up call. The patient is unable to conduct the call @ this time. Callback requested.

## 2020-10-16 NOTE — TELEPHONE ENCOUNTER
Kirti calling to update on Mrs. Khan. She was tranferred to [facility] after being discharged from [facility] on 9/22/20. She was discharged from there on 10/12/20 and admitted to [facility]. Nurse Kirti states that the patient's condition has deteriorated since her hospitalization. She reports hypotension and extreme SOB. She states that the patient struggles just getting in & out of wheelchair and ambulates very little. Would be difficult to schedule an office visit at this time. She will send me the patient's updated medical records and med list for Dr. Godinez to review. After receiving these records, I will contact her granddaughter to set up a virtual visit with Dr. Godinez.

## 2020-10-20 ENCOUNTER — TELEPHONE (OUTPATIENT)
Dept: INTERNAL MEDICINE | Facility: CLINIC | Age: 85
End: 2020-10-20

## 2020-10-20 NOTE — TELEPHONE ENCOUNTER
----- Message from Dafne Guido sent at 10/20/2020  9:41 AM CDT -----  Contact: grand daughter- jj Sahu  MRN: [id number]  : 1929  PCP: Junior Godinez  Home Phone      [phone]  Work Phone      Not on file.  Mobile          Not on file.      MESSAGE:   Patients granddaughter is calling to see if she can come drop off some paper work for [facility] to get patient 24 hour care.      [phone]

## 2020-10-27 ENCOUNTER — DOCUMENT SCAN (OUTPATIENT)
Dept: HOME HEALTH SERVICES | Facility: HOSPITAL | Age: 85
End: 2020-10-27
Payer: MEDICARE

## 2020-10-27 NOTE — TELEPHONE ENCOUNTER
The paperwork is for [facility]. It has been completed and signed per Dr. Godinez. Debora notified.

## 2020-10-29 ENCOUNTER — DOCUMENT SCAN (OUTPATIENT)
Dept: HOME HEALTH SERVICES | Facility: HOSPITAL | Age: 85
End: 2020-10-29
Payer: MEDICARE

## 2020-11-03 RX ORDER — INSULIN PUMP SYRINGE, 3 ML
EACH MISCELLANEOUS
Qty: 1 EACH | Refills: 4 | Status: SHIPPED | OUTPATIENT
Start: 2020-11-03

## 2020-11-03 NOTE — TELEPHONE ENCOUNTER
Requested Prescriptions     Pending Prescriptions Disp Refills    blood glucose control, normal (GLUCOSE CONTROL) Soln 1 each 4     Sig: TRUE METRIX LEVEL 1 CTRL SOLN. USE AS DIRECTED.   [employer] requesting Rx for TRUE METRIX LEVEL 1 CTRL SOLN. Rx pended. Thanks.

## 2020-11-07 ENCOUNTER — EXTERNAL HOME HEALTH (OUTPATIENT)
Dept: HOME HEALTH SERVICES | Facility: HOSPITAL | Age: 85
End: 2020-11-07
Payer: MEDICARE

## 2020-11-20 ENCOUNTER — DOCUMENT SCAN (OUTPATIENT)
Dept: HOME HEALTH SERVICES | Facility: HOSPITAL | Age: 85
End: 2020-11-20
Payer: MEDICARE

## 2020-12-05 ENCOUNTER — DOCUMENT SCAN (OUTPATIENT)
Dept: HOME HEALTH SERVICES | Facility: HOSPITAL | Age: 85
End: 2020-12-05
Payer: MEDICARE

## 2020-12-05 ENCOUNTER — HOSPITAL ENCOUNTER (EMERGENCY)
Facility: HOSPITAL | Age: 85
Discharge: HOME | End: 2020-12-05
Attending: SURGERY
Payer: MEDICARE

## 2020-12-05 VITALS
OXYGEN SATURATION: 97 % | RESPIRATION RATE: 16 BRPM | TEMPERATURE: 97 F | DIASTOLIC BLOOD PRESSURE: 65 MMHG | SYSTOLIC BLOOD PRESSURE: 107 MMHG | WEIGHT: 186 LBS | HEART RATE: 68 BPM | BODY MASS INDEX: 30.95 KG/M2

## 2020-12-05 DIAGNOSIS — Z51.5 HOSPICE CARE PATIENT: Primary | ICD-10-CM

## 2020-12-05 DIAGNOSIS — R53.83 FATIGUE: ICD-10-CM

## 2020-12-05 DIAGNOSIS — N30.00 ACUTE CYSTITIS WITHOUT HEMATURIA: ICD-10-CM

## 2020-12-05 LAB
ALBUMIN SERPL BCP-MCNC: 2.1 G/DL (ref 3.5–5.2)
ALP SERPL-CCNC: 291 U/L (ref 55–135)
ALT SERPL W/O P-5'-P-CCNC: 15 U/L (ref 10–44)
AMORPH CRY URNS QL MICRO: ABNORMAL
ANION GAP SERPL CALC-SCNC: 13 MMOL/L (ref 8–16)
APTT BLDCRRT: 38.7 SEC (ref 21–32)
AST SERPL-CCNC: 30 U/L (ref 10–40)
BACTERIA #/AREA URNS HPF: ABNORMAL /HPF
BASOPHILS # BLD AUTO: 0.01 K/UL (ref 0–0.2)
BASOPHILS NFR BLD: 0.1 % (ref 0–1.9)
BILIRUB SERPL-MCNC: 2 MG/DL (ref 0.1–1)
BILIRUB UR QL STRIP: ABNORMAL
BNP SERPL-MCNC: 293 PG/ML (ref 0–99)
BUN SERPL-MCNC: 24 MG/DL (ref 10–30)
CALCIUM SERPL-MCNC: 7.9 MG/DL (ref 8.7–10.5)
CHLORIDE SERPL-SCNC: 94 MMOL/L (ref 95–110)
CK MB SERPL-MCNC: 0.5 NG/ML (ref 0.1–6.5)
CK MB SERPL-RTO: 2.8 % (ref 0–5)
CK SERPL-CCNC: 18 U/L (ref 20–180)
CK SERPL-CCNC: 18 U/L (ref 20–180)
CLARITY UR: CLEAR
CO2 SERPL-SCNC: 24 MMOL/L (ref 23–29)
COLOR UR: YELLOW
CREAT SERPL-MCNC: 1.1 MG/DL (ref 0.5–1.4)
CRP SERPL-MCNC: 234.7 MG/L (ref 0–8.2)
D DIMER PPP IA.FEU-MCNC: 0.49 MG/L FEU
DIFFERENTIAL METHOD: ABNORMAL
EOSINOPHIL # BLD AUTO: 0 K/UL (ref 0–0.5)
EOSINOPHIL NFR BLD: 0 % (ref 0–8)
ERYTHROCYTE [DISTWIDTH] IN BLOOD BY AUTOMATED COUNT: 13 % (ref 11.5–14.5)
ERYTHROCYTE [SEDIMENTATION RATE] IN BLOOD BY WESTERGREN METHOD: 75 MM/HR (ref 0–20)
EST. GFR  (AFRICAN AMERICAN): 51 ML/MIN/1.73 M^2
EST. GFR  (NON AFRICAN AMERICAN): 44 ML/MIN/1.73 M^2
FERRITIN SERPL-MCNC: 434 NG/ML (ref 20–300)
GLUCOSE SERPL-MCNC: 204 MG/DL (ref 70–110)
GLUCOSE UR QL STRIP: ABNORMAL
GROUP A STREP, MOLECULAR: NEGATIVE
HCT VFR BLD AUTO: 35.9 % (ref 37–48.5)
HGB BLD-MCNC: 11.8 G/DL (ref 12–16)
HGB UR QL STRIP: NEGATIVE
IMM GRANULOCYTES # BLD AUTO: 0.03 K/UL (ref 0–0.04)
IMM GRANULOCYTES NFR BLD AUTO: 0.4 % (ref 0–0.5)
INFLUENZA A, MOLECULAR: NEGATIVE
INFLUENZA B, MOLECULAR: NEGATIVE
INR PPP: 1.2 (ref 0.8–1.2)
KETONES UR QL STRIP: ABNORMAL
LACTATE SERPL-SCNC: 1.4 MMOL/L (ref 0.5–2.2)
LDH SERPL L TO P-CCNC: 163 U/L (ref 110–260)
LEUKOCYTE ESTERASE UR QL STRIP: ABNORMAL
LYMPHOCYTES # BLD AUTO: 1.2 K/UL (ref 1–4.8)
LYMPHOCYTES NFR BLD: 15.8 % (ref 18–48)
MAGNESIUM SERPL-MCNC: 0.9 MG/DL (ref 1.6–2.6)
MCH RBC QN AUTO: 31.6 PG (ref 27–31)
MCHC RBC AUTO-ENTMCNC: 32.9 G/DL (ref 32–36)
MCV RBC AUTO: 96 FL (ref 82–98)
MICROSCOPIC COMMENT: ABNORMAL
MONOCYTES # BLD AUTO: 0.7 K/UL (ref 0.3–1)
MONOCYTES NFR BLD: 9.1 % (ref 4–15)
NEUTROPHILS # BLD AUTO: 5.5 K/UL (ref 1.8–7.7)
NEUTROPHILS NFR BLD: 74.6 % (ref 38–73)
NITRITE UR QL STRIP: NEGATIVE
NRBC BLD-RTO: 0 /100 WBC
PH UR STRIP: 6 [PH] (ref 5–8)
PHOSPHATE SERPL-MCNC: 3.4 MG/DL (ref 2.7–4.5)
PLATELET # BLD AUTO: 155 K/UL (ref 150–350)
PMV BLD AUTO: 9.3 FL (ref 9.2–12.9)
POTASSIUM SERPL-SCNC: 4.3 MMOL/L (ref 3.5–5.1)
PROCALCITONIN SERPL IA-MCNC: 4.92 NG/ML
PROT SERPL-MCNC: 5.7 G/DL (ref 6–8.4)
PROT UR QL STRIP: NEGATIVE
PROTHROMBIN TIME: 13 SEC (ref 9–12.5)
RBC # BLD AUTO: 3.74 M/UL (ref 4–5.4)
SARS-COV-2 RDRP RESP QL NAA+PROBE: NEGATIVE
SODIUM SERPL-SCNC: 131 MMOL/L (ref 136–145)
SP GR UR STRIP: 1.02 (ref 1–1.03)
SPECIMEN SOURCE: NORMAL
SQUAMOUS #/AREA URNS HPF: 30 /HPF
TROPONIN I SERPL DL<=0.01 NG/ML-MCNC: <0.006 NG/ML (ref 0–0.03)
TSH SERPL DL<=0.005 MIU/L-ACNC: 1.96 UIU/ML (ref 0.4–4)
URN SPEC COLLECT METH UR: ABNORMAL
UROBILINOGEN UR STRIP-ACNC: ABNORMAL EU/DL
WBC # BLD AUTO: 7.33 K/UL (ref 3.9–12.7)
WBC #/AREA URNS HPF: 12 /HPF (ref 0–5)

## 2020-12-05 PROCEDURE — 96368 THER/DIAG CONCURRENT INF: CPT

## 2020-12-05 PROCEDURE — 85730 THROMBOPLASTIN TIME PARTIAL: CPT

## 2020-12-05 PROCEDURE — 85025 COMPLETE CBC W/AUTO DIFF WBC: CPT

## 2020-12-05 PROCEDURE — 82728 ASSAY OF FERRITIN: CPT

## 2020-12-05 PROCEDURE — 81000 URINALYSIS NONAUTO W/SCOPE: CPT

## 2020-12-05 PROCEDURE — 83605 ASSAY OF LACTIC ACID: CPT

## 2020-12-05 PROCEDURE — 83735 ASSAY OF MAGNESIUM: CPT

## 2020-12-05 PROCEDURE — 80053 COMPREHEN METABOLIC PANEL: CPT

## 2020-12-05 PROCEDURE — 87651 STREP A DNA AMP PROBE: CPT

## 2020-12-05 PROCEDURE — 82553 CREATINE MB FRACTION: CPT

## 2020-12-05 PROCEDURE — 63600175 PHARM REV CODE 636 W HCPCS: Performed by: SURGERY

## 2020-12-05 PROCEDURE — 84145 PROCALCITONIN (PCT): CPT

## 2020-12-05 PROCEDURE — 99285 EMERGENCY DEPT VISIT HI MDM: CPT | Mod: 25

## 2020-12-05 PROCEDURE — 87502 INFLUENZA DNA AMP PROBE: CPT

## 2020-12-05 PROCEDURE — 96365 THER/PROPH/DIAG IV INF INIT: CPT

## 2020-12-05 PROCEDURE — 83615 LACTATE (LD) (LDH) ENZYME: CPT

## 2020-12-05 PROCEDURE — 93010 EKG 12-LEAD: ICD-10-PCS | Mod: ,,, | Performed by: INTERNAL MEDICINE

## 2020-12-05 PROCEDURE — 83880 ASSAY OF NATRIURETIC PEPTIDE: CPT

## 2020-12-05 PROCEDURE — 86140 C-REACTIVE PROTEIN: CPT

## 2020-12-05 PROCEDURE — 87040 BLOOD CULTURE FOR BACTERIA: CPT | Mod: 59

## 2020-12-05 PROCEDURE — 84443 ASSAY THYROID STIM HORMONE: CPT

## 2020-12-05 PROCEDURE — 87086 URINE CULTURE/COLONY COUNT: CPT

## 2020-12-05 PROCEDURE — 87186 SC STD MICRODIL/AGAR DIL: CPT

## 2020-12-05 PROCEDURE — 25000003 PHARM REV CODE 250: Performed by: SURGERY

## 2020-12-05 PROCEDURE — 96366 THER/PROPH/DIAG IV INF ADDON: CPT

## 2020-12-05 PROCEDURE — 85610 PROTHROMBIN TIME: CPT

## 2020-12-05 PROCEDURE — 93005 ELECTROCARDIOGRAM TRACING: CPT

## 2020-12-05 PROCEDURE — 85651 RBC SED RATE NONAUTOMATED: CPT

## 2020-12-05 PROCEDURE — 84484 ASSAY OF TROPONIN QUANT: CPT

## 2020-12-05 PROCEDURE — 96361 HYDRATE IV INFUSION ADD-ON: CPT

## 2020-12-05 PROCEDURE — U0002 COVID-19 LAB TEST NON-CDC: HCPCS

## 2020-12-05 PROCEDURE — 87077 CULTURE AEROBIC IDENTIFY: CPT

## 2020-12-05 PROCEDURE — 93010 ELECTROCARDIOGRAM REPORT: CPT | Mod: ,,, | Performed by: INTERNAL MEDICINE

## 2020-12-05 PROCEDURE — 82550 ASSAY OF CK (CPK): CPT

## 2020-12-05 PROCEDURE — 85379 FIBRIN DEGRADATION QUANT: CPT

## 2020-12-05 PROCEDURE — 84100 ASSAY OF PHOSPHORUS: CPT

## 2020-12-05 RX ORDER — MAGNESIUM SULFATE HEPTAHYDRATE 40 MG/ML
2 INJECTION, SOLUTION INTRAVENOUS
Status: COMPLETED | OUTPATIENT
Start: 2020-12-05 | End: 2020-12-05

## 2020-12-05 RX ORDER — SULFAMETHOXAZOLE AND TRIMETHOPRIM 800; 160 MG/1; MG/1
1 TABLET ORAL 2 TIMES DAILY
Qty: 14 TABLET | Refills: 0 | Status: SHIPPED | OUTPATIENT
Start: 2020-12-05 | End: 2020-12-12

## 2020-12-05 RX ADMIN — CEFTRIAXONE 2 G: 2 INJECTION, SOLUTION INTRAVENOUS at 12:12

## 2020-12-05 RX ADMIN — SODIUM CHLORIDE 1000 ML: 0.9 INJECTION, SOLUTION INTRAVENOUS at 11:12

## 2020-12-05 RX ADMIN — MAGNESIUM SULFATE HEPTAHYDRATE 2 G: 40 INJECTION, SOLUTION INTRAVENOUS at 12:12

## 2020-12-05 NOTE — ED PROVIDER NOTES
"[facility]                                                 The ER triage nurse note was reviewed prior to me examining the patient    Chief Complaint  89 y.o. female with Hypotension (family states BP was 71/43 this morning)      History of Present Illness  Erin Sahu presents to the emergency room with low blood pressure  Patient had low blood pressure at home with recurrent sepsis issue HX  Patient was diagnosed with sepsis in September 2020, same presentation  Granddaughter who cares for the patient states she noted low BP at home  Patient on arrival is afebrile with mildly hypotensive, has no complaints now  Immediately states that she wishes not to be hospitalized "no matter what    The history is provided by the patient   device was not used during this ER visit    Past Medical History   -- Anxiety     -- Arthritis     -- Coronary artery disease     -- Diabetes mellitus type II     -- Hyperlipidemia     -- Hypertension        Past Surgical History   -- BREAST SURGERY       -- CARDIAC PACEMAKER PLACEMENT       -- CHOLECYSTECTOMY       -- CORONARY ANGIOPLASTY WITH STENT PLACEMENT       -- EYE SURGERY       -- HYSTERECTOMY       -- TOTAL KNEE ARTHROPLASTY          Allergic to IV dye    I have reviewed all of this patient's past medical, surgical, family, and social   histories as well as active allergies and medications documented in the  electronic medical record    Review of Systems and Physical Exam      Review of Systems  -- Constitution - fatigue, weakness, no chills or fever  -- Eyes - no tearing or redness, no visual disturbance  -- Ear, Nose - no tinnitus or earache, no nasal congestion or discharge  -- Mouth,Throat - no sore throat, no toothache, normal voice, normal swallowing  -- Respiratory - denies cough and congestion, no shortness of breath, no CAMPOS  -- Cardiovascular - denies chest pain, no palpitations, denies claudication  -- Gastrointestinal - denies " abdominal pain, nausea, vomiting, or diarrhea  -- Genitourinary - no dysuria, denies flank pain, no hematuria, no STD risk  -- Musculoskeletal - denies back pain, negative for trauma or injury  -- Neurological - no headache, denies weakness or seizure; no LOC  -- Skin - denies pallor, rash, or changes in skin. no hives or welts noted    Vital Signs  Her temperature is 96.5 °F (35.8 °C).   Her blood pressure is 113/62 and her pulse is 65.   Her respiration is 19 and oxygen saturation is 100%.     Physical Exam  -- Nursing note and vitals reviewed  -- Constitutional: Appears well-developed and well-nourished  -- Head: Atraumatic. Normocephalic. No obvious abnormality  -- Eyes: Pupils are equal and reactive to light. Normal conjunctiva and lids  -- Cardiac: Normal rate, regular rhythm and normal heart sounds  -- Pulmonary: Normal respiratory effort, breath sounds clear to auscultation  -- Abdominal: Soft, no tenderness. Normal bowel sounds. Normal liver edge  -- Musculoskeletal: Normal range of motion, no effusions. Joints stable   -- Neurological: No focal deficits. Showed good interaction with staff  -- Vascular: Posterior tibial, dorsalis pedis and radial pulses 2+ bilaterally      Emergency Room Course      Urinalysis  Glucose, UA Trace (*)   Ketones, UA Trace (*)   Bilirubin (UA) 2+ (*)   Urobilinogen, UA 4.0-6.0 (*)   Leukocytes, UA 1+ (*)   WBC, UA 12 (*)   Bacteria Moderate (*)   Amorphous, UA Many (*)     Lab Results   (L)   K 4.3   CL 94 (L)   CO2 24   BUN 24   CREATININE 1.1    (H)   ALKPHOS 291 (H)   AST 30   ALT 15   BILITOT 2.0 (H)   ALBUMIN 2.1 (L)   PROT 5.7 (L)   WBC 7.33   HGB 11.8 (L)   HCT 35.9 (L)      CPK 18 (L)   CPK 18 (L)   CPKMB 0.5   TROPONINI <0.006   INR 1.2    (H)   DDIMER 0.49   LACTATE 1.4   MG 0.9 (LL)   TSH 1.959     EKG  -- The EKG findings today were without concerning findings from baseline     Radiology  -- Chest x-ray showed no infiltrate and showed no  acute pathology    Additional Work up  -- procalcitonin greater than 4  -- Blood cultures have also been drawn, results are pending  -- rapid Coronavirus PCR was negative    Medications Given  cefTRIAXone (ROCEPHIN) 2 g/50 mL D5W IVPB (2 g Intravenous New Bag 12/5/20 1219)   sodium chloride 0.9% bolus 1,000 mL (0 mLs Intravenous Stopped 12/5/20 1215)   sodium chloride 0.9% bolus 1,000 mL (0 mLs Intravenous Stopped 12/5/20 1215)   magnesium sulfate 2g in water 50mL IVPB (premix) (2 g Intravenous New Bag 12/5/20 1220)     ED Physician Management  -- Diagnosis management comments: 89 y.o. female with reported hypotension  -- initial hypotension in the ER improved with IV fluids given this morning here  -- patient however has recurrent urinary tract infection, elevated procalcitonin  -- I had a long discussion with the patient with her granddaughter at bedside  -- the patient is a lactic to go on hospice, no longer wants to be hospitalized  -- her granddaughter, who is a power-of-, completely agrees with this  -- I did give the patient IV Rocephin here with a prescription of Bactrim on DC  -- I have also made a case management consult for hospice placement now  -- patient is declining hospitalization and elected to go on hospice on discharge    Diagnosis  [R53.83] Fatigue  [Z51.5] Hospice care patient   [N30.00] Acute cystitis without hematuria    Disposition and Plan  -- Disposition: home  -- Condition: Guarded  -- Follow-up: Patient to follow up with Junior Godinez MD in 1-2 days.  -- I advised the patient that we have found no life threatening condition today  -- At this time, I believe the patient is clinically stable for discharge.   -- The patient acknowledges that close follow up with a MD is required   -- Patient agrees to comply with all instruction and direction given in the ER    This note is dictated on M*Modal word recognition program.  There are word recognition mistakes that are occasionally  missed on review.         Moy Branham MD  12/05/20 2302

## 2020-12-05 NOTE — ED TRIAGE NOTES
89 y.o. female presents to ER ED 01/ED 01A   Chief Complaint   Patient presents with    Hypotension     family states BP was 71/43 this morning   . No acute distress noted.

## 2020-12-07 LAB — BACTERIA UR CULT: NORMAL

## 2020-12-08 LAB
BACTERIA BLD CULT: ABNORMAL

## 2020-12-14 ENCOUNTER — TELEPHONE (OUTPATIENT)
Dept: INTERNAL MEDICINE | Facility: CLINIC | Age: 85
End: 2020-12-14

## 2020-12-14 NOTE — TELEPHONE ENCOUNTER
Patient's family is requesting that you follow patient while on care with [facility]. Please advise. Thanks.

## 2020-12-14 NOTE — TELEPHONE ENCOUNTER
----- Message from Lidia Melvi sent at 2020 12:28 PM CST -----  Regarding: Hospice Care  Contact: Paula with [facility]  Erin Sahu  MRN: [id number]  : 1929  PCP: Junior Godinez  Home Phone      [phone]  Work Phone      Not on file.  Mobile          Not on file.      MESSAGE:   Request to speak to a nurse regarding patient, and family request to have Dr. Godinez care for patient while under Hospice care.     Phone # [phone]    Pharmacy - [facility] [address]

## 2020-12-15 NOTE — TELEPHONE ENCOUNTER
Spoke to Paula. She states that they will be starting the standard emergency kit for medications and not making any other changes for now.

## 2021-01-04 NOTE — PLAN OF CARE
03/08/17 1103   Discharge Assessment   Assessment Type Discharge Planning Assessment   Confirmed/corrected address and phone number on facesheet? Yes   Assessment information obtained from? Patient   Expected Length of Stay (days) 2   Communicated expected length of stay with patient/caregiver yes   Type of Healthcare Directive Received Living will   If Healthcare Directive is received, is it scanned into Epic? yes   Prior to hospitilization cognitive status: Alert/Oriented   Prior to hospitalization functional status: Independent   Current cognitive status: Alert/Oriented   Current Functional Status: Independent   Arrived From home or self-care   Lives With spouse   Able to Return to Prior Arrangements yes   Is patient able to care for self after discharge? Yes   How many people do you have in your home that can help with your care after discharge? 1   Who are your caregiver(s) and their phone number(s)? Granddaughter, Beni Sahu [phone]   Patient's perception of discharge disposition home or selfcare   Readmission Within The Last 30 Days no previous admission in last 30 days   Patient currently being followed by outpatient case management? No   Patient currently receives home health services? No   Does the patient currently use HME? Yes   Name and contact number for HME provider: Unknown   Patient currently receives private duty nursing? No   Patient currently receives any other outside agency services? No   Equipment Currently Used at Home walker, rolling   Do you have any problems affording any of your prescribed medications? No   Is the patient taking medications as prescribed? yes   Do you have any financial concerns preventing you from receiving the healthcare you need? No   Does the patient have transportation to healthcare appointments? Yes   Transportation Available family or friend will provide   On Dialysis? No   Does the patient receive services at the Coumadin Clinic? No   Are there any open  COVID-19 Week 1 Survey      Responses   [facility] patient discharged from?  [place]   Does the patient have one of the following disease processes/diagnoses(primary or secondary)?  COVID-19   COVID-19 underlying condition?  None   Week 1 Call successful?  No   Revoke  Readmitted   Discharge diagnosis  Covid 19 PNA          Ginger Wagner RN   cases? No   Discharge Plan A Home with family;Home Health   Discharge Plan B Home with family;Home Health   Patient/Family In Agreement With Plan yes   Patient is an 87 year old female coming to the hospital from home where she lives with her spouse, Celestine.  She reports that her spouse is not able to help her much.  Her granddaughter, Beni is available to assist with her care.  Patient will also be discharged to home with [facility].  She was informed of observation status and given a signed copy of the MOON.  Patient was also given the discharge planning check list to review.  No questions or concerns for .  No other anticipated discharge needs.  Patient also reports they have a sitter that comes to care for both of them three days a week.

## 2021-01-22 ENCOUNTER — DOCUMENT SCAN (OUTPATIENT)
Dept: HOME HEALTH SERVICES | Facility: HOSPITAL | Age: 86
End: 2021-01-22
Payer: MEDICARE

## 2024-04-04 NOTE — PT/OT/SLP PROGRESS
Occupational Therapy   Treatment    Name: rEin Sahu  MRN: [id number]  Admitting Diagnosis:  Sepsis due to Pseudomonas species       Recommendations:     Discharge Recommendations: nursing facility, skilled  Discharge Equipment Recommendations:  (TBA at next LOC)  Barriers to discharge:  Decreased caregiver support    Assessment:     Erin Sahu is a 89 y.o. female with a medical diagnosis of Sepsis due to Pseudomonas species.  She presents with improved cognition, granddaughter arriving during session. Performance deficits affecting function are weakness, impaired endurance, impaired self care skills, impaired functional mobilty, gait instability, impaired balance, impaired cognition, decreased coordination, decreased upper extremity function, decreased lower extremity function, decreased safety awareness, impaired cardiopulmonary response to activity.      Pt making steady progress towards goals, POC remains appropriate.  Pt with improved indep during all tasks, however still requiring assist for all ADLS & mobility. Pt would benefit from continued OT services with SNF to maximize indep during all daily tasks.     Rehab Prognosis:  Good; patient would benefit from acute skilled OT services to address these deficits and reach maximum level of function.       Plan:     Patient to be seen 5 x/week to address the above listed problems via self-care/home management, therapeutic activities, therapeutic exercises  · Plan of Care Expires: 10/17/20  · Plan of Care Reviewed with: patient, grandchild(amando)    Subjective     Pain/Comfort:  · Pain Rating 1: 0/10    Objective:     Communicated with: OLAMIDE Tracey prior to session.  Patient found supine with peripheral IV, SCD, bed alarm, oxygen upon OT entry to room.    General Precautions: Standard, diabetic, fall, pacemaker, respiratory   Orthopedic Precautions:N/A   Braces:       Occupational Performance:     Bed Mobility:    · Patient completed Rolling/Turning to Right with  moderate assistance  · Patient completed Supine to Sit with moderate assistance     Functional Mobility/Transfers:  · Patient completed Sit <> Stand Transfer with contact guard assistance  with  rolling walker   · Patient completed Bed <> Chair Transfer using Step Transfer technique with minimum assistance with rolling walker and assist for RW mgmt  · Functional Mobility: amb to chair with Min A using RW, assist for RW mgmt and frequent verbal cues  ·     Activities of Daily Living:  · Grooming: setup wash face & hands  seated in chair   · Upper Body Dressing: minimum assistance change gown seated EOB  · Lower Body Dressing: total assistance don/doff socks EOB  · Toileting: total assistance ayush care following bowel incontinence      [facility] 6 Click ADL: 15    Treatment & Education:  Pt participated in OT tx session with ADLS & functional mobility performed as documented above.     Education provided on role of OT including safe performance of self-care tasks, mobility techniques, safe use of DME, and encouragement of mobilization during hospitalization to prevent/limit deconditioning as well as discharge recommendations     Patient left up in chair with all lines intact, call button in reach, RN notified and granddaughter presentEducation:      GOALS:   Multidisciplinary Problems     Occupational Therapy Goals        Problem: Occupational Therapy Goal    Goal Priority Disciplines Outcome Interventions   Occupational Therapy Goal     OT, PT/OT Ongoing, Progressing    Description: Goals to be met by: 9/26/20    Patient will increase functional independence with ADLs by performing:    UB dressing at SBA.  LB dressing at SBA.  Grooming standing at the sink at SBA.  Toileting at SBA.  Toilet/BSC transfers at SBA.  Sponge bathing at SBA.                     Time Tracking:     OT Date of Treatment: 09/18/20  OT Start Time: 0919  OT Stop Time: 0942  OT Total Time (min): 23 min    Billable Minutes:Self Care/Home Management  15  Therapeutic Activity 8   Overlap with PT for portions of session due to complex nature of patient and for safety with mobility and performance of self-care tasks to decrease fall risk as well as patient and caregiver injury as pt requires two skilled therapists to provide interventions.       Italia Mccarthy, OT  9/18/2020     Statement Selected
